# Patient Record
Sex: FEMALE | Race: BLACK OR AFRICAN AMERICAN | Employment: OTHER | URBAN - METROPOLITAN AREA
[De-identification: names, ages, dates, MRNs, and addresses within clinical notes are randomized per-mention and may not be internally consistent; named-entity substitution may affect disease eponyms.]

---

## 2017-01-01 ENCOUNTER — CARE COORDINATION (OUTPATIENT)
Dept: CASE MANAGEMENT | Age: 77
End: 2017-01-01

## 2017-01-01 ENCOUNTER — HOSPITAL ENCOUNTER (OUTPATIENT)
Facility: MEDICAL CENTER | Age: 77
End: 2017-01-01
Payer: MEDICARE

## 2017-01-01 ENCOUNTER — TELEPHONE (OUTPATIENT)
Dept: FAMILY MEDICINE CLINIC | Age: 77
End: 2017-01-01

## 2017-01-01 ENCOUNTER — HOSPITAL ENCOUNTER (OUTPATIENT)
Dept: INFUSION THERAPY | Facility: MEDICAL CENTER | Age: 77
Discharge: HOME | End: 2017-10-20

## 2017-01-01 ENCOUNTER — CARE COORDINATION (OUTPATIENT)
Dept: CARE COORDINATION | Age: 77
End: 2017-01-01

## 2017-01-01 ENCOUNTER — TELEPHONE (OUTPATIENT)
Dept: PHARMACY | Age: 77
End: 2017-01-01

## 2017-01-01 ENCOUNTER — TELEPHONE (OUTPATIENT)
Dept: FAMILY MEDICINE CLINIC | Facility: CLINIC | Age: 77
End: 2017-01-01

## 2017-01-01 ENCOUNTER — HOSPITAL ENCOUNTER (OUTPATIENT)
Dept: INTERVENTIONAL RADIOLOGY/VASCULAR | Age: 77
Discharge: HOME | End: 2017-12-08
Payer: COMMERCIAL

## 2017-01-01 ENCOUNTER — APPOINTMENT (OUTPATIENT)
Dept: ULTRASOUND IMAGING | Age: 77
DRG: 641 | End: 2017-01-01
Attending: FAMILY MEDICINE
Payer: MEDICARE

## 2017-01-01 ENCOUNTER — TELEPHONE (OUTPATIENT)
Dept: INFUSION THERAPY | Facility: MEDICAL CENTER | Age: 77
End: 2017-01-01

## 2017-01-01 ENCOUNTER — HOSPITAL ENCOUNTER (OUTPATIENT)
Age: 77
Setting detail: SPECIMEN
Discharge: HOME | End: 2017-05-04
Payer: MEDICARE

## 2017-01-01 ENCOUNTER — APPOINTMENT (OUTPATIENT)
Dept: ULTRASOUND IMAGING | Age: 77
DRG: 871 | End: 2017-01-01
Payer: COMMERCIAL

## 2017-01-01 ENCOUNTER — OFFICE VISIT (OUTPATIENT)
Dept: FAMILY MEDICINE CLINIC | Age: 77
End: 2017-01-01
Payer: MEDICARE

## 2017-01-01 ENCOUNTER — OFFICE VISIT (OUTPATIENT)
Dept: NEPHROLOGY | Age: 77
End: 2017-01-01
Payer: COMMERCIAL

## 2017-01-01 ENCOUNTER — HOSPITAL ENCOUNTER (OUTPATIENT)
Age: 77
Setting detail: SPECIMEN
Discharge: HOME | End: 2017-03-20
Payer: MEDICARE

## 2017-01-01 ENCOUNTER — ANESTHESIA (OUTPATIENT)
Dept: ENDOSCOPY | Age: 77
DRG: 641 | End: 2017-01-01
Payer: MEDICARE

## 2017-01-01 ENCOUNTER — HOSPITAL ENCOUNTER (OUTPATIENT)
Age: 77
Setting detail: SPECIMEN
Discharge: HOME | End: 2017-10-11
Payer: COMMERCIAL

## 2017-01-01 ENCOUNTER — HOSPITAL ENCOUNTER (OUTPATIENT)
Facility: MEDICAL CENTER | Age: 77
End: 2017-01-01
Payer: COMMERCIAL

## 2017-01-01 ENCOUNTER — TELEPHONE (OUTPATIENT)
Dept: NEPHROLOGY | Age: 77
End: 2017-01-01

## 2017-01-01 ENCOUNTER — HOSPITAL ENCOUNTER (INPATIENT)
Age: 77
LOS: 9 days | Discharge: HOME HEALTH | DRG: 641 | End: 2017-07-29
Attending: FAMILY MEDICINE | Admitting: FAMILY MEDICINE
Payer: MEDICARE

## 2017-01-01 ENCOUNTER — CARE COORDINATOR VISIT (OUTPATIENT)
Dept: CARE COORDINATION | Age: 77
End: 2017-01-01

## 2017-01-01 ENCOUNTER — HOSPITAL ENCOUNTER (OUTPATIENT)
Age: 77
Setting detail: SPECIMEN
Discharge: HOME | End: 2017-05-03
Payer: MEDICARE

## 2017-01-01 ENCOUNTER — EPISODE CHANGES (OUTPATIENT)
Dept: CASE MANAGEMENT | Age: 77
End: 2017-01-01

## 2017-01-01 ENCOUNTER — OFFICE VISIT (OUTPATIENT)
Dept: FAMILY MEDICINE CLINIC | Age: 77
End: 2017-01-01
Payer: COMMERCIAL

## 2017-01-01 ENCOUNTER — HOSPITAL ENCOUNTER (INPATIENT)
Age: 77
LOS: 13 days | Discharge: NURSING, REHAB OR LONG-TERM CARE | DRG: 871 | End: 2018-01-06
Attending: EMERGENCY MEDICINE | Admitting: INTERNAL MEDICINE
Payer: COMMERCIAL

## 2017-01-01 ENCOUNTER — HOSPITAL ENCOUNTER (OUTPATIENT)
Dept: INFUSION THERAPY | Facility: MEDICAL CENTER | Age: 77
Discharge: HOME | End: 2017-09-22
Payer: COMMERCIAL

## 2017-01-01 ENCOUNTER — HOSPITAL ENCOUNTER (OUTPATIENT)
Age: 77
Setting detail: SPECIMEN
Discharge: HOME | End: 2017-03-13
Payer: MEDICARE

## 2017-01-01 ENCOUNTER — APPOINTMENT (OUTPATIENT)
Dept: CT IMAGING | Age: 77
DRG: 641 | End: 2017-01-01
Attending: FAMILY MEDICINE
Payer: MEDICARE

## 2017-01-01 ENCOUNTER — HOSPITAL ENCOUNTER (OUTPATIENT)
Age: 77
Setting detail: SPECIMEN
Discharge: HOME | End: 2017-05-16
Payer: MEDICARE

## 2017-01-01 ENCOUNTER — TELEPHONE (OUTPATIENT)
Dept: ADMINISTRATIVE | Age: 77
End: 2017-01-01

## 2017-01-01 ENCOUNTER — APPOINTMENT (OUTPATIENT)
Dept: GENERAL RADIOLOGY | Age: 77
DRG: 641 | End: 2017-01-01
Attending: FAMILY MEDICINE
Payer: MEDICARE

## 2017-01-01 ENCOUNTER — HOSPITAL ENCOUNTER (OUTPATIENT)
Age: 77
Setting detail: SPECIMEN
Discharge: HOME | End: 2017-05-11
Payer: MEDICARE

## 2017-01-01 ENCOUNTER — APPOINTMENT (OUTPATIENT)
Dept: GENERAL RADIOLOGY | Age: 77
DRG: 871 | End: 2017-01-01
Payer: COMMERCIAL

## 2017-01-01 ENCOUNTER — HOSPITAL ENCOUNTER (OUTPATIENT)
Age: 77
Setting detail: SPECIMEN
Discharge: HOME | End: 2017-03-27
Payer: MEDICARE

## 2017-01-01 ENCOUNTER — HOSPITAL ENCOUNTER (OUTPATIENT)
Age: 77
Setting detail: SPECIMEN
Discharge: HOME | End: 2017-05-31
Payer: MEDICARE

## 2017-01-01 ENCOUNTER — HOSPITAL ENCOUNTER (OUTPATIENT)
Dept: VASCULAR LAB | Age: 77
Discharge: HOME | End: 2017-06-20
Payer: MEDICARE

## 2017-01-01 ENCOUNTER — TELEPHONE (OUTPATIENT)
Dept: GASTROENTEROLOGY | Age: 77
End: 2017-01-01

## 2017-01-01 ENCOUNTER — HOSPITAL ENCOUNTER (OUTPATIENT)
Age: 77
Setting detail: SPECIMEN
Discharge: HOME | End: 2017-04-10
Payer: MEDICARE

## 2017-01-01 ENCOUNTER — HOSPITAL ENCOUNTER (OUTPATIENT)
Age: 77
Setting detail: SPECIMEN
Discharge: HOME | End: 2017-03-28
Payer: MEDICARE

## 2017-01-01 ENCOUNTER — OFFICE VISIT (OUTPATIENT)
Dept: ONCOLOGY | Age: 77
End: 2017-01-01
Payer: MEDICARE

## 2017-01-01 ENCOUNTER — HOSPITAL ENCOUNTER (OUTPATIENT)
Dept: INFUSION THERAPY | Facility: MEDICAL CENTER | Age: 77
Discharge: HOME | End: 2017-08-11
Payer: MEDICARE

## 2017-01-01 ENCOUNTER — HOSPITAL ENCOUNTER (OUTPATIENT)
Facility: MEDICAL CENTER | Age: 77
Discharge: HOME | End: 2017-08-10
Payer: MEDICARE

## 2017-01-01 ENCOUNTER — TELEPHONE (OUTPATIENT)
Dept: ONCOLOGY | Age: 77
End: 2017-01-01

## 2017-01-01 ENCOUNTER — HOSPITAL ENCOUNTER (OUTPATIENT)
Age: 77
Setting detail: SPECIMEN
Discharge: HOME | End: 2017-05-27
Payer: MEDICARE

## 2017-01-01 ENCOUNTER — CARE COORDINATOR VISIT (OUTPATIENT)
Dept: CASE MANAGEMENT | Age: 77
End: 2017-01-01

## 2017-01-01 ENCOUNTER — HOSPITAL ENCOUNTER (OUTPATIENT)
Age: 77
Setting detail: SPECIMEN
Discharge: HOME | End: 2017-07-20
Payer: MEDICARE

## 2017-01-01 ENCOUNTER — HOSPITAL ENCOUNTER (INPATIENT)
Age: 77
LOS: 4 days | Discharge: NURSING, REHAB OR LONG-TERM CARE | DRG: 683 | End: 2017-12-20
Attending: FAMILY MEDICINE | Admitting: INTERNAL MEDICINE
Payer: COMMERCIAL

## 2017-01-01 ENCOUNTER — HOSPITAL ENCOUNTER (OUTPATIENT)
Age: 77
Setting detail: SPECIMEN
Discharge: HOME | End: 2017-05-01
Payer: MEDICARE

## 2017-01-01 ENCOUNTER — ANESTHESIA EVENT (OUTPATIENT)
Dept: ENDOSCOPY | Age: 77
DRG: 641 | End: 2017-01-01
Payer: MEDICARE

## 2017-01-01 ENCOUNTER — HOSPITAL ENCOUNTER (OUTPATIENT)
Dept: INFUSION THERAPY | Facility: MEDICAL CENTER | Age: 77
Discharge: HOME | End: 2017-10-27

## 2017-01-01 ENCOUNTER — HOSPITAL ENCOUNTER (INPATIENT)
Age: 77
LOS: 1 days | Discharge: NURSING, REHAB OR LONG-TERM CARE | DRG: 812 | End: 2017-03-14
Attending: EMERGENCY MEDICINE | Admitting: INTERNAL MEDICINE
Payer: MEDICARE

## 2017-01-01 ENCOUNTER — HOSPITAL ENCOUNTER (OUTPATIENT)
Age: 77
Setting detail: SPECIMEN
Discharge: HOME | End: 2017-04-05
Payer: MEDICARE

## 2017-01-01 ENCOUNTER — TELEPHONE (OUTPATIENT)
Dept: PHARMACY | Facility: CLINIC | Age: 77
End: 2017-01-01

## 2017-01-01 ENCOUNTER — TELEPHONE (OUTPATIENT)
Dept: CASE MANAGEMENT | Age: 77
End: 2017-01-01

## 2017-01-01 VITALS
HEIGHT: 64 IN | DIASTOLIC BLOOD PRESSURE: 84 MMHG | SYSTOLIC BLOOD PRESSURE: 118 MMHG | WEIGHT: 141 LBS | TEMPERATURE: 97.9 F | HEART RATE: 93 BPM | BODY MASS INDEX: 24.07 KG/M2

## 2017-01-01 VITALS
DIASTOLIC BLOOD PRESSURE: 85 MMHG | BODY MASS INDEX: 25.76 KG/M2 | HEART RATE: 89 BPM | RESPIRATION RATE: 20 BRPM | SYSTOLIC BLOOD PRESSURE: 132 MMHG | WEIGHT: 150.9 LBS | TEMPERATURE: 97.8 F

## 2017-01-01 VITALS
RESPIRATION RATE: 16 BRPM | HEIGHT: 67 IN | TEMPERATURE: 97.9 F | OXYGEN SATURATION: 100 % | WEIGHT: 126.2 LBS | HEART RATE: 119 BPM | DIASTOLIC BLOOD PRESSURE: 56 MMHG | SYSTOLIC BLOOD PRESSURE: 97 MMHG | BODY MASS INDEX: 19.81 KG/M2

## 2017-01-01 VITALS
BODY MASS INDEX: 29.91 KG/M2 | HEART RATE: 70 BPM | HEIGHT: 64 IN | TEMPERATURE: 97.8 F | WEIGHT: 175.2 LBS | DIASTOLIC BLOOD PRESSURE: 76 MMHG | SYSTOLIC BLOOD PRESSURE: 114 MMHG

## 2017-01-01 VITALS — DIASTOLIC BLOOD PRESSURE: 74 MMHG | OXYGEN SATURATION: 97 % | SYSTOLIC BLOOD PRESSURE: 140 MMHG

## 2017-01-01 VITALS — SYSTOLIC BLOOD PRESSURE: 128 MMHG | DIASTOLIC BLOOD PRESSURE: 98 MMHG | HEART RATE: 96 BPM | OXYGEN SATURATION: 99 %

## 2017-01-01 VITALS
WEIGHT: 150.2 LBS | SYSTOLIC BLOOD PRESSURE: 136 MMHG | HEIGHT: 64 IN | DIASTOLIC BLOOD PRESSURE: 80 MMHG | TEMPERATURE: 98.3 F | BODY MASS INDEX: 25.64 KG/M2 | HEART RATE: 91 BPM

## 2017-01-01 VITALS
RESPIRATION RATE: 16 BRPM | SYSTOLIC BLOOD PRESSURE: 117 MMHG | OXYGEN SATURATION: 100 % | HEIGHT: 67 IN | TEMPERATURE: 98.5 F | WEIGHT: 189.8 LBS | DIASTOLIC BLOOD PRESSURE: 59 MMHG | BODY MASS INDEX: 29.79 KG/M2 | HEART RATE: 109 BPM

## 2017-01-01 VITALS
SYSTOLIC BLOOD PRESSURE: 110 MMHG | HEIGHT: 64 IN | HEART RATE: 109 BPM | BODY MASS INDEX: 29.88 KG/M2 | TEMPERATURE: 97.5 F | DIASTOLIC BLOOD PRESSURE: 72 MMHG | WEIGHT: 175 LBS

## 2017-01-01 VITALS
WEIGHT: 168.6 LBS | OXYGEN SATURATION: 100 % | TEMPERATURE: 98.6 F | HEIGHT: 64 IN | DIASTOLIC BLOOD PRESSURE: 74 MMHG | SYSTOLIC BLOOD PRESSURE: 137 MMHG | BODY MASS INDEX: 28.79 KG/M2 | HEART RATE: 84 BPM | RESPIRATION RATE: 16 BRPM

## 2017-01-01 VITALS
TEMPERATURE: 97.3 F | DIASTOLIC BLOOD PRESSURE: 76 MMHG | WEIGHT: 125.6 LBS | SYSTOLIC BLOOD PRESSURE: 116 MMHG | BODY MASS INDEX: 21.44 KG/M2 | HEIGHT: 64 IN | HEART RATE: 100 BPM

## 2017-01-01 VITALS
HEART RATE: 92 BPM | OXYGEN SATURATION: 100 % | DIASTOLIC BLOOD PRESSURE: 78 MMHG | TEMPERATURE: 97.7 F | RESPIRATION RATE: 18 BRPM | SYSTOLIC BLOOD PRESSURE: 114 MMHG

## 2017-01-01 DIAGNOSIS — N17.9 ACUTE ON CHRONIC KIDNEY FAILURE (HCC): ICD-10-CM

## 2017-01-01 DIAGNOSIS — R22.43 LOCALIZED SWELLING OF BOTH LOWER LEGS: ICD-10-CM

## 2017-01-01 DIAGNOSIS — E87.20 METABOLIC ACIDOSIS: ICD-10-CM

## 2017-01-01 DIAGNOSIS — Z90.49 HISTORY OF PARTIAL COLECTOMY: ICD-10-CM

## 2017-01-01 DIAGNOSIS — E87.6 HYPOKALEMIA: Primary | ICD-10-CM

## 2017-01-01 DIAGNOSIS — N28.9 ACUTE ON CHRONIC RENAL INSUFFICIENCY: ICD-10-CM

## 2017-01-01 DIAGNOSIS — D63.1 ANEMIA IN CHRONIC RENAL DISEASE: ICD-10-CM

## 2017-01-01 DIAGNOSIS — M54.9 CHRONIC MIDLINE BACK PAIN, UNSPECIFIED BACK LOCATION: ICD-10-CM

## 2017-01-01 DIAGNOSIS — E87.20 METABOLIC ACIDOSIS: Primary | ICD-10-CM

## 2017-01-01 DIAGNOSIS — N18.9 CHRONIC KIDNEY DISEASE, UNSPECIFIED CKD STAGE: ICD-10-CM

## 2017-01-01 DIAGNOSIS — E55.9 VITAMIN D DEFICIENCY: ICD-10-CM

## 2017-01-01 DIAGNOSIS — N18.30 TYPE 2 DIABETES MELLITUS WITH STAGE 3 CHRONIC KIDNEY DISEASE, WITHOUT LONG-TERM CURRENT USE OF INSULIN (HCC): Primary | ICD-10-CM

## 2017-01-01 DIAGNOSIS — E83.42 HYPOMAGNESEMIA: ICD-10-CM

## 2017-01-01 DIAGNOSIS — D63.8 ANEMIA OF CHRONIC DISEASE: ICD-10-CM

## 2017-01-01 DIAGNOSIS — D50.0 IRON DEFICIENCY ANEMIA DUE TO CHRONIC BLOOD LOSS: ICD-10-CM

## 2017-01-01 DIAGNOSIS — N18.9 ACUTE ON CHRONIC RENAL INSUFFICIENCY: ICD-10-CM

## 2017-01-01 DIAGNOSIS — R77.8 ELEVATED TROPONIN: ICD-10-CM

## 2017-01-01 DIAGNOSIS — N18.4 CHRONIC KIDNEY DISEASE (CKD), STAGE IV (SEVERE) (HCC): ICD-10-CM

## 2017-01-01 DIAGNOSIS — Z91.81 AT HIGH RISK FOR FALLS: ICD-10-CM

## 2017-01-01 DIAGNOSIS — N18.4 CHRONIC KIDNEY DISEASE (CKD), STAGE IV (SEVERE) (HCC): Primary | ICD-10-CM

## 2017-01-01 DIAGNOSIS — N17.9 AKI (ACUTE KIDNEY INJURY) (HCC): ICD-10-CM

## 2017-01-01 DIAGNOSIS — R53.81 DEBILITATED PATIENT: Primary | ICD-10-CM

## 2017-01-01 DIAGNOSIS — E46 MALNUTRITION (HCC): ICD-10-CM

## 2017-01-01 DIAGNOSIS — N18.9 ANEMIA IN CHRONIC RENAL DISEASE: ICD-10-CM

## 2017-01-01 DIAGNOSIS — R63.4 WEIGHT LOSS: ICD-10-CM

## 2017-01-01 DIAGNOSIS — E11.22 TYPE 2 DIABETES MELLITUS WITH STAGE 3 CHRONIC KIDNEY DISEASE, WITHOUT LONG-TERM CURRENT USE OF INSULIN (HCC): ICD-10-CM

## 2017-01-01 DIAGNOSIS — E87.6 HYPOKALEMIA: ICD-10-CM

## 2017-01-01 DIAGNOSIS — G89.29 CHRONIC MIDLINE BACK PAIN, UNSPECIFIED BACK LOCATION: ICD-10-CM

## 2017-01-01 DIAGNOSIS — N39.0 URINARY TRACT INFECTION IN FEMALE: Primary | ICD-10-CM

## 2017-01-01 DIAGNOSIS — N18.30 CKD (CHRONIC KIDNEY DISEASE) STAGE 3, GFR 30-59 ML/MIN (HCC): Primary | ICD-10-CM

## 2017-01-01 DIAGNOSIS — I95.9 HYPOTENSION, UNSPECIFIED HYPOTENSION TYPE: ICD-10-CM

## 2017-01-01 DIAGNOSIS — R63.0 POOR APPETITE: ICD-10-CM

## 2017-01-01 DIAGNOSIS — Z87.898 HISTORY OF DIARRHEA: ICD-10-CM

## 2017-01-01 DIAGNOSIS — M54.50 CHRONIC MIDLINE LOW BACK PAIN WITHOUT SCIATICA: ICD-10-CM

## 2017-01-01 DIAGNOSIS — M25.551 RIGHT HIP PAIN: Primary | ICD-10-CM

## 2017-01-01 DIAGNOSIS — E16.2 HYPOGLYCEMIA: ICD-10-CM

## 2017-01-01 DIAGNOSIS — G89.29 CHRONIC MIDLINE LOW BACK PAIN WITHOUT SCIATICA: ICD-10-CM

## 2017-01-01 DIAGNOSIS — N17.9 ACUTE KIDNEY INJURY (HCC): Primary | ICD-10-CM

## 2017-01-01 DIAGNOSIS — E46 MALNUTRITION (HCC): Primary | ICD-10-CM

## 2017-01-01 DIAGNOSIS — E11.22 TYPE 2 DIABETES MELLITUS WITH STAGE 3 CHRONIC KIDNEY DISEASE, WITHOUT LONG-TERM CURRENT USE OF INSULIN (HCC): Primary | ICD-10-CM

## 2017-01-01 DIAGNOSIS — I10 ESSENTIAL HYPERTENSION: ICD-10-CM

## 2017-01-01 DIAGNOSIS — N18.30 TYPE 2 DIABETES MELLITUS WITH STAGE 3 CHRONIC KIDNEY DISEASE, WITHOUT LONG-TERM CURRENT USE OF INSULIN (HCC): ICD-10-CM

## 2017-01-01 DIAGNOSIS — N18.9 ACUTE ON CHRONIC KIDNEY FAILURE (HCC): ICD-10-CM

## 2017-01-01 DIAGNOSIS — R63.4 WEIGHT LOSS: Primary | ICD-10-CM

## 2017-01-01 DIAGNOSIS — E87.5 HYPERKALEMIA: ICD-10-CM

## 2017-01-01 DIAGNOSIS — A41.9 SEPSIS, DUE TO UNSPECIFIED ORGANISM: ICD-10-CM

## 2017-01-01 LAB
-: ABNORMAL
-: ABNORMAL
ABO/RH: NORMAL
ABO: NORMAL
ABSOLUTE EOS #: 0 K/UL (ref 0–0.4)
ABSOLUTE EOS #: 0.1 K/UL (ref 0–0.4)
ABSOLUTE EOS #: 0.1 K/UL (ref 0–0.4)
ABSOLUTE EOS #: 0.12 K/UL (ref 0–0.4)
ABSOLUTE EOS #: 0.4 K/UL (ref 0–0.4)
ABSOLUTE LYMPH #: 0.32 K/UL (ref 1–4.8)
ABSOLUTE LYMPH #: 1.1 K/UL (ref 1–4.8)
ABSOLUTE LYMPH #: 1.4 K/UL (ref 1–4.8)
ABSOLUTE LYMPH #: 1.63 K/UL (ref 1–4.8)
ABSOLUTE LYMPH #: 2.36 K/UL (ref 1–4.8)
ABSOLUTE MONO #: 0.25 K/UL (ref 0.1–0.8)
ABSOLUTE MONO #: 0.3 K/UL (ref 0.2–0.8)
ABSOLUTE MONO #: 0.38 K/UL (ref 0.1–0.8)
ABSOLUTE MONO #: 0.5 K/UL (ref 0.1–1.2)
ABSOLUTE MONO #: 0.53 K/UL (ref 0.1–0.8)
ABSOLUTE RETIC #: 0.01 M/UL (ref 0.02–0.1)
ABSOLUTE RETIC #: 0.06 M/UL (ref 0.02–0.1)
ACETAMINOPHEN LEVEL: <10 UG/ML (ref 10–30)
ADENOVIRUS F 40 41 PCR: NOT DETECTED
AFP-TUMOR MARKER: 1.8 UG/L
ALBUMIN (CALCULATED): 3.4 G/DL (ref 3.2–5.2)
ALBUMIN PERCENT: 63 % (ref 45–65)
ALBUMIN SERPL-MCNC: 1.9 G/DL (ref 3.5–5.1)
ALBUMIN SERPL-MCNC: 1.9 G/DL (ref 3.5–5.1)
ALBUMIN SERPL-MCNC: 2 G/DL (ref 3.5–5.1)
ALBUMIN SERPL-MCNC: 2.1 G/DL (ref 3.5–5.1)
ALBUMIN SERPL-MCNC: 2.3 G/DL (ref 3.5–5.1)
ALBUMIN SERPL-MCNC: 2.4 G/DL (ref 3.5–5.2)
ALBUMIN SERPL-MCNC: 2.5 G/DL (ref 3.5–5.1)
ALBUMIN SERPL-MCNC: 2.5 G/DL (ref 3.5–5.2)
ALBUMIN SERPL-MCNC: 3.2 G/DL (ref 3.5–5.2)
ALBUMIN SERPL-MCNC: 3.8 G/DL (ref 3.5–5.2)
ALBUMIN/GLOBULIN RATIO: 1.2 (ref 1–2.5)
ALBUMIN/GLOBULIN RATIO: ABNORMAL (ref 1–2.5)
ALLEN TEST: ABNORMAL
ALLEN TEST: ABNORMAL
ALLEN TEST: POSITIVE
ALP BLD-CCNC: 110 U/L (ref 35–104)
ALP BLD-CCNC: 127 U/L (ref 35–104)
ALP BLD-CCNC: 169 U/L (ref 38–126)
ALP BLD-CCNC: 170 U/L (ref 38–126)
ALP BLD-CCNC: 171 U/L (ref 38–126)
ALP BLD-CCNC: 186 U/L (ref 38–126)
ALP BLD-CCNC: 197 U/L (ref 38–126)
ALP BLD-CCNC: 219 U/L (ref 38–126)
ALPHA 1 PERCENT: 3 % (ref 3–6)
ALPHA 2 PERCENT: 11 % (ref 6–13)
ALPHA-1-GLOBULIN: 0.2 G/DL (ref 0.1–0.4)
ALPHA-2-GLOBULIN: 0.6 G/DL (ref 0.5–0.9)
ALT SERPL-CCNC: 14 U/L (ref 11–66)
ALT SERPL-CCNC: 16 U/L (ref 11–66)
ALT SERPL-CCNC: 20 U/L (ref 11–66)
ALT SERPL-CCNC: 29 U/L (ref 11–66)
ALT SERPL-CCNC: 8 U/L (ref 5–33)
ALT SERPL-CCNC: 9 U/L (ref 5–33)
AMMONIA: 25 UMOL/L (ref 11–60)
AMMONIA: 77 UMOL/L (ref 11–51)
AMORPHOUS: ABNORMAL
AMORPHOUS: ABNORMAL
AMYLASE: 108 U/L (ref 28–100)
ANION GAP SERPL CALCULATED.3IONS-SCNC: 10 MEQ/L (ref 8–16)
ANION GAP SERPL CALCULATED.3IONS-SCNC: 11 MEQ/L (ref 8–16)
ANION GAP SERPL CALCULATED.3IONS-SCNC: 12 MEQ/L (ref 8–16)
ANION GAP SERPL CALCULATED.3IONS-SCNC: 13 MEQ/L (ref 8–16)
ANION GAP SERPL CALCULATED.3IONS-SCNC: 13 MMOL/L (ref 9–17)
ANION GAP SERPL CALCULATED.3IONS-SCNC: 13 MMOL/L (ref 9–17)
ANION GAP SERPL CALCULATED.3IONS-SCNC: 14 MEQ/L (ref 8–16)
ANION GAP SERPL CALCULATED.3IONS-SCNC: 14 MMOL/L (ref 9–17)
ANION GAP SERPL CALCULATED.3IONS-SCNC: 15 MEQ/L (ref 8–16)
ANION GAP SERPL CALCULATED.3IONS-SCNC: 15 MEQ/L (ref 8–16)
ANION GAP SERPL CALCULATED.3IONS-SCNC: 15 MMOL/L (ref 9–17)
ANION GAP SERPL CALCULATED.3IONS-SCNC: 15 MMOL/L (ref 9–17)
ANION GAP SERPL CALCULATED.3IONS-SCNC: 16 MEQ/L (ref 8–16)
ANION GAP SERPL CALCULATED.3IONS-SCNC: 16 MEQ/L (ref 8–16)
ANION GAP SERPL CALCULATED.3IONS-SCNC: 16 MMOL/L (ref 9–17)
ANION GAP SERPL CALCULATED.3IONS-SCNC: 16 MMOL/L (ref 9–17)
ANION GAP SERPL CALCULATED.3IONS-SCNC: 17 MEQ/L (ref 8–16)
ANION GAP SERPL CALCULATED.3IONS-SCNC: 17 MMOL/L (ref 9–17)
ANION GAP SERPL CALCULATED.3IONS-SCNC: 18 MMOL/L (ref 9–17)
ANION GAP SERPL CALCULATED.3IONS-SCNC: 18 MMOL/L (ref 9–17)
ANION GAP SERPL CALCULATED.3IONS-SCNC: 19 MMOL/L (ref 9–17)
ANION GAP SERPL CALCULATED.3IONS-SCNC: 21 MMOL/L (ref 9–17)
ANION GAP SERPL CALCULATED.3IONS-SCNC: 22 MMOL/L (ref 9–17)
ANION GAP SERPL CALCULATED.3IONS-SCNC: 22 MMOL/L (ref 9–17)
ANION GAP SERPL CALCULATED.3IONS-SCNC: 23 MMOL/L (ref 9–17)
ANION GAP SERPL CALCULATED.3IONS-SCNC: 31 MMOL/L (ref 9–17)
ANION GAP SERPL CALCULATED.3IONS-SCNC: 31 MMOL/L (ref 9–17)
ANISOCYTOSIS: ABNORMAL
ANTI-NUCLEAR ANTIBODY (ANA): NEGATIVE
ANTIBODY SCREEN: NEGATIVE
ANTIBODY SCREEN: NORMAL
APTT: 40.5 SECONDS (ref 22–38)
APTT: 46.2 SECONDS (ref 22–38)
ARM BAND NUMBER: NORMAL
AST SERPL-CCNC: 11 U/L
AST SERPL-CCNC: 14 U/L (ref 5–40)
AST SERPL-CCNC: 14 U/L (ref 5–40)
AST SERPL-CCNC: 15 U/L
AST SERPL-CCNC: 17 U/L (ref 5–40)
AST SERPL-CCNC: 18 U/L (ref 5–40)
AST SERPL-CCNC: 18 U/L (ref 5–40)
AST SERPL-CCNC: 28 U/L (ref 5–40)
ASTROVIRUS PCR: NOT DETECTED
BACTERIA: ABNORMAL
BACTERIA: ABNORMAL
BACTERIA: ABNORMAL /HPF
BACTERIA: ABNORMAL /HPF
BANDED NEUTROPHILS ABSOLUTE COUNT: 0.5 THOU/MM3
BANDED NEUTROPHILS ABSOLUTE COUNT: 0.9 THOU/MM3
BANDED NEUTROPHILS ABSOLUTE COUNT: 1 THOU/MM3
BANDED NEUTROPHILS ABSOLUTE COUNT: 2.6 THOU/MM3
BANDS PRESENT: 13 %
BANDS PRESENT: 3 %
BANDS PRESENT: 5 %
BANDS PRESENT: 5 %
BASE EXCESS (CALCULATED): -10.3 MMOL/L (ref -2.5–2.5)
BASE EXCESS (CALCULATED): -12.5 MMOL/L (ref -2.5–2.5)
BASE EXCESS (CALCULATED): -16.9 MMOL/L (ref -2.5–2.5)
BASOPHILIA: SLIGHT
BASOPHILS # BLD: 0 %
BASOPHILS # BLD: 0 % (ref 0–2)
BASOPHILS # BLD: 0.4 %
BASOPHILS # BLD: 0.4 %
BASOPHILS # BLD: 0.6 %
BASOPHILS # BLD: 0.8 %
BASOPHILS # BLD: 0.8 %
BASOPHILS # BLD: 1 %
BASOPHILS # BLD: 1.1 %
BASOPHILS ABSOLUTE: 0 K/UL (ref 0–0.2)
BASOPHILS ABSOLUTE: 0 K/UL (ref 0–0.2)
BASOPHILS ABSOLUTE: 0 THOU/MM3 (ref 0–0.1)
BASOPHILS ABSOLUTE: 0.1 K/UL (ref 0–0.2)
BASOPHILS ABSOLUTE: 0.1 K/UL (ref 0–0.2)
BASOPHILS ABSOLUTE: 0.1 THOU/MM3 (ref 0–0.1)
BASOPHILS ABSOLUTE: 0.11 K/UL (ref 0–0.2)
BASOPHILS ABSOLUTE: ABNORMAL /ΜL
BASOPHILS ABSOLUTE: ABNORMAL /ΜL
BASOPHILS RELATIVE PERCENT: ABNORMAL %
BASOPHILS RELATIVE PERCENT: ABNORMAL %
BETA GLOBULIN: 0.6 G/DL (ref 0.5–1.1)
BETA PERCENT: 10 % (ref 11–19)
BETA-HYDROXYBUTYRATE: 0.72 MG/DL (ref 0.2–2.81)
BETA-HYDROXYBUTYRATE: 5.08 MMOL/L (ref 0.02–0.27)
BILIRUB SERPL-MCNC: 0.2 MG/DL (ref 0.3–1.2)
BILIRUB SERPL-MCNC: 0.28 MG/DL (ref 0.3–1.2)
BILIRUB SERPL-MCNC: 0.3 MG/DL (ref 0.3–1.2)
BILIRUB SERPL-MCNC: 0.42 MG/DL (ref 0.3–1.2)
BILIRUBIN DIRECT: <0.08 MG/DL
BILIRUBIN URINE: ABNORMAL
BILIRUBIN URINE: NEGATIVE
BILIRUBIN, INDIRECT: ABNORMAL MG/DL (ref 0–1)
BLD PROD TYP BPU: NORMAL
BLD PROD TYP BPU: NORMAL
BLOOD BANK SPECIMEN: NORMAL
BLOOD CULTURE, ROUTINE: ABNORMAL
BLOOD, URINE: ABNORMAL
BLOOD, URINE: NEGATIVE
BUN BLDV-MCNC: 10 MG/DL (ref 8–23)
BUN BLDV-MCNC: 10 MG/DL (ref 8–23)
BUN BLDV-MCNC: 11 MG/DL (ref 7–22)
BUN BLDV-MCNC: 11 MG/DL (ref 7–22)
BUN BLDV-MCNC: 11 MG/DL (ref 8–23)
BUN BLDV-MCNC: 13 MG/DL (ref 8–23)
BUN BLDV-MCNC: 14 MG/DL
BUN BLDV-MCNC: 14 MG/DL (ref 8–23)
BUN BLDV-MCNC: 14 MG/DL (ref 8–23)
BUN BLDV-MCNC: 15 MG/DL (ref 7–22)
BUN BLDV-MCNC: 15 MG/DL (ref 8–23)
BUN BLDV-MCNC: 16 MG/DL (ref 8–23)
BUN BLDV-MCNC: 16 MG/DL (ref 8–23)
BUN BLDV-MCNC: 17 MG/DL (ref 7–22)
BUN BLDV-MCNC: 17 MG/DL (ref 8–23)
BUN BLDV-MCNC: 18 MG/DL (ref 8–23)
BUN BLDV-MCNC: 19 MG/DL (ref 8–23)
BUN BLDV-MCNC: 21 MG/DL (ref 7–22)
BUN BLDV-MCNC: 23 MG/DL
BUN BLDV-MCNC: 24 MG/DL (ref 7–22)
BUN BLDV-MCNC: 24 MG/DL (ref 8–23)
BUN BLDV-MCNC: 27 MG/DL (ref 7–22)
BUN BLDV-MCNC: 28 MG/DL (ref 7–22)
BUN BLDV-MCNC: 28 MG/DL (ref 7–22)
BUN BLDV-MCNC: 29 MG/DL (ref 7–22)
BUN BLDV-MCNC: 31 MG/DL
BUN BLDV-MCNC: 32 MG/DL (ref 7–22)
BUN BLDV-MCNC: 35 MG/DL (ref 7–22)
BUN BLDV-MCNC: 8 MG/DL (ref 7–22)
BUN BLDV-MCNC: 8 MG/DL (ref 8–23)
BUN BLDV-MCNC: 9 MG/DL (ref 7–22)
BUN/CREAT BLD: 10 (ref 9–20)
BUN/CREAT BLD: 12 (ref 9–20)
BUN/CREAT BLD: 6 (ref 9–20)
BUN/CREAT BLD: 8 (ref 9–20)
BUN/CREAT BLD: 8 (ref 9–20)
BUN/CREAT BLD: 9 (ref 9–20)
BUN/CREAT BLD: ABNORMAL (ref 9–20)
C DIFFICILE TOXINS, PCR: NORMAL
C-REACTIVE PROTEIN: 1 MG/L (ref 0–5)
CA 125: 14 U/ML
CALCIUM IONIZED: 1.08 MMOL/L (ref 1.13–1.33)
CALCIUM IONIZED: 1.15 MMOL/L (ref 1.13–1.33)
CALCIUM IONIZED: 1.17 MMOL/L (ref 1.13–1.33)
CALCIUM IONIZED: 1.23 MMOL/L (ref 1.13–1.33)
CALCIUM IONIZED: 1.28 MMOL/L (ref 1.13–1.33)
CALCIUM IONIZED: 1.31 MMOL/L (ref 1.13–1.33)
CALCIUM SERPL-MCNC: 6.4 MG/DL (ref 8.5–10.5)
CALCIUM SERPL-MCNC: 7.4 MG/DL (ref 8.5–10.5)
CALCIUM SERPL-MCNC: 7.5 MG/DL (ref 8.5–10.5)
CALCIUM SERPL-MCNC: 7.6 MG/DL (ref 8.5–10.5)
CALCIUM SERPL-MCNC: 7.6 MG/DL (ref 8.5–10.5)
CALCIUM SERPL-MCNC: 7.8 MG/DL (ref 8.5–10.5)
CALCIUM SERPL-MCNC: 7.8 MG/DL (ref 8.5–10.5)
CALCIUM SERPL-MCNC: 7.8 MG/DL (ref 8.6–10.4)
CALCIUM SERPL-MCNC: 7.9 MG/DL (ref 8.5–10.5)
CALCIUM SERPL-MCNC: 8 MG/DL (ref 8.5–10.5)
CALCIUM SERPL-MCNC: 8.2 MG/DL
CALCIUM SERPL-MCNC: 8.2 MG/DL (ref 8.5–10.5)
CALCIUM SERPL-MCNC: 8.3 MG/DL
CALCIUM SERPL-MCNC: 8.3 MG/DL (ref 8.6–10.4)
CALCIUM SERPL-MCNC: 8.3 MG/DL (ref 8.6–10.4)
CALCIUM SERPL-MCNC: 8.4 MG/DL (ref 8.6–10.4)
CALCIUM SERPL-MCNC: 8.5 MG/DL (ref 8.5–10.5)
CALCIUM SERPL-MCNC: 8.5 MG/DL (ref 8.5–10.5)
CALCIUM SERPL-MCNC: 8.5 MG/DL (ref 8.6–10.4)
CALCIUM SERPL-MCNC: 8.6 MG/DL
CALCIUM SERPL-MCNC: 8.6 MG/DL (ref 8.6–10.4)
CALCIUM SERPL-MCNC: 8.7 MG/DL (ref 8.6–10.4)
CALCIUM SERPL-MCNC: 8.8 MG/DL (ref 8.5–10.5)
CALCIUM SERPL-MCNC: 8.8 MG/DL (ref 8.5–10.5)
CALCIUM SERPL-MCNC: 8.8 MG/DL (ref 8.6–10.4)
CALCIUM SERPL-MCNC: 8.8 MG/DL (ref 8.6–10.4)
CALCIUM SERPL-MCNC: 9 MG/DL (ref 8.6–10.4)
CALCIUM SERPL-MCNC: 9.2 MG/DL (ref 8.6–10.4)
CALCIUM SERPL-MCNC: 9.3 MG/DL (ref 8.6–10.4)
CALCIUM SERPL-MCNC: 9.3 MG/DL (ref 8.6–10.4)
CALCIUM SERPL-MCNC: 9.4 MG/DL (ref 8.6–10.4)
CALCIUM SERPL-MCNC: 9.4 MG/DL (ref 8.6–10.4)
CALCIUM SERPL-MCNC: 9.5 MG/DL (ref 8.6–10.4)
CALCIUM SERPL-MCNC: 9.9 MG/DL (ref 8.6–10.4)
CALPROTECTIN, FECAL: <16 UG/G
CAMPYLOBACTER PCR: NORMAL
CAMPYLOBACTER PCR: NOT DETECTED
CARBOXYHEMOGLOBIN: 1.5 % (ref 0–5)
CASTS 2: ABNORMAL /LPF
CASTS 2: ABNORMAL /LPF
CASTS UA: ABNORMAL /LPF
CASTS UA: ABNORMAL /LPF
CASTS UA: ABNORMAL /LPF (ref 0–2)
CASTS UA: ABNORMAL /LPF (ref 0–2)
CEA: 8.2 NG/ML (ref 0–5)
CHARACTER, URINE: ABNORMAL
CHARACTER, URINE: ABNORMAL
CHLORIDE BLD-SCNC: 100 MMOL/L (ref 98–107)
CHLORIDE BLD-SCNC: 100 MMOL/L (ref 98–107)
CHLORIDE BLD-SCNC: 101 MMOL/L (ref 98–107)
CHLORIDE BLD-SCNC: 101 MMOL/L (ref 98–107)
CHLORIDE BLD-SCNC: 104 MEQ/L (ref 98–111)
CHLORIDE BLD-SCNC: 104 MEQ/L (ref 98–111)
CHLORIDE BLD-SCNC: 104 MMOL/L (ref 98–107)
CHLORIDE BLD-SCNC: 105 MMOL/L (ref 98–107)
CHLORIDE BLD-SCNC: 106 MEQ/L (ref 98–111)
CHLORIDE BLD-SCNC: 106 MMOL/L (ref 98–107)
CHLORIDE BLD-SCNC: 107 MEQ/L (ref 98–111)
CHLORIDE BLD-SCNC: 107 MMOL/L (ref 98–107)
CHLORIDE BLD-SCNC: 108 MMOL/L (ref 98–107)
CHLORIDE BLD-SCNC: 109 MEQ/L (ref 98–111)
CHLORIDE BLD-SCNC: 109 MEQ/L (ref 98–111)
CHLORIDE BLD-SCNC: 109 MMOL/L (ref 98–107)
CHLORIDE BLD-SCNC: 110 MEQ/L (ref 98–111)
CHLORIDE BLD-SCNC: 110 MMOL/L (ref 98–107)
CHLORIDE BLD-SCNC: 111 MEQ/L (ref 98–111)
CHLORIDE BLD-SCNC: 112 MEQ/L (ref 98–111)
CHLORIDE BLD-SCNC: 112 MMOL/L (ref 98–107)
CHLORIDE BLD-SCNC: 113 MEQ/L (ref 98–111)
CHLORIDE BLD-SCNC: 113 MMOL/L
CHLORIDE BLD-SCNC: 113 MMOL/L
CHLORIDE BLD-SCNC: 113 MMOL/L (ref 98–107)
CHLORIDE BLD-SCNC: 114 MEQ/L (ref 98–111)
CHLORIDE BLD-SCNC: 114 MEQ/L (ref 98–111)
CHLORIDE BLD-SCNC: 114 MMOL/L
CHLORIDE BLD-SCNC: 116 MMOL/L (ref 98–107)
CHLORIDE BLD-SCNC: 97 MMOL/L (ref 98–107)
CHLORIDE, URINE: 29 MEQ/L
CHOLESTEROL/HDL RATIO: 1.5
CHOLESTEROL: 77 MG/DL
CHP ED QC CHECK: 58
CLOSTRIDIUM DIFFICILE, PCR: NEGATIVE
CLOSTRIDIUM DIFFICILE, PCR: NOT DETECTED
CO2: 10 MMOL/L (ref 20–31)
CO2: 11 MMOL/L (ref 20–31)
CO2: 12 MEQ/L (ref 23–33)
CO2: 12 MMOL/L (ref 20–31)
CO2: 13 MMOL/L (ref 20–31)
CO2: 13 MMOL/L (ref 20–31)
CO2: 14 MEQ/L (ref 23–33)
CO2: 14 MEQ/L (ref 23–33)
CO2: 14 MMOL/L
CO2: 14 MMOL/L (ref 20–31)
CO2: 15 MEQ/L (ref 23–33)
CO2: 16 MMOL/L (ref 20–31)
CO2: 16 MMOL/L (ref 20–31)
CO2: 17 MEQ/L (ref 23–33)
CO2: 17 MMOL/L
CO2: 17 MMOL/L (ref 20–31)
CO2: 17 MMOL/L (ref 20–31)
CO2: 18 MEQ/L (ref 23–33)
CO2: 18 MEQ/L (ref 23–33)
CO2: 18 MMOL/L
CO2: 18 MMOL/L (ref 20–31)
CO2: 18 MMOL/L (ref 20–31)
CO2: 19 MEQ/L (ref 23–33)
CO2: 19 MEQ/L (ref 23–33)
CO2: 19 MMOL/L (ref 20–31)
CO2: 21 MEQ/L (ref 23–33)
CO2: 21 MEQ/L (ref 23–33)
CO2: 22 MMOL/L (ref 20–31)
CO2: 24 MEQ/L (ref 23–33)
CO2: 24 MEQ/L (ref 23–33)
CO2: 25 MMOL/L (ref 20–31)
CO2: 26 MMOL/L (ref 20–31)
CO2: 27 MMOL/L (ref 20–31)
CO2: 7 MMOL/L (ref 20–31)
CO2: 8 MMOL/L (ref 20–31)
CO2: 9 MEQ/L (ref 23–33)
COLLECTED BY:: ABNORMAL
COLOR: ABNORMAL
COLOR: YELLOW
COMMENT UA: ABNORMAL
COMMENT UA: ABNORMAL
COMPLEMENT C3: 75 MG/DL (ref 90–180)
COMPLEMENT C4: 29 MG/DL (ref 10–40)
CORTISOL COLLECTION INFO: NORMAL
CORTISOL: 20.8 UG/DL
CREAT SERPL-MCNC: 1.3 MG/DL (ref 0.4–1.2)
CREAT SERPL-MCNC: 1.4 MG/DL (ref 0.4–1.2)
CREAT SERPL-MCNC: 1.44 MG/DL (ref 0.5–0.9)
CREAT SERPL-MCNC: 1.5 MG/DL
CREAT SERPL-MCNC: 1.5 MG/DL (ref 0.4–1.2)
CREAT SERPL-MCNC: 1.5 MG/DL (ref 0.4–1.2)
CREAT SERPL-MCNC: 1.6 MG/DL
CREAT SERPL-MCNC: 1.74 MG/DL (ref 0.5–0.9)
CREAT SERPL-MCNC: 1.78 MG/DL (ref 0.5–0.9)
CREAT SERPL-MCNC: 1.8 MG/DL (ref 0.5–0.9)
CREAT SERPL-MCNC: 1.86 MG/DL (ref 0.5–0.9)
CREAT SERPL-MCNC: 1.88 MG/DL (ref 0.5–0.9)
CREAT SERPL-MCNC: 1.91 MG/DL (ref 0.5–0.9)
CREAT SERPL-MCNC: 1.92 MG/DL (ref 0.5–0.9)
CREAT SERPL-MCNC: 1.92 MG/DL (ref 0.5–0.9)
CREAT SERPL-MCNC: 1.93 MG/DL (ref 0.5–0.9)
CREAT SERPL-MCNC: 1.95 MG/DL (ref 0.5–0.9)
CREAT SERPL-MCNC: 1.97 MG/DL (ref 0.5–0.9)
CREAT SERPL-MCNC: 2 MG/DL (ref 0.4–1.2)
CREAT SERPL-MCNC: 2 MG/DL (ref 0.5–0.9)
CREAT SERPL-MCNC: 2.01 MG/DL (ref 0.5–0.9)
CREAT SERPL-MCNC: 2.01 MG/DL (ref 0.5–0.9)
CREAT SERPL-MCNC: 2.02 MG/DL
CREAT SERPL-MCNC: 2.08 MG/DL (ref 0.5–0.9)
CREAT SERPL-MCNC: 2.08 MG/DL (ref 0.5–0.9)
CREAT SERPL-MCNC: 2.4 MG/DL (ref 0.4–1.2)
CREAT SERPL-MCNC: 2.42 MG/DL (ref 0.5–0.9)
CREAT SERPL-MCNC: 2.7 MG/DL (ref 0.4–1.2)
CREAT SERPL-MCNC: 2.75 MG/DL (ref 0.5–0.9)
CREAT SERPL-MCNC: 2.9 MG/DL (ref 0.4–1.2)
CREAT SERPL-MCNC: 3 MG/DL (ref 0.4–1.2)
CREAT SERPL-MCNC: 3.18 MG/DL (ref 0.5–0.9)
CREAT SERPL-MCNC: 3.2 MG/DL (ref 0.4–1.2)
CREAT SERPL-MCNC: 3.32 MG/DL (ref 0.5–0.9)
CREAT SERPL-MCNC: 3.5 MG/DL (ref 0.4–1.2)
CREAT SERPL-MCNC: 3.8 MG/DL (ref 0.4–1.2)
CREATININE URINE: 120.1 MG/DL (ref 28–217)
CRENATED RBC'S: ABNORMAL
CROSSMATCH RESULT: NORMAL
CROSSMATCH RESULT: NORMAL
CRYPTOSPORIDIUM PCR: NOT DETECTED
CRYSTALS, UA: ABNORMAL
CRYSTALS, UA: ABNORMAL
CRYSTALS, UA: ABNORMAL /HPF
CRYSTALS, UA: ABNORMAL /HPF
CULTURE: ABNORMAL
CULTURE: NORMAL
CYCLOSPORA CAYETANENSIS PCR: NOT DETECTED
DATE, STOOL #1: 5
DATE, STOOL #1: NORMAL
DATE, STOOL #1: NORMAL
DATE, STOOL #2: NORMAL
DATE, STOOL #3: NORMAL
DEVICE: ABNORMAL
DIFFERENTIAL TYPE: ABNORMAL
DIFFERENTIAL, MANUAL: NORMAL
DIRECT EXAM: NORMAL
DIRECT EXAM: NORMAL
DISPENSE STATUS BLOOD BANK: NORMAL
DISPENSE STATUS BLOOD BANK: NORMAL
E COLI 0157 PCR: NORMAL
E COLI ENTEROAGGREGATIVE PCR: NOT DETECTED
E COLI ENTEROPATHOGENIC PCR: NOT DETECTED
E COLI ENTEROTOXIGENIC PCR: NOT DETECTED
E COLI SHIGA LIKE TOXIN PCR: NOT DETECTED
E COLI SHIGELLA/ENTEROINVASIVE PCR: NOT DETECTED
E HISTOLYTICA GI FILM ARRAY: NOT DETECTED
EKG ATRIAL RATE: 115 BPM
EKG ATRIAL RATE: 79 BPM
EKG ATRIAL RATE: 79 BPM
EKG ATRIAL RATE: 91 BPM
EKG P AXIS: 36 DEGREES
EKG P AXIS: 42 DEGREES
EKG P AXIS: 46 DEGREES
EKG P AXIS: 73 DEGREES
EKG P-R INTERVAL: 122 MS
EKG P-R INTERVAL: 130 MS
EKG P-R INTERVAL: 130 MS
EKG P-R INTERVAL: 160 MS
EKG Q-T INTERVAL: 338 MS
EKG Q-T INTERVAL: 392 MS
EKG Q-T INTERVAL: 392 MS
EKG Q-T INTERVAL: 402 MS
EKG QRS DURATION: 66 MS
EKG QRS DURATION: 68 MS
EKG QRS DURATION: 76 MS
EKG QRS DURATION: 82 MS
EKG QTC CALCULATION (BAZETT): 449 MS
EKG QTC CALCULATION (BAZETT): 449 MS
EKG QTC CALCULATION (BAZETT): 467 MS
EKG QTC CALCULATION (BAZETT): 494 MS
EKG R AXIS: 13 DEGREES
EKG R AXIS: 51 DEGREES
EKG R AXIS: 6 DEGREES
EKG R AXIS: 8 DEGREES
EKG T AXIS: 155 DEGREES
EKG T AXIS: 163 DEGREES
EKG T AXIS: 169 DEGREES
EKG T AXIS: 71 DEGREES
EKG VENTRICULAR RATE: 115 BPM
EKG VENTRICULAR RATE: 79 BPM
EKG VENTRICULAR RATE: 79 BPM
EKG VENTRICULAR RATE: 91 BPM
EOSINOPHIL # BLD: 20 %
EOSINOPHIL # BLD: 22 %
EOSINOPHIL # BLD: 25 %
EOSINOPHIL # BLD: 3.3 %
EOSINOPHIL # BLD: 36 %
EOSINOPHIL # BLD: 5 %
EOSINOPHIL # BLD: 6.9 %
EOSINOPHIL # BLD: 7.3 %
EOSINOPHIL # BLD: 7.4 %
EOSINOPHIL # BLD: 7.7 %
EOSINOPHIL # BLD: 8.2 %
EOSINOPHIL # BLD: 9.2 %
EOSINOPHILS ABSOLUTE: 0.4 THOU/MM3 (ref 0–0.4)
EOSINOPHILS ABSOLUTE: 0.5 THOU/MM3 (ref 0–0.4)
EOSINOPHILS ABSOLUTE: 0.5 THOU/MM3 (ref 0–0.4)
EOSINOPHILS ABSOLUTE: 0.7 THOU/MM3 (ref 0–0.4)
EOSINOPHILS ABSOLUTE: 1 THOU/MM3 (ref 0–0.4)
EOSINOPHILS ABSOLUTE: 1.3 THOU/MM3 (ref 0–0.4)
EOSINOPHILS ABSOLUTE: 3.2 THOU/MM3 (ref 0–0.4)
EOSINOPHILS ABSOLUTE: 3.5 THOU/MM3 (ref 0–0.4)
EOSINOPHILS ABSOLUTE: 4.7 THOU/MM3 (ref 0–0.4)
EOSINOPHILS ABSOLUTE: 6.9 THOU/MM3 (ref 0–0.4)
EOSINOPHILS ABSOLUTE: ABNORMAL /ΜL
EOSINOPHILS ABSOLUTE: ABNORMAL /ΜL
EOSINOPHILS RELATIVE PERCENT: 0 %
EOSINOPHILS RELATIVE PERCENT: 1 %
EOSINOPHILS RELATIVE PERCENT: 1 %
EOSINOPHILS RELATIVE PERCENT: 1 % (ref 1–4)
EOSINOPHILS RELATIVE PERCENT: 6 %
EOSINOPHILS RELATIVE PERCENT: ABNORMAL %
EOSINOPHILS RELATIVE PERCENT: ABNORMAL %
EPITHELIAL CELLS UA: ABNORMAL /HPF (ref 0–5)
EPITHELIAL CELLS UA: ABNORMAL /HPF (ref 0–5)
EPITHELIAL CELLS, UA: ABNORMAL /HPF
EPITHELIAL CELLS, UA: ABNORMAL /HPF
ERYTHROPOIETIN: 20 MU/ML (ref 4–27)
EXPIRATION DATE: NORMAL
FERRITIN: 765 UG/L (ref 13–150)
FERRITIN: 973 UG/L (ref 13–150)
FIO2: 21
FIO2: ABNORMAL
FOLATE: >20 NG/ML
FREE KAPPA/LAMBDA RATIO: 1.21 (ref 0.26–1.65)
GAMMA GLOBULIN %: 13 % (ref 9–20)
GAMMA GLOBULIN: 0.7 G/DL (ref 0.5–1.5)
GFR AFRICAN AMERICAN: 16 ML/MIN
GFR AFRICAN AMERICAN: 17 ML/MIN
GFR AFRICAN AMERICAN: 20 ML/MIN
GFR AFRICAN AMERICAN: 24 ML/MIN
GFR AFRICAN AMERICAN: 28 ML/MIN
GFR AFRICAN AMERICAN: 28 ML/MIN
GFR AFRICAN AMERICAN: 29 ML/MIN
GFR AFRICAN AMERICAN: 29 ML/MIN
GFR AFRICAN AMERICAN: 30 ML/MIN
GFR AFRICAN AMERICAN: 31 ML/MIN
GFR AFRICAN AMERICAN: 32 ML/MIN
GFR AFRICAN AMERICAN: 32 ML/MIN
GFR AFRICAN AMERICAN: 33 ML/MIN
GFR AFRICAN AMERICAN: 34 ML/MIN
GFR AFRICAN AMERICAN: 35 ML/MIN
GFR AFRICAN AMERICAN: 43 ML/MIN
GFR CALCULATED: 38
GFR CALCULATED: 41
GFR CALCULATED: NORMAL
GFR NON-AFRICAN AMERICAN: 14 ML/MIN
GFR NON-AFRICAN AMERICAN: 14 ML/MIN
GFR NON-AFRICAN AMERICAN: 17 ML/MIN
GFR NON-AFRICAN AMERICAN: 19 ML/MIN
GFR NON-AFRICAN AMERICAN: 23 ML/MIN
GFR NON-AFRICAN AMERICAN: 23 ML/MIN
GFR NON-AFRICAN AMERICAN: 24 ML/MIN
GFR NON-AFRICAN AMERICAN: 25 ML/MIN
GFR NON-AFRICAN AMERICAN: 26 ML/MIN
GFR NON-AFRICAN AMERICAN: 27 ML/MIN
GFR NON-AFRICAN AMERICAN: 28 ML/MIN
GFR NON-AFRICAN AMERICAN: 29 ML/MIN
GFR NON-AFRICAN AMERICAN: 36 ML/MIN
GFR SERPL CREATININE-BSD FRML MDRD: 14 ML/MIN/1.73M2
GFR SERPL CREATININE-BSD FRML MDRD: 15 ML/MIN/1.73M2
GFR SERPL CREATININE-BSD FRML MDRD: 17 ML/MIN/1.73M2
GFR SERPL CREATININE-BSD FRML MDRD: 18 ML/MIN/1.73M2
GFR SERPL CREATININE-BSD FRML MDRD: 19 ML/MIN/1.73M2
GFR SERPL CREATININE-BSD FRML MDRD: 21 ML/MIN/1.73M2
GFR SERPL CREATININE-BSD FRML MDRD: 24 ML/MIN/1.73M2
GFR SERPL CREATININE-BSD FRML MDRD: 29 ML/MIN/1.73M2
GFR SERPL CREATININE-BSD FRML MDRD: 41 ML/MIN/1.73M2
GFR SERPL CREATININE-BSD FRML MDRD: 41 ML/MIN/1.73M2
GFR SERPL CREATININE-BSD FRML MDRD: 44 ML/MIN/1.73M2
GFR SERPL CREATININE-BSD FRML MDRD: 48 ML/MIN/1.73M2
GFR SERPL CREATININE-BSD FRML MDRD: ABNORMAL ML/MIN/{1.73_M2}
GIARDIA LAMBLIA PCR: NOT DETECTED
GLOBULIN: ABNORMAL G/DL (ref 1.5–3.8)
GLUCOSE BLD-MCNC: 104 MG/DL (ref 65–105)
GLUCOSE BLD-MCNC: 105 MG/DL (ref 70–108)
GLUCOSE BLD-MCNC: 105 MG/DL (ref 70–108)
GLUCOSE BLD-MCNC: 107 MG/DL (ref 70–108)
GLUCOSE BLD-MCNC: 108 MG/DL (ref 65–105)
GLUCOSE BLD-MCNC: 108 MG/DL (ref 70–108)
GLUCOSE BLD-MCNC: 108 MG/DL (ref 70–108)
GLUCOSE BLD-MCNC: 112 MG/DL (ref 70–108)
GLUCOSE BLD-MCNC: 112 MG/DL (ref 70–108)
GLUCOSE BLD-MCNC: 115 MG/DL (ref 65–105)
GLUCOSE BLD-MCNC: 119 MG/DL (ref 70–108)
GLUCOSE BLD-MCNC: 120 MG/DL (ref 70–108)
GLUCOSE BLD-MCNC: 128 MG/DL (ref 70–108)
GLUCOSE BLD-MCNC: 131 MG/DL (ref 65–105)
GLUCOSE BLD-MCNC: 137 MG/DL (ref 70–99)
GLUCOSE BLD-MCNC: 138 MG/DL (ref 65–105)
GLUCOSE BLD-MCNC: 138 MG/DL (ref 70–108)
GLUCOSE BLD-MCNC: 139 MG/DL (ref 65–105)
GLUCOSE BLD-MCNC: 143 MG/DL (ref 70–108)
GLUCOSE BLD-MCNC: 163 MG/DL (ref 65–105)
GLUCOSE BLD-MCNC: 168 MG/DL (ref 65–105)
GLUCOSE BLD-MCNC: 169 MG/DL (ref 65–105)
GLUCOSE BLD-MCNC: 170 MG/DL (ref 70–99)
GLUCOSE BLD-MCNC: 174 MG/DL (ref 65–105)
GLUCOSE BLD-MCNC: 174 MG/DL (ref 70–99)
GLUCOSE BLD-MCNC: 184 MG/DL (ref 70–99)
GLUCOSE BLD-MCNC: 186 MG/DL (ref 65–105)
GLUCOSE BLD-MCNC: 202 MG/DL (ref 65–105)
GLUCOSE BLD-MCNC: 248 MG/DL (ref 65–105)
GLUCOSE BLD-MCNC: 42 MG/DL (ref 70–108)
GLUCOSE BLD-MCNC: 45 MG/DL (ref 70–108)
GLUCOSE BLD-MCNC: 47 MG/DL (ref 70–108)
GLUCOSE BLD-MCNC: 52 MG/DL (ref 70–108)
GLUCOSE BLD-MCNC: 54 MG/DL (ref 70–99)
GLUCOSE BLD-MCNC: 56 MG/DL (ref 70–108)
GLUCOSE BLD-MCNC: 56 MG/DL (ref 70–108)
GLUCOSE BLD-MCNC: 58 MG/DL (ref 70–108)
GLUCOSE BLD-MCNC: 58 MG/DL (ref 70–108)
GLUCOSE BLD-MCNC: 59 MG/DL (ref 65–105)
GLUCOSE BLD-MCNC: 59 MG/DL (ref 70–108)
GLUCOSE BLD-MCNC: 61 MG/DL (ref 70–99)
GLUCOSE BLD-MCNC: 62 MG/DL (ref 70–108)
GLUCOSE BLD-MCNC: 63 MG/DL (ref 70–108)
GLUCOSE BLD-MCNC: 64 MG/DL (ref 70–108)
GLUCOSE BLD-MCNC: 65 MG/DL (ref 65–105)
GLUCOSE BLD-MCNC: 66 MG/DL (ref 65–105)
GLUCOSE BLD-MCNC: 66 MG/DL (ref 65–105)
GLUCOSE BLD-MCNC: 66 MG/DL (ref 70–108)
GLUCOSE BLD-MCNC: 67 MG/DL
GLUCOSE BLD-MCNC: 67 MG/DL (ref 70–99)
GLUCOSE BLD-MCNC: 68 MG/DL (ref 70–99)
GLUCOSE BLD-MCNC: 68 MG/DL (ref 70–99)
GLUCOSE BLD-MCNC: 68 MG/DL (ref 74–100)
GLUCOSE BLD-MCNC: 70 MG/DL (ref 70–99)
GLUCOSE BLD-MCNC: 70 MG/DL (ref 70–99)
GLUCOSE BLD-MCNC: 71 MG/DL (ref 70–108)
GLUCOSE BLD-MCNC: 74 MG/DL (ref 70–108)
GLUCOSE BLD-MCNC: 74 MG/DL (ref 70–99)
GLUCOSE BLD-MCNC: 75 MG/DL (ref 70–108)
GLUCOSE BLD-MCNC: 75 MG/DL (ref 70–99)
GLUCOSE BLD-MCNC: 76 MG/DL (ref 65–105)
GLUCOSE BLD-MCNC: 76 MG/DL (ref 70–108)
GLUCOSE BLD-MCNC: 77 MG/DL (ref 70–108)
GLUCOSE BLD-MCNC: 78 MG/DL (ref 65–105)
GLUCOSE BLD-MCNC: 78 MG/DL (ref 70–108)
GLUCOSE BLD-MCNC: 78 MG/DL (ref 70–99)
GLUCOSE BLD-MCNC: 79 MG/DL (ref 70–108)
GLUCOSE BLD-MCNC: 79 MG/DL (ref 70–108)
GLUCOSE BLD-MCNC: 79 MG/DL (ref 70–99)
GLUCOSE BLD-MCNC: 80 MG/DL (ref 65–105)
GLUCOSE BLD-MCNC: 80 MG/DL (ref 70–99)
GLUCOSE BLD-MCNC: 82 MG/DL (ref 70–108)
GLUCOSE BLD-MCNC: 83 MG/DL (ref 70–108)
GLUCOSE BLD-MCNC: 84 MG/DL (ref 70–108)
GLUCOSE BLD-MCNC: 84 MG/DL (ref 70–99)
GLUCOSE BLD-MCNC: 85 MG/DL
GLUCOSE BLD-MCNC: 85 MG/DL
GLUCOSE BLD-MCNC: 85 MG/DL (ref 70–99)
GLUCOSE BLD-MCNC: 86 MG/DL (ref 70–99)
GLUCOSE BLD-MCNC: 87 MG/DL (ref 70–108)
GLUCOSE BLD-MCNC: 89 MG/DL (ref 70–108)
GLUCOSE BLD-MCNC: 90 MG/DL (ref 70–108)
GLUCOSE BLD-MCNC: 90 MG/DL (ref 70–108)
GLUCOSE BLD-MCNC: 92 MG/DL (ref 70–108)
GLUCOSE BLD-MCNC: 92 MG/DL (ref 70–108)
GLUCOSE BLD-MCNC: 92 MG/DL (ref 70–99)
GLUCOSE BLD-MCNC: 93 MG/DL (ref 70–108)
GLUCOSE BLD-MCNC: 96 MG/DL (ref 70–108)
GLUCOSE BLD-MCNC: 97 MG/DL (ref 70–108)
GLUCOSE BLD-MCNC: 97 MG/DL (ref 70–108)
GLUCOSE BLD-MCNC: 97 MG/DL (ref 70–99)
GLUCOSE URINE: NEGATIVE
GLUCOSE URINE: NEGATIVE
GLUCOSE URINE: NEGATIVE MG/DL
GLUCOSE URINE: NEGATIVE MG/DL
HAPTOGLOBIN: 157 MG/DL (ref 30–200)
HBA1C MFR BLD: 5.1 %
HCO3 VENOUS: 12.7 MMOL/L (ref 22–29)
HCO3 VENOUS: 13.7 MMOL/L (ref 24–30)
HCO3: 11 MMOL/L (ref 23–28)
HCO3: 13 MMOL/L (ref 23–28)
HCO3: 8 MMOL/L (ref 23–28)
HCT VFR BLD CALC: 17.8 % (ref 36–46)
HCT VFR BLD CALC: 19.6 % (ref 36–46)
HCT VFR BLD CALC: 20 % (ref 37–47)
HCT VFR BLD CALC: 21.3 % (ref 37–47)
HCT VFR BLD CALC: 21.4 % (ref 37–47)
HCT VFR BLD CALC: 22.3 % (ref 36–46)
HCT VFR BLD CALC: 22.4 % (ref 37–47)
HCT VFR BLD CALC: 22.5 % (ref 36–46)
HCT VFR BLD CALC: 22.8 % (ref 36–46)
HCT VFR BLD CALC: 23.2 % (ref 36–46)
HCT VFR BLD CALC: 23.2 % (ref 37–47)
HCT VFR BLD CALC: 23.3 % (ref 36–46)
HCT VFR BLD CALC: 23.7 % (ref 36–46)
HCT VFR BLD CALC: 23.7 % (ref 37–47)
HCT VFR BLD CALC: 23.8 % (ref 37–47)
HCT VFR BLD CALC: 23.8 % (ref 37–47)
HCT VFR BLD CALC: 24.1 % (ref 36–46)
HCT VFR BLD CALC: 24.7 % (ref 36–46)
HCT VFR BLD CALC: 24.7 % (ref 37–47)
HCT VFR BLD CALC: 24.8 % (ref 37–47)
HCT VFR BLD CALC: 25 % (ref 36–46)
HCT VFR BLD CALC: 25.4 % (ref 36–46)
HCT VFR BLD CALC: 25.5 % (ref 36–46)
HCT VFR BLD CALC: 25.7 % (ref 36–46)
HCT VFR BLD CALC: 25.8 % (ref 36–46)
HCT VFR BLD CALC: 26 % (ref 36–46)
HCT VFR BLD CALC: 26.3 % (ref 36–46)
HCT VFR BLD CALC: 26.6 % (ref 36–46)
HCT VFR BLD CALC: 26.9 % (ref 36–46)
HCT VFR BLD CALC: 27.6 % (ref 36–46)
HCT VFR BLD CALC: 27.8 % (ref 36–46)
HCT VFR BLD CALC: 28.1 % (ref 36–46)
HCT VFR BLD CALC: 28.2 % (ref 36–46)
HCT VFR BLD CALC: 28.4 % (ref 37–47)
HCT VFR BLD CALC: 28.7 % (ref 36–46)
HCT VFR BLD CALC: 28.8 % (ref 37–47)
HCT VFR BLD CALC: 28.8 % (ref 37–47)
HCT VFR BLD CALC: 29.2 % (ref 37–47)
HCT VFR BLD CALC: 29.5 % (ref 36–46)
HCT VFR BLD CALC: 29.8 % (ref 36–46)
HCT VFR BLD CALC: 30.3 % (ref 37–47)
HCT VFR BLD CALC: 30.8 % (ref 37–47)
HCT VFR BLD CALC: 36.6 % (ref 36–46)
HDLC SERPL-MCNC: 52 MG/DL
HEMOCCULT SP1 STL QL: NEGATIVE
HEMOCCULT SP2 STL QL: NORMAL
HEMOCCULT SP3 STL QL: NORMAL
HEMOCCULT STL QL: NEGATIVE
HEMOGLOBIN: 10 GM/DL (ref 12–16)
HEMOGLOBIN: 10.2 GM/DL (ref 12–16)
HEMOGLOBIN: 11.9 G/DL (ref 12–16)
HEMOGLOBIN: 5.9 G/DL (ref 12–16)
HEMOGLOBIN: 6.2 G/DL (ref 12–16)
HEMOGLOBIN: 6.7 GM/DL (ref 12–16)
HEMOGLOBIN: 7 GM/DL (ref 12–16)
HEMOGLOBIN: 7.1 G/DL (ref 12–16)
HEMOGLOBIN: 7.1 GM/DL (ref 12–16)
HEMOGLOBIN: 7.3 GM/DL (ref 12–16)
HEMOGLOBIN: 7.4 G/DL (ref 12–16)
HEMOGLOBIN: 7.5 G/DL (ref 12–16)
HEMOGLOBIN: 7.6 GM/DL (ref 12–16)
HEMOGLOBIN: 7.7 G/DL (ref 12–16)
HEMOGLOBIN: 7.7 GM/DL (ref 12–16)
HEMOGLOBIN: 7.8 GM/DL (ref 12–16)
HEMOGLOBIN: 7.9 G/DL (ref 12–16)
HEMOGLOBIN: 7.9 GM/DL (ref 12–16)
HEMOGLOBIN: 7.9 GM/DL (ref 12–16)
HEMOGLOBIN: 8.1 G/DL (ref 12–16)
HEMOGLOBIN: 8.1 G/DL (ref 12–16)
HEMOGLOBIN: 8.3 G/DL (ref 12–16)
HEMOGLOBIN: 8.3 GM/DL (ref 12–16)
HEMOGLOBIN: 8.4 G/DL (ref 12–16)
HEMOGLOBIN: 8.4 G/DL (ref 12–16)
HEMOGLOBIN: 8.5 G/DL (ref 12–16)
HEMOGLOBIN: 8.5 G/DL (ref 12–16)
HEMOGLOBIN: 8.6 G/DL (ref 12–16)
HEMOGLOBIN: 8.6 G/DL (ref 12–16)
HEMOGLOBIN: 8.8 G/DL (ref 12–16)
HEMOGLOBIN: 9.1 G/DL (ref 12–16)
HEMOGLOBIN: 9.2 G/DL (ref 12–16)
HEMOGLOBIN: 9.2 GM/DL (ref 12–16)
HEMOGLOBIN: 9.2 GM/DL (ref 12–16)
HEMOGLOBIN: 9.3 G/DL (ref 12–16)
HEMOGLOBIN: 9.4 G/DL (ref 12–16)
HEMOGLOBIN: 9.4 GM/DL (ref 12–16)
HEMOGLOBIN: 9.5 G/DL (ref 12–16)
HEMOGLOBIN: 9.5 GM/DL (ref 12–16)
ICTOTEST: NEGATIVE
IFIO2: 21
INR BLD: 1.45 (ref 0.85–1.13)
INR BLD: 1.45 (ref 0.85–1.13)
IRON SATURATION: 25 % (ref 20–55)
IRON SATURATION: 45 % (ref 20–55)
IRON: 41 UG/DL (ref 37–145)
IRON: 77 UG/DL (ref 37–145)
KAPPA FREE LIGHT CHAINS QNT: 2.83 MG/DL (ref 0.37–1.94)
KETONES, URINE: ABNORMAL
KETONES, URINE: NEGATIVE
LACTATE DEHYDROGENASE: 176 U/L (ref 135–214)
LACTIC ACID, WHOLE BLOOD: 0.8 MMOL/L (ref 0.7–2.1)
LACTIC ACID, WHOLE BLOOD: 1.4 MMOL/L (ref 0.7–2.1)
LACTIC ACID: 0.6 MMOL/L (ref 0.5–2.2)
LACTIC ACID: 1.2 MMOL/L (ref 0.5–2.2)
LACTIC ACID: 1.5 MMOL/L (ref 0.5–2.2)
LACTIC ACID: NORMAL MMOL/L
LACTOFERRIN, QUAL: NORMAL
LAMBDA FREE LIGHT CHAINS QNT: 2.33 MG/DL (ref 0.57–2.63)
LDL CHOLESTEROL: 10 MG/DL (ref 0–130)
LEUKOCYTE ESTERASE, URINE: ABNORMAL
LIPASE: 141 U/L (ref 13–60)
LIPASE: 145 U/L (ref 13–60)
LIPASE: 48.7 U/L (ref 5.6–51.3)
LYMPHOCYTES # BLD: 11.3 %
LYMPHOCYTES # BLD: 13 %
LYMPHOCYTES # BLD: 15.6 %
LYMPHOCYTES # BLD: 17 %
LYMPHOCYTES # BLD: 19 % (ref 24–44)
LYMPHOCYTES # BLD: 20 %
LYMPHOCYTES # BLD: 20.5 %
LYMPHOCYTES # BLD: 21 %
LYMPHOCYTES # BLD: 3 %
LYMPHOCYTES # BLD: 4 %
LYMPHOCYTES # BLD: 4 %
LYMPHOCYTES # BLD: 6 %
LYMPHOCYTES # BLD: 6 %
LYMPHOCYTES # BLD: 6.6 %
LYMPHOCYTES # BLD: 7 %
LYMPHOCYTES # BLD: 7.3 %
LYMPHOCYTES # BLD: 9.9 %
LYMPHOCYTES ABSOLUTE: 0.7 THOU/MM3 (ref 1–4.8)
LYMPHOCYTES ABSOLUTE: 0.7 THOU/MM3 (ref 1–4.8)
LYMPHOCYTES ABSOLUTE: 0.8 THOU/MM3 (ref 1–4.8)
LYMPHOCYTES ABSOLUTE: 1 THOU/MM3 (ref 1–4.8)
LYMPHOCYTES ABSOLUTE: 1 THOU/MM3 (ref 1–4.8)
LYMPHOCYTES ABSOLUTE: 1.1 THOU/MM3 (ref 1–4.8)
LYMPHOCYTES ABSOLUTE: 1.4 THOU/MM3 (ref 1–4.8)
LYMPHOCYTES ABSOLUTE: 1.5 THOU/MM3 (ref 1–4.8)
LYMPHOCYTES ABSOLUTE: ABNORMAL /ΜL
LYMPHOCYTES ABSOLUTE: ABNORMAL /ΜL
LYMPHOCYTES RELATIVE PERCENT: ABNORMAL %
LYMPHOCYTES RELATIVE PERCENT: ABNORMAL %
Lab: 9.5 MG/DL (ref 3–6)
Lab: ABNORMAL
Lab: NORMAL
MAGNESIUM: 1 MG/DL (ref 1.6–2.4)
MAGNESIUM: 1 MG/DL (ref 1.6–2.4)
MAGNESIUM: 1 MG/DL (ref 1.6–2.6)
MAGNESIUM: 1.3 MG/DL (ref 1.6–2.4)
MAGNESIUM: 1.3 MG/DL (ref 1.6–2.6)
MAGNESIUM: 1.5 MG/DL (ref 1.6–2.6)
MAGNESIUM: 1.6 MG/DL (ref 1.6–2.4)
MAGNESIUM: 1.6 MG/DL (ref 1.6–2.6)
MAGNESIUM: 1.6 MG/DL (ref 1.6–2.6)
MAGNESIUM: 1.7 MG/DL (ref 1.6–2.4)
MAGNESIUM: 1.7 MG/DL (ref 1.6–2.6)
MAGNESIUM: 1.8 MG/DL (ref 1.6–2.6)
MAGNESIUM: 2 MG/DL (ref 1.6–2.6)
MAGNESIUM: 2.4 MG/DL (ref 1.6–2.4)
MCH RBC QN AUTO: 26.6 PG (ref 26–34)
MCH RBC QN AUTO: 26.8 PG (ref 26–34)
MCH RBC QN AUTO: 26.9 PG (ref 27–31)
MCH RBC QN AUTO: 27 PG (ref 27–31)
MCH RBC QN AUTO: 27.1 PG (ref 26–34)
MCH RBC QN AUTO: 27.4 PG (ref 26–34)
MCH RBC QN AUTO: 27.5 PG (ref 27–31)
MCH RBC QN AUTO: 27.6 PG (ref 26–34)
MCH RBC QN AUTO: 27.6 PG (ref 26–34)
MCH RBC QN AUTO: 27.9 PG (ref 26–34)
MCH RBC QN AUTO: 27.9 PG (ref 26–34)
MCH RBC QN AUTO: 28.2 PG (ref 26–34)
MCH RBC QN AUTO: 28.4 PG (ref 26–34)
MCH RBC QN AUTO: 28.5 PG (ref 26–34)
MCH RBC QN AUTO: 28.5 PG (ref 26–34)
MCH RBC QN AUTO: 28.7 PG (ref 26–34)
MCH RBC QN AUTO: 29 PG (ref 27–31)
MCH RBC QN AUTO: 29.1 PG (ref 27–31)
MCH RBC QN AUTO: 29.2 PG (ref 27–31)
MCH RBC QN AUTO: 29.2 PG (ref 27–31)
MCH RBC QN AUTO: 29.3 PG (ref 27–31)
MCH RBC QN AUTO: 29.4 PG (ref 27–31)
MCH RBC QN AUTO: 29.5 PG (ref 27–31)
MCH RBC QN AUTO: 29.8 PG (ref 27–31)
MCH RBC QN AUTO: 29.8 PG (ref 27–31)
MCH RBC QN AUTO: 30.4 PG (ref 27–31)
MCH RBC QN AUTO: ABNORMAL PG
MCH RBC QN AUTO: ABNORMAL PG
MCHC RBC AUTO-ENTMCNC: 30.5 G/DL (ref 31–37)
MCHC RBC AUTO-ENTMCNC: 31.3 G/DL (ref 31–37)
MCHC RBC AUTO-ENTMCNC: 31.4 G/DL (ref 31–37)
MCHC RBC AUTO-ENTMCNC: 31.4 G/DL (ref 31–37)
MCHC RBC AUTO-ENTMCNC: 31.6 G/DL (ref 31–37)
MCHC RBC AUTO-ENTMCNC: 31.8 G/DL (ref 31–37)
MCHC RBC AUTO-ENTMCNC: 31.8 G/DL (ref 31–37)
MCHC RBC AUTO-ENTMCNC: 32 G/DL (ref 31–37)
MCHC RBC AUTO-ENTMCNC: 32 G/DL (ref 31–37)
MCHC RBC AUTO-ENTMCNC: 32 GM/DL (ref 33–37)
MCHC RBC AUTO-ENTMCNC: 32.5 GM/DL (ref 33–37)
MCHC RBC AUTO-ENTMCNC: 32.7 G/DL (ref 31–37)
MCHC RBC AUTO-ENTMCNC: 32.8 G/DL (ref 31–37)
MCHC RBC AUTO-ENTMCNC: 32.9 G/DL (ref 31–37)
MCHC RBC AUTO-ENTMCNC: 32.9 GM/DL (ref 33–37)
MCHC RBC AUTO-ENTMCNC: 32.9 GM/DL (ref 33–37)
MCHC RBC AUTO-ENTMCNC: 33.1 GM/DL (ref 33–37)
MCHC RBC AUTO-ENTMCNC: 33.1 GM/DL (ref 33–37)
MCHC RBC AUTO-ENTMCNC: 33.3 GM/DL (ref 33–37)
MCHC RBC AUTO-ENTMCNC: 33.3 GM/DL (ref 33–37)
MCHC RBC AUTO-ENTMCNC: 33.5 G/DL (ref 31–37)
MCHC RBC AUTO-ENTMCNC: 33.7 GM/DL (ref 33–37)
MCHC RBC AUTO-ENTMCNC: 34 GM/DL (ref 33–37)
MCHC RBC AUTO-ENTMCNC: ABNORMAL G/DL
MCHC RBC AUTO-ENTMCNC: ABNORMAL G/DL
MCV RBC AUTO: 81.5 FL (ref 81–99)
MCV RBC AUTO: 81.7 FL (ref 81–99)
MCV RBC AUTO: 82.2 FL (ref 80–100)
MCV RBC AUTO: 82.9 FL (ref 80–100)
MCV RBC AUTO: 83 FL (ref 81–99)
MCV RBC AUTO: 83.3 FL (ref 80–100)
MCV RBC AUTO: 85.3 FL (ref 80–100)
MCV RBC AUTO: 85.8 FL (ref 80–100)
MCV RBC AUTO: 86.2 FL (ref 80–100)
MCV RBC AUTO: 86.9 FL (ref 80–100)
MCV RBC AUTO: 87.6 FL (ref 80–100)
MCV RBC AUTO: 88 FL (ref 81–99)
MCV RBC AUTO: 88.2 FL (ref 81–99)
MCV RBC AUTO: 88.8 FL (ref 81–99)
MCV RBC AUTO: 89 FL (ref 80–100)
MCV RBC AUTO: 89.6 FL (ref 81–99)
MCV RBC AUTO: 89.7 FL (ref 80–100)
MCV RBC AUTO: 89.8 FL (ref 80–100)
MCV RBC AUTO: 89.8 FL (ref 81–99)
MCV RBC AUTO: 90 FL (ref 81–99)
MCV RBC AUTO: 91 FL (ref 81–99)
MCV RBC AUTO: 91.1 FL (ref 80–100)
MCV RBC AUTO: 91.4 FL (ref 80–100)
MCV RBC AUTO: 91.7 FL (ref 81–99)
MCV RBC AUTO: ABNORMAL FL
MCV RBC AUTO: ABNORMAL FL
METHEMOGLOBIN: ABNORMAL % (ref 0–1.5)
MISCELLANEOUS 2: ABNORMAL
MISCELLANEOUS 2: ABNORMAL
MODE: ABNORMAL
MODE: ABNORMAL
MONOCYTES # BLD: 0 %
MONOCYTES # BLD: 1 %
MONOCYTES # BLD: 2 %
MONOCYTES # BLD: 2 %
MONOCYTES # BLD: 2 % (ref 1–7)
MONOCYTES # BLD: 2.6 %
MONOCYTES # BLD: 3.6 %
MONOCYTES # BLD: 4 %
MONOCYTES # BLD: 5 %
MONOCYTES # BLD: 6 %
MONOCYTES # BLD: 6.4 %
MONOCYTES # BLD: 7.3 %
MONOCYTES # BLD: 7.6 %
MONOCYTES # BLD: 7.9 %
MONOCYTES # BLD: 8.1 %
MONOCYTES ABSOLUTE: 0 THOU/MM3 (ref 0.4–1.3)
MONOCYTES ABSOLUTE: 0.2 THOU/MM3 (ref 0.4–1.3)
MONOCYTES ABSOLUTE: 0.3 THOU/MM3 (ref 0.4–1.3)
MONOCYTES ABSOLUTE: 0.4 THOU/MM3 (ref 0.4–1.3)
MONOCYTES ABSOLUTE: 0.5 THOU/MM3 (ref 0.4–1.3)
MONOCYTES ABSOLUTE: 0.5 THOU/MM3 (ref 0.4–1.3)
MONOCYTES ABSOLUTE: 0.8 THOU/MM3 (ref 0.4–1.3)
MONOCYTES ABSOLUTE: 0.8 THOU/MM3 (ref 0.4–1.3)
MONOCYTES ABSOLUTE: ABNORMAL /ΜL
MONOCYTES ABSOLUTE: ABNORMAL /ΜL
MONOCYTES RELATIVE PERCENT: ABNORMAL %
MONOCYTES RELATIVE PERCENT: ABNORMAL %
MORPHOLOGY: ABNORMAL
MUCUS: ABNORMAL
MUCUS: ABNORMAL
NEGATIVE BASE EXCESS, VEN: 10 (ref 0–2)
NEGATIVE BASE EXCESS, VEN: 10.2 MMOL/L (ref 0–2)
NEUTROPHILS ABSOLUTE: ABNORMAL /ΜL
NEUTROPHILS ABSOLUTE: ABNORMAL /ΜL
NEUTROPHILS RELATIVE PERCENT: ABNORMAL %
NEUTROPHILS RELATIVE PERCENT: ABNORMAL %
NITRITE, URINE: NEGATIVE
NITRITE, URINE: NEGATIVE
NITRITE, URINE: POSITIVE
NITRITE, URINE: POSITIVE
NOROVIRUS GI GII PCR: NOT DETECTED
NOTIFICATION TIME: ABNORMAL
NOTIFICATION: ABNORMAL
NUCLEATED RED BLOOD CELLS: 0 /100 WBC
O2 DEVICE/FLOW/%: ABNORMAL
O2 DEVICE/FLOW/%: ABNORMAL
O2 SAT, VEN: 76.2 % (ref 60–85)
O2 SAT, VEN: 98 % (ref 60–85)
O2 SATURATION: 97 %
O2 SATURATION: 98 %
O2 SATURATION: 98 %
ORGANISM: ABNORMAL
OSMOLALITY CALCULATION: 275.2 MOSMOL/KG (ref 275–300)
OSMOLALITY CALCULATION: 286.9 MOSMOL/KG (ref 275–300)
OTHER OBSERVATIONS UA: ABNORMAL
OTHER OBSERVATIONS UA: ABNORMAL
OVALOCYTES: ABNORMAL
OXYHEMOGLOBIN: ABNORMAL % (ref 95–98)
PATHOLOGIST REVIEW: NORMAL
PATHOLOGIST: ABNORMAL
PATIENT TEMP: 37
PATIENT TEMP: ABNORMAL
PCO2, VEN, TEMP ADJ: ABNORMAL MMHG (ref 39–55)
PCO2, VEN: 19.4 MM HG (ref 41–51)
PCO2, VEN: 24.3 (ref 39–55)
PCO2: 18 MMHG (ref 35–45)
PCO2: 20 MMHG (ref 35–45)
PCO2: 22 MMHG (ref 35–45)
PDW BLD-RTO: 15.5 % (ref 11.5–14.5)
PDW BLD-RTO: 15.7 % (ref 11.5–14.5)
PDW BLD-RTO: 16 % (ref 11.5–14.5)
PDW BLD-RTO: 16.3 % (ref 11.5–14.5)
PDW BLD-RTO: 16.6 % (ref 11.5–14.5)
PDW BLD-RTO: 16.7 % (ref 11.5–14.5)
PDW BLD-RTO: 17.1 % (ref 11.5–14.5)
PDW BLD-RTO: 17.3 % (ref 12.5–15.4)
PDW BLD-RTO: 17.3 % (ref 12.5–15.4)
PDW BLD-RTO: 17.4 % (ref 12.5–15.4)
PDW BLD-RTO: 17.6 % (ref 11.5–14.5)
PDW BLD-RTO: 18.4 % (ref 12.5–15.4)
PDW BLD-RTO: 19.8 % (ref 12.5–15.4)
PDW BLD-RTO: 21.3 % (ref 11.5–14.5)
PDW BLD-RTO: 22.9 % (ref 11.5–14.5)
PDW BLD-RTO: 23.2 % (ref 11.5–14.5)
PDW BLD-RTO: 23.2 % (ref 11.5–14.5)
PDW BLD-RTO: 23.9 % (ref 11.5–14.5)
PDW BLD-RTO: 24.2 % (ref 11.5–14.5)
PDW BLD-RTO: 26.2 % (ref 12.5–15.4)
PDW BLD-RTO: ABNORMAL %
PDW BLD-RTO: ABNORMAL % (ref 12.5–15.4)
PEEP/CPAP: ABNORMAL
PH BLOOD GAS: 7.27 (ref 7.35–7.45)
PH BLOOD GAS: 7.37 (ref 7.35–7.45)
PH BLOOD GAS: 7.39 (ref 7.35–7.45)
PH UA: 5
PH UA: 5
PH UA: 5.5 (ref 5–8)
PH UA: 6 (ref 5–8)
PH VENOUS: 7.29 (ref 7.33–7.43)
PH VENOUS: 7.37 (ref 7.32–7.42)
PH VENOUS: 7.39 (ref 7.33–7.43)
PH VENOUS: 7.42 (ref 7.32–7.43)
PH, VEN, TEMP ADJ: ABNORMAL (ref 7.32–7.42)
PHOSPHORUS: 1.4 MG/DL (ref 2.6–4.5)
PHOSPHORUS: 1.7 MG/DL (ref 2.4–4.7)
PHOSPHORUS: 1.7 MG/DL (ref 2.6–4.5)
PHOSPHORUS: 2.2 MG/DL (ref 2.4–4.7)
PHOSPHORUS: 2.3 MG/DL (ref 2.6–4.5)
PHOSPHORUS: 2.4 MG/DL (ref 2.4–4.7)
PHOSPHORUS: 2.4 MG/DL (ref 2.6–4.5)
PHOSPHORUS: 2.5 MG/DL (ref 2.4–4.7)
PHOSPHORUS: 2.7 MG/DL (ref 2.4–4.7)
PHOSPHORUS: 2.8 MG/DL (ref 2.4–4.7)
PHOSPHORUS: 2.8 MG/DL (ref 2.6–4.5)
PHOSPHORUS: 2.9 MG/DL (ref 2.6–4.5)
PHOSPHORUS: 3.1 MG/DL (ref 2.4–4.7)
PHOSPHORUS: 3.1 MG/DL (ref 2.4–4.7)
PHOSPHORUS: 3.2 MG/DL (ref 2.6–4.5)
PLATELET # BLD: 113 THOU/MM3 (ref 130–400)
PLATELET # BLD: 119 THOU/MM3 (ref 130–400)
PLATELET # BLD: 161 THOU/MM3 (ref 130–400)
PLATELET # BLD: 169 THOU/MM3 (ref 130–400)
PLATELET # BLD: 188 THOU/MM3 (ref 130–400)
PLATELET # BLD: 189 THOU/MM3 (ref 130–400)
PLATELET # BLD: 193 THOU/MM3 (ref 130–400)
PLATELET # BLD: 202 THOU/MM3 (ref 130–400)
PLATELET # BLD: 204 K/UL (ref 140–450)
PLATELET # BLD: 210 K/UL (ref 140–450)
PLATELET # BLD: 218 K/UL (ref 130–400)
PLATELET # BLD: 218 K/UL (ref 130–400)
PLATELET # BLD: 225 K/UL (ref 140–450)
PLATELET # BLD: 226 K/UL (ref 140–450)
PLATELET # BLD: 244 THOU/MM3 (ref 130–400)
PLATELET # BLD: 252 K/UL (ref 140–450)
PLATELET # BLD: 253 THOU/MM3 (ref 130–400)
PLATELET # BLD: 255 THOU/MM3 (ref 130–400)
PLATELET # BLD: 258 THOU/MM3 (ref 130–400)
PLATELET # BLD: 272 THOU/MM3 (ref 130–400)
PLATELET # BLD: 274 K/ΜL
PLATELET # BLD: 302 K/ΜL
PLATELET # BLD: 315 K/UL (ref 130–400)
PLATELET # BLD: 318 K/UL (ref 130–400)
PLATELET # BLD: 318 K/UL (ref 130–400)
PLATELET # BLD: 355 K/UL (ref 130–400)
PLATELET # BLD: 504 K/UL (ref 140–450)
PLATELET # BLD: ABNORMAL K/UL (ref 140–450)
PLATELET # BLD: NORMAL K/UL (ref 140–450)
PLATELET ESTIMATE: ABNORMAL
PLATELET ESTIMATE: ADEQUATE
PLESIOMONAS SHIGELLOIDES PCR: NOT DETECTED
PMV BLD AUTO: 10 FL (ref 6–12)
PMV BLD AUTO: 10.1 FL (ref 6–12)
PMV BLD AUTO: 7.7 MCM (ref 7.4–10.4)
PMV BLD AUTO: 7.8 MCM (ref 7.4–10.4)
PMV BLD AUTO: 7.8 MCM (ref 7.4–10.4)
PMV BLD AUTO: 7.9 FL (ref 6–12)
PMV BLD AUTO: 8 FL (ref 6–12)
PMV BLD AUTO: 8 MCM (ref 7.4–10.4)
PMV BLD AUTO: 8.1 FL (ref 6–12)
PMV BLD AUTO: 8.1 MCM (ref 7.4–10.4)
PMV BLD AUTO: 8.1 MCM (ref 7.4–10.4)
PMV BLD AUTO: 8.2 MCM (ref 7.4–10.4)
PMV BLD AUTO: 8.3 FL (ref 6–12)
PMV BLD AUTO: 8.3 MCM (ref 7.4–10.4)
PMV BLD AUTO: 8.4 MCM (ref 7.4–10.4)
PMV BLD AUTO: 8.4 MCM (ref 7.4–10.4)
PMV BLD AUTO: 8.5 FL (ref 6–12)
PMV BLD AUTO: 8.5 MCM (ref 7.4–10.4)
PMV BLD AUTO: 8.6 FL (ref 6–12)
PMV BLD AUTO: 8.6 MCM (ref 7.4–10.4)
PMV BLD AUTO: 8.6 MCM (ref 7.4–10.4)
PMV BLD AUTO: 8.9 FL (ref 6–12)
PMV BLD AUTO: 9.3 FL (ref 6–12)
PMV BLD AUTO: 9.9 FL (ref 6–12)
PMV BLD AUTO: ABNORMAL FL
PMV BLD AUTO: ABNORMAL FL
PMV BLD AUTO: ABNORMAL FL (ref 6–12)
PMV BLD AUTO: ABNORMAL FL (ref 6–12)
PO2, VEN, TEMP ADJ: ABNORMAL MMHG (ref 30–50)
PO2, VEN: 104.6 MM HG (ref 30–50)
PO2, VEN: 40.7 (ref 30–50)
PO2: 107 MMHG (ref 71–104)
PO2: 114 MMHG (ref 71–104)
PO2: 85 MMHG (ref 71–104)
POC PCO2 TEMP: ABNORMAL MM HG
POC PH TEMP: ABNORMAL
POC PO2 TEMP: ABNORMAL MM HG
POIKILOCYTES: ABNORMAL
POIKILOCYTES: SLIGHT
POIKILOCYTES: SLIGHT
POSITIVE BASE EXCESS, VEN: ABNORMAL (ref 0–3)
POSITIVE BASE EXCESS, VEN: ABNORMAL MMOL/L (ref 0–2)
POTASSIUM SERPL-SCNC: 2.4 MMOL/L (ref 3.7–5.3)
POTASSIUM SERPL-SCNC: 2.6 MMOL/L
POTASSIUM SERPL-SCNC: 2.7 MMOL/L
POTASSIUM SERPL-SCNC: 2.7 MMOL/L (ref 3.7–5.3)
POTASSIUM SERPL-SCNC: 2.7 MMOL/L (ref 3.7–5.3)
POTASSIUM SERPL-SCNC: 2.8 MMOL/L
POTASSIUM SERPL-SCNC: 3.2 MEQ/L (ref 3.5–5.2)
POTASSIUM SERPL-SCNC: 3.3 MEQ/L (ref 3.5–5.2)
POTASSIUM SERPL-SCNC: 3.4 MMOL/L (ref 3.7–5.3)
POTASSIUM SERPL-SCNC: 3.5 MEQ/L (ref 3.5–5.2)
POTASSIUM SERPL-SCNC: 3.5 MMOL/L (ref 3.7–5.3)
POTASSIUM SERPL-SCNC: 3.5 MMOL/L (ref 3.7–5.3)
POTASSIUM SERPL-SCNC: 3.6 MMOL/L (ref 3.7–5.3)
POTASSIUM SERPL-SCNC: 3.7 MEQ/L (ref 3.5–5.2)
POTASSIUM SERPL-SCNC: 3.7 MEQ/L (ref 3.5–5.2)
POTASSIUM SERPL-SCNC: 3.7 MMOL/L (ref 3.7–5.3)
POTASSIUM SERPL-SCNC: 3.8 MMOL/L (ref 3.7–5.3)
POTASSIUM SERPL-SCNC: 3.8 MMOL/L (ref 3.7–5.3)
POTASSIUM SERPL-SCNC: 3.9 MEQ/L (ref 3.5–5.2)
POTASSIUM SERPL-SCNC: 3.9 MMOL/L (ref 3.7–5.3)
POTASSIUM SERPL-SCNC: 3.9 MMOL/L (ref 3.7–5.3)
POTASSIUM SERPL-SCNC: 4 MEQ/L (ref 3.5–5.2)
POTASSIUM SERPL-SCNC: 4 MMOL/L (ref 3.7–5.3)
POTASSIUM SERPL-SCNC: 4.2 MMOL/L (ref 3.7–5.3)
POTASSIUM SERPL-SCNC: 4.5 MEQ/L (ref 3.5–5.2)
POTASSIUM SERPL-SCNC: 4.6 MMOL/L (ref 3.7–5.3)
POTASSIUM SERPL-SCNC: 4.8 MMOL/L (ref 3.7–5.3)
POTASSIUM SERPL-SCNC: 4.9 MEQ/L (ref 3.5–5.2)
POTASSIUM SERPL-SCNC: 5.5 MEQ/L (ref 3.5–5.2)
POTASSIUM SERPL-SCNC: 5.6 MEQ/L (ref 3.5–5.2)
POTASSIUM, URINE: 52 MEQ/L
PREALBUMIN: 17.1 MG/DL (ref 20–40)
PREALBUMIN: 20.6 MG/DL (ref 20–40)
PREALBUMIN: 29.5 MG/DL (ref 20–40)
PRO-BNP: 3549 PG/ML (ref 0–1800)
PROTEIN ELECTROPHORESIS, SERUM: ABNORMAL
PROTEIN UA: 30
PROTEIN UA: ABNORMAL
PSV: ABNORMAL
PT. POSITION: ABNORMAL
PTH INTACT: 75.9 PG/ML (ref 15–65)
RBC # BLD: 2.24 M/UL (ref 4–5.2)
RBC # BLD: 2.28 MILL/MM3 (ref 4.2–5.4)
RBC # BLD: 2.41 MILL/MM3 (ref 4.2–5.4)
RBC # BLD: 2.41 MILL/MM3 (ref 4.2–5.4)
RBC # BLD: 2.49 MILL/MM3 (ref 4.2–5.4)
RBC # BLD: 2.59 M/UL (ref 4–5.2)
RBC # BLD: 2.59 MILL/MM3 (ref 4.2–5.4)
RBC # BLD: 2.6 M/UL (ref 4–5.2)
RBC # BLD: 2.65 MILL/MM3 (ref 4.2–5.4)
RBC # BLD: 2.66 MILL/MM3 (ref 4.2–5.4)
RBC # BLD: 2.67 M/UL (ref 4–5.2)
RBC # BLD: 2.77 MILL/MM3 (ref 4.2–5.4)
RBC # BLD: 2.93 M/UL (ref 4–5.2)
RBC # BLD: 2.95 M/UL (ref 4–5.2)
RBC # BLD: 3.01 M/UL (ref 4–5.2)
RBC # BLD: 3.1 MILL/MM3 (ref 4.2–5.4)
RBC # BLD: 3.11 M/UL (ref 4–5.2)
RBC # BLD: 3.13 M/UL (ref 4–5.2)
RBC # BLD: 3.16 M/UL (ref 4–5.2)
RBC # BLD: 3.17 MILL/MM3 (ref 4.2–5.4)
RBC # BLD: 3.23 10^6/ΜL
RBC # BLD: 3.24 M/UL (ref 4–5.2)
RBC # BLD: 3.25 10^6/ΜL
RBC # BLD: 3.28 M/UL (ref 4–5.2)
RBC # BLD: 3.54 MILL/MM3 (ref 4.2–5.4)
RBC # BLD: 3.71 MILL/MM3 (ref 4.2–5.4)
RBC # BLD: 3.71 MILL/MM3 (ref 4.2–5.4)
RBC # BLD: 4.21 M/UL (ref 4–5.2)
RBC # BLD: ABNORMAL 10*6/UL
RBC UA: ABNORMAL /HPF (ref 0–2)
RBC UA: ABNORMAL /HPF (ref 0–2)
RBC URINE: ABNORMAL /HPF
RBC URINE: ABNORMAL /HPF
RENAL EPITHELIAL, UA: ABNORMAL
RENAL EPITHELIAL, UA: ABNORMAL
RENAL EPITHELIAL, UA: ABNORMAL /HPF
RENAL EPITHELIAL, UA: ABNORMAL /HPF
RESPIRATORY RATE: ABNORMAL
RETIC %: 0.3 % (ref 0.5–2)
RETIC %: 2.1 % (ref 0.5–2)
RH FACTOR: NORMAL
ROTAVIRUS A PCR: NOT DETECTED
ROULEAUX: SLIGHT
SALICYLATE LEVEL: 1 MG/DL (ref 3–10)
SALMONELLA PCR: NORMAL
SALMONELLA PCR: NOT DETECTED
SAMPLE SITE: ABNORMAL
SAMPLE SITE: ABNORMAL
SAPOVIRUS PCR: NOT DETECTED
SEG NEUTROPHILS: 55 %
SEG NEUTROPHILS: 63 %
SEG NEUTROPHILS: 64.1 %
SEG NEUTROPHILS: 65.1 %
SEG NEUTROPHILS: 66 %
SEG NEUTROPHILS: 67 %
SEG NEUTROPHILS: 68.4 %
SEG NEUTROPHILS: 69 %
SEG NEUTROPHILS: 72.6 %
SEG NEUTROPHILS: 73.4 %
SEG NEUTROPHILS: 76 %
SEG NEUTROPHILS: 77 %
SEG NEUTROPHILS: 78 % (ref 36–66)
SEG NEUTROPHILS: 79 %
SEG NEUTROPHILS: 80.5 %
SEG NEUTROPHILS: 86.1 %
SEG NEUTROPHILS: 91 %
SEGMENTED NEUTROPHILS ABSOLUTE COUNT: 10.6 THOU/MM3 (ref 1.8–7.7)
SEGMENTED NEUTROPHILS ABSOLUTE COUNT: 10.6 THOU/MM3 (ref 1.8–7.7)
SEGMENTED NEUTROPHILS ABSOLUTE COUNT: 11 THOU/MM3 (ref 1.8–7.7)
SEGMENTED NEUTROPHILS ABSOLUTE COUNT: 11.4 THOU/MM3 (ref 1.8–7.7)
SEGMENTED NEUTROPHILS ABSOLUTE COUNT: 11.8 THOU/MM3 (ref 1.8–7.7)
SEGMENTED NEUTROPHILS ABSOLUTE COUNT: 15.3 THOU/MM3 (ref 1.8–7.7)
SEGMENTED NEUTROPHILS ABSOLUTE COUNT: 19.5 THOU/MM3 (ref 1.8–7.7)
SEGMENTED NEUTROPHILS ABSOLUTE COUNT: 3.6 THOU/MM3 (ref 1.8–7.7)
SEGMENTED NEUTROPHILS ABSOLUTE COUNT: 3.7 THOU/MM3 (ref 1.8–7.7)
SEGMENTED NEUTROPHILS ABSOLUTE COUNT: 4.4 THOU/MM3 (ref 1.8–7.7)
SEGMENTED NEUTROPHILS ABSOLUTE COUNT: 4.6 K/UL (ref 1.8–7.7)
SEGMENTED NEUTROPHILS ABSOLUTE COUNT: 4.6 THOU/MM3 (ref 1.8–7.7)
SEGMENTED NEUTROPHILS ABSOLUTE COUNT: 4.8 THOU/MM3 (ref 1.8–7.7)
SEGMENTED NEUTROPHILS ABSOLUTE COUNT: 6.5 K/UL (ref 1.8–7.7)
SEGMENTED NEUTROPHILS ABSOLUTE COUNT: 7.39 K/UL (ref 1.8–7.7)
SEGMENTED NEUTROPHILS ABSOLUTE COUNT: 9.54 K/UL (ref 1.8–7.7)
SEGMENTED NEUTROPHILS ABSOLUTE COUNT: 9.67 K/UL (ref 1.8–7.7)
SEROTONIN (5-HT) BLOOD: NORMAL
SET RATE: ABNORMAL
SHIGATOXIN GENE PCR: NORMAL
SHIGELLA SP PCR: NORMAL
SODIUM BLD-SCNC: 137 MEQ/L (ref 135–145)
SODIUM BLD-SCNC: 138 MEQ/L (ref 135–145)
SODIUM BLD-SCNC: 138 MMOL/L (ref 135–144)
SODIUM BLD-SCNC: 139 MEQ/L (ref 135–145)
SODIUM BLD-SCNC: 139 MMOL/L (ref 135–144)
SODIUM BLD-SCNC: 140 MEQ/L (ref 135–145)
SODIUM BLD-SCNC: 140 MEQ/L (ref 135–145)
SODIUM BLD-SCNC: 141 MEQ/L (ref 135–145)
SODIUM BLD-SCNC: 141 MMOL/L (ref 135–144)
SODIUM BLD-SCNC: 142 MEQ/L (ref 135–145)
SODIUM BLD-SCNC: 142 MEQ/L (ref 135–145)
SODIUM BLD-SCNC: 142 MMOL/L
SODIUM BLD-SCNC: 142 MMOL/L
SODIUM BLD-SCNC: 142 MMOL/L (ref 135–144)
SODIUM BLD-SCNC: 143 MEQ/L (ref 135–145)
SODIUM BLD-SCNC: 143 MMOL/L (ref 135–144)
SODIUM BLD-SCNC: 144 MMOL/L (ref 135–144)
SODIUM BLD-SCNC: 145 MEQ/L (ref 135–145)
SODIUM BLD-SCNC: 145 MEQ/L (ref 135–145)
SODIUM BLD-SCNC: 145 MMOL/L (ref 135–144)
SODIUM BLD-SCNC: 146 MMOL/L
SODIUM BLD-SCNC: 146 MMOL/L (ref 135–144)
SODIUM BLD-SCNC: 146 MMOL/L (ref 135–144)
SODIUM URINE: 40 MEQ/L
SOURCE, BLOOD GAS: ABNORMAL
SPECIFIC GRAVITY UA: 1.01 (ref 1–1.03)
SPECIFIC GRAVITY UA: 1.02 (ref 1–1.03)
SPECIFIC GRAVITY, URINE: 1.01 (ref 1–1.03)
SPECIFIC GRAVITY, URINE: 1.02 (ref 1–1.03)
SPECIMEN DESCRIPTION: ABNORMAL
SPECIMEN DESCRIPTION: NORMAL
SPECIMEN: NORMAL
STATUS: ABNORMAL
STATUS: ABNORMAL
STATUS: NORMAL
SURGICAL PATHOLOGY REPORT: NORMAL
TARGET CELLS: ABNORMAL
TEXT FOR RESPIRATORY: ABNORMAL
TIME, STOOL #1: 500
TIME, STOOL #1: NORMAL
TIME, STOOL #1: NORMAL
TIME, STOOL #2: NORMAL
TIME, STOOL #3: NORMAL
TOTAL CK: 69 U/L (ref 30–135)
TOTAL CO2, VENOUS: 13 MMOL/L (ref 23–30)
TOTAL HB: ABNORMAL G/DL (ref 12–16)
TOTAL IRON BINDING CAPACITY: 162 UG/DL (ref 250–450)
TOTAL IRON BINDING CAPACITY: 171 UG/DL (ref 250–450)
TOTAL PROT. SUM,%: 100 % (ref 98–102)
TOTAL PROT. SUM: 5.5 G/DL (ref 6.3–8.2)
TOTAL PROTEIN, URINE: 49 MG/DL
TOTAL PROTEIN, URINE: 62 MG/DL
TOTAL PROTEIN: 4.1 G/DL (ref 6.1–8)
TOTAL PROTEIN: 4.1 G/DL (ref 6.1–8)
TOTAL PROTEIN: 4.2 G/DL (ref 6.1–8)
TOTAL PROTEIN: 4.3 G/DL (ref 6.1–8)
TOTAL PROTEIN: 4.7 G/DL (ref 6.1–8)
TOTAL PROTEIN: 5.1 G/DL (ref 6.1–8)
TOTAL PROTEIN: 5.4 G/DL (ref 6.4–8.3)
TOTAL PROTEIN: 5.6 G/DL (ref 6.4–8.3)
TOTAL PROTEIN: 5.8 G/DL (ref 6.4–8.3)
TOTAL RATE: ABNORMAL
TRANSFERRIN: 179 MG/DL (ref 200–360)
TRANSFUSION STATUS: NORMAL
TRANSFUSION STATUS: NORMAL
TRICHOMONAS: ABNORMAL
TRICHOMONAS: ABNORMAL
TRIGL SERPL-MCNC: 76 MG/DL
TROPONIN T: 0.04 NG/ML
TROPONIN T: 0.06 NG/ML
TROPONIN T: 0.06 NG/ML
TROPONIN T: 0.07 NG/ML
TSH SERPL DL<=0.05 MIU/L-ACNC: 2.16 MIU/L (ref 0.3–5)
TSH SERPL DL<=0.05 MIU/L-ACNC: 2.58 MIU/L (ref 0.3–5)
TURBIDITY: ABNORMAL
TURBIDITY: ABNORMAL
UNIT DIVISION: 0
UNIT DIVISION: 0
UNIT NUMBER: NORMAL
UNIT NUMBER: NORMAL
UNSATURATED IRON BINDING CAPACITY: 121 UG/DL (ref 112–347)
UNSATURATED IRON BINDING CAPACITY: 94 UG/DL (ref 112–347)
URIC ACID: 8.6 MG/DL (ref 2.4–5.7)
URIC ACID: 9.4
URIC ACID: 9.5
URINE CULTURE REFLEX: ABNORMAL
URINE CULTURE REFLEX: ABNORMAL
URINE HGB: ABNORMAL
URINE HGB: ABNORMAL
UROBILINOGEN, URINE: 0.2 EU/DL
UROBILINOGEN, URINE: 0.2 EU/DL
UROBILINOGEN, URINE: NORMAL
UROBILINOGEN, URINE: NORMAL
VIBRIO CHOLERAE PCR: NOT DETECTED
VIBRIO PCR: NOT DETECTED
VITAMIN B-12: 652 PG/ML (ref 211–946)
VITAMIN B-12: 770 PG/ML (ref 211–946)
VITAMIN D 25-HYDROXY: 18 NG/ML (ref 30–100)
VITAMIN D 25-HYDROXY: 21 NG/ML (ref 30–100)
VITAMIN D 25-HYDROXY: 26.3 NG/ML (ref 30–100)
VITAMIN D 25-HYDROXY: 5.1 NG/ML (ref 30–100)
VLDLC SERPL CALC-MCNC: NORMAL MG/DL (ref 1–30)
VRE CULTURE: ABNORMAL
VT: ABNORMAL
WBC # BLD: 10.2 K/UL (ref 3.5–11)
WBC # BLD: 10.5 K/UL (ref 3.5–11)
WBC # BLD: 12 10^3/ML
WBC # BLD: 12.4 K/UL (ref 3.5–11)
WBC # BLD: 14.1 THOU/MM3 (ref 4.8–10.8)
WBC # BLD: 16 THOU/MM3 (ref 4.8–10.8)
WBC # BLD: 16 THOU/MM3 (ref 4.8–10.8)
WBC # BLD: 18.7 THOU/MM3 (ref 4.8–10.8)
WBC # BLD: 18.9 THOU/MM3 (ref 4.8–10.8)
WBC # BLD: 19.3 THOU/MM3 (ref 4.8–10.8)
WBC # BLD: 20.1 THOU/MM3 (ref 4.8–10.8)
WBC # BLD: 22.7 THOU/MM3 (ref 4.8–10.8)
WBC # BLD: 5.4 THOU/MM3 (ref 4.8–10.8)
WBC # BLD: 5.6 K/UL (ref 3.5–11)
WBC # BLD: 5.6 THOU/MM3 (ref 4.8–10.8)
WBC # BLD: 6.1 THOU/MM3 (ref 4.8–10.8)
WBC # BLD: 6.6 THOU/MM3 (ref 4.8–10.8)
WBC # BLD: 6.8 K/UL (ref 3.5–11)
WBC # BLD: 7 THOU/MM3 (ref 4.8–10.8)
WBC # BLD: 7.6 10^3/ML
WBC # BLD: 8.1 K/UL (ref 3.5–11)
WBC # BLD: 8.1 K/UL (ref 3.5–11)
WBC # BLD: 8.7 K/UL (ref 3.5–11)
WBC # BLD: 8.9 K/UL (ref 3.5–11)
WBC # BLD: 9.1 K/UL (ref 3.5–11)
WBC # BLD: 9.1 K/UL (ref 3.5–11)
WBC # BLD: 9.5 K/UL (ref 3.5–11)
WBC # BLD: 9.6 K/UL (ref 3.5–11)
WBC # BLD: ABNORMAL 10*3/UL
WBC UA: ABNORMAL /HPF
WBC UA: ABNORMAL /HPF
WBC UA: ABNORMAL /HPF (ref 0–5)
WBC UA: ABNORMAL /HPF (ref 0–5)
YEAST: ABNORMAL
YERSINIA ENTEROCOLITICA PCR: NOT DETECTED

## 2017-01-01 PROCEDURE — 99232 SBSQ HOSP IP/OBS MODERATE 35: CPT | Performed by: INTERNAL MEDICINE

## 2017-01-01 PROCEDURE — 83605 ASSAY OF LACTIC ACID: CPT

## 2017-01-01 PROCEDURE — 6360000002 HC RX W HCPCS: Performed by: INTERNAL MEDICINE

## 2017-01-01 PROCEDURE — 85025 COMPLETE CBC W/AUTO DIFF WBC: CPT

## 2017-01-01 PROCEDURE — 2500000003 HC RX 250 WO HCPCS: Performed by: INTERNAL MEDICINE

## 2017-01-01 PROCEDURE — 84100 ASSAY OF PHOSPHORUS: CPT

## 2017-01-01 PROCEDURE — 2580000003 HC RX 258: Performed by: FAMILY MEDICINE

## 2017-01-01 PROCEDURE — 6370000000 HC RX 637 (ALT 250 FOR IP): Performed by: FAMILY MEDICINE

## 2017-01-01 PROCEDURE — 2580000003 HC RX 258: Performed by: STUDENT IN AN ORGANIZED HEALTH CARE EDUCATION/TRAINING PROGRAM

## 2017-01-01 PROCEDURE — 99284 EMERGENCY DEPT VISIT MOD MDM: CPT

## 2017-01-01 PROCEDURE — 6360000002 HC RX W HCPCS: Performed by: HOSPITALIST

## 2017-01-01 PROCEDURE — 6370000000 HC RX 637 (ALT 250 FOR IP): Performed by: INTERNAL MEDICINE

## 2017-01-01 PROCEDURE — 94640 AIRWAY INHALATION TREATMENT: CPT

## 2017-01-01 PROCEDURE — 36600 WITHDRAWAL OF ARTERIAL BLOOD: CPT

## 2017-01-01 PROCEDURE — 83735 ASSAY OF MAGNESIUM: CPT

## 2017-01-01 PROCEDURE — 2580000003 HC RX 258: Performed by: HOSPITALIST

## 2017-01-01 PROCEDURE — 99213 OFFICE O/P EST LOW 20 MIN: CPT | Performed by: HOSPITALIST

## 2017-01-01 PROCEDURE — 85610 PROTHROMBIN TIME: CPT

## 2017-01-01 PROCEDURE — 97162 PT EVAL MOD COMPLEX 30 MIN: CPT

## 2017-01-01 PROCEDURE — 82948 REAGENT STRIP/BLOOD GLUCOSE: CPT

## 2017-01-01 PROCEDURE — 84156 ASSAY OF PROTEIN URINE: CPT

## 2017-01-01 PROCEDURE — 82550 ASSAY OF CK (CPK): CPT

## 2017-01-01 PROCEDURE — 85027 COMPLETE CBC AUTOMATED: CPT

## 2017-01-01 PROCEDURE — 36415 COLL VENOUS BLD VENIPUNCTURE: CPT

## 2017-01-01 PROCEDURE — 82272 OCCULT BLD FECES 1-3 TESTS: CPT

## 2017-01-01 PROCEDURE — 6370000000 HC RX 637 (ALT 250 FOR IP): Performed by: HOSPITALIST

## 2017-01-01 PROCEDURE — 82330 ASSAY OF CALCIUM: CPT

## 2017-01-01 PROCEDURE — 96375 TX/PRO/DX INJ NEW DRUG ADDON: CPT

## 2017-01-01 PROCEDURE — 87040 BLOOD CULTURE FOR BACTERIA: CPT

## 2017-01-01 PROCEDURE — 80048 BASIC METABOLIC PNL TOTAL CA: CPT

## 2017-01-01 PROCEDURE — 87086 URINE CULTURE/COLONY COUNT: CPT

## 2017-01-01 PROCEDURE — 82947 ASSAY GLUCOSE BLOOD QUANT: CPT

## 2017-01-01 PROCEDURE — 85018 HEMOGLOBIN: CPT

## 2017-01-01 PROCEDURE — 99214 OFFICE O/P EST MOD 30 MIN: CPT | Performed by: INTERNAL MEDICINE

## 2017-01-01 PROCEDURE — 82533 TOTAL CORTISOL: CPT

## 2017-01-01 PROCEDURE — 1200000000 HC SEMI PRIVATE

## 2017-01-01 PROCEDURE — 2580000003 HC RX 258: Performed by: INTERNAL MEDICINE

## 2017-01-01 PROCEDURE — G0378 HOSPITAL OBSERVATION PER HR: HCPCS

## 2017-01-01 PROCEDURE — 73630 X-RAY EXAM OF FOOT: CPT

## 2017-01-01 PROCEDURE — 2500000003 HC RX 250 WO HCPCS: Performed by: RADIOLOGY

## 2017-01-01 PROCEDURE — 85014 HEMATOCRIT: CPT

## 2017-01-01 PROCEDURE — 96365 THER/PROPH/DIAG IV INF INIT: CPT

## 2017-01-01 PROCEDURE — 96361 HYDRATE IV INFUSION ADD-ON: CPT

## 2017-01-01 PROCEDURE — 81001 URINALYSIS AUTO W/SCOPE: CPT

## 2017-01-01 PROCEDURE — 82140 ASSAY OF AMMONIA: CPT

## 2017-01-01 PROCEDURE — 6360000002 HC RX W HCPCS: Performed by: FAMILY MEDICINE

## 2017-01-01 PROCEDURE — 83550 IRON BINDING TEST: CPT

## 2017-01-01 PROCEDURE — 80053 COMPREHEN METABOLIC PANEL: CPT

## 2017-01-01 PROCEDURE — 7100000011 HC PHASE II RECOVERY - ADDTL 15 MIN: Performed by: INTERNAL MEDICINE

## 2017-01-01 PROCEDURE — 1123F ACP DISCUSS/DSCN MKR DOCD: CPT | Performed by: INTERNAL MEDICINE

## 2017-01-01 PROCEDURE — 99231 SBSQ HOSP IP/OBS SF/LOW 25: CPT | Performed by: INTERNAL MEDICINE

## 2017-01-01 PROCEDURE — 99239 HOSP IP/OBS DSCHRG MGMT >30: CPT | Performed by: INTERNAL MEDICINE

## 2017-01-01 PROCEDURE — 3700000000 HC ANESTHESIA ATTENDED CARE: Performed by: INTERNAL MEDICINE

## 2017-01-01 PROCEDURE — 99232 SBSQ HOSP IP/OBS MODERATE 35: CPT | Performed by: FAMILY MEDICINE

## 2017-01-01 PROCEDURE — 80307 DRUG TEST PRSMV CHEM ANLYZR: CPT

## 2017-01-01 PROCEDURE — 2060000000 HC ICU INTERMEDIATE R&B

## 2017-01-01 PROCEDURE — 84260 ASSAY OF SEROTONIN: CPT

## 2017-01-01 PROCEDURE — G8400 PT W/DXA NO RESULTS DOC: HCPCS | Performed by: INTERNAL MEDICINE

## 2017-01-01 PROCEDURE — 87077 CULTURE AEROBIC IDENTIFY: CPT

## 2017-01-01 PROCEDURE — 99233 SBSQ HOSP IP/OBS HIGH 50: CPT | Performed by: HOSPITALIST

## 2017-01-01 PROCEDURE — P9603 ONE-WAY ALLOW PRORATED MILES: HCPCS

## 2017-01-01 PROCEDURE — 82040 ASSAY OF SERUM ALBUMIN: CPT

## 2017-01-01 PROCEDURE — 99232 SBSQ HOSP IP/OBS MODERATE 35: CPT | Performed by: HOSPITALIST

## 2017-01-01 PROCEDURE — 97166 OT EVAL MOD COMPLEX 45 MIN: CPT

## 2017-01-01 PROCEDURE — 7100000010 HC PHASE II RECOVERY - FIRST 15 MIN: Performed by: INTERNAL MEDICINE

## 2017-01-01 PROCEDURE — 97116 GAIT TRAINING THERAPY: CPT

## 2017-01-01 PROCEDURE — 87493 C DIFF AMPLIFIED PROBE: CPT

## 2017-01-01 PROCEDURE — G8988 SELF CARE GOAL STATUS: HCPCS

## 2017-01-01 PROCEDURE — 6370000000 HC RX 637 (ALT 250 FOR IP): Performed by: STUDENT IN AN ORGANIZED HEALTH CARE EDUCATION/TRAINING PROGRAM

## 2017-01-01 PROCEDURE — 2500000003 HC RX 250 WO HCPCS: Performed by: FAMILY MEDICINE

## 2017-01-01 PROCEDURE — 82306 VITAMIN D 25 HYDROXY: CPT

## 2017-01-01 PROCEDURE — 82378 CARCINOEMBRYONIC ANTIGEN: CPT

## 2017-01-01 PROCEDURE — 99233 SBSQ HOSP IP/OBS HIGH 50: CPT | Performed by: INTERNAL MEDICINE

## 2017-01-01 PROCEDURE — 87507 IADNA-DNA/RNA PROBE TQ 12-25: CPT

## 2017-01-01 PROCEDURE — 82105 ALPHA-FETOPROTEIN SERUM: CPT

## 2017-01-01 PROCEDURE — 85730 THROMBOPLASTIN TIME PARTIAL: CPT

## 2017-01-01 PROCEDURE — 87505 NFCT AGENT DETECTION GI: CPT

## 2017-01-01 PROCEDURE — 74000 XR ABDOMEN LIMITED (KUB): CPT

## 2017-01-01 PROCEDURE — 82805 BLOOD GASES W/O2 SATURATION: CPT

## 2017-01-01 PROCEDURE — 84484 ASSAY OF TROPONIN QUANT: CPT

## 2017-01-01 PROCEDURE — 85045 AUTOMATED RETICULOCYTE COUNT: CPT

## 2017-01-01 PROCEDURE — 82803 BLOOD GASES ANY COMBINATION: CPT

## 2017-01-01 PROCEDURE — A6209 FOAM DRSG <=16 SQ IN W/O BDR: HCPCS

## 2017-01-01 PROCEDURE — P9016 RBC LEUKOCYTES REDUCED: HCPCS

## 2017-01-01 PROCEDURE — 84134 ASSAY OF PREALBUMIN: CPT

## 2017-01-01 PROCEDURE — 6370000000 HC RX 637 (ALT 250 FOR IP): Performed by: PHARMACIST

## 2017-01-01 PROCEDURE — 97530 THERAPEUTIC ACTIVITIES: CPT

## 2017-01-01 PROCEDURE — 83690 ASSAY OF LIPASE: CPT

## 2017-01-01 PROCEDURE — 99223 1ST HOSP IP/OBS HIGH 75: CPT | Performed by: INTERNAL MEDICINE

## 2017-01-01 PROCEDURE — G8420 CALC BMI NORM PARAMETERS: HCPCS | Performed by: INTERNAL MEDICINE

## 2017-01-01 PROCEDURE — 71010 XR CHEST PORTABLE: CPT

## 2017-01-01 PROCEDURE — 36430 TRANSFUSION BLD/BLD COMPNT: CPT

## 2017-01-01 PROCEDURE — 80061 LIPID PANEL: CPT

## 2017-01-01 PROCEDURE — 87186 SC STD MICRODIL/AGAR DIL: CPT

## 2017-01-01 PROCEDURE — 82010 KETONE BODYS QUAN: CPT

## 2017-01-01 PROCEDURE — 80069 RENAL FUNCTION PANEL: CPT

## 2017-01-01 PROCEDURE — 83970 ASSAY OF PARATHORMONE: CPT

## 2017-01-01 PROCEDURE — 84300 ASSAY OF URINE SODIUM: CPT

## 2017-01-01 PROCEDURE — 83993 ASSAY FOR CALPROTECTIN FECAL: CPT

## 2017-01-01 PROCEDURE — 06HY33Z INSERTION OF INFUSION DEVICE INTO LOWER VEIN, PERCUTANEOUS APPROACH: ICD-10-PCS | Performed by: FAMILY MEDICINE

## 2017-01-01 PROCEDURE — 36556 INSERT NON-TUNNEL CV CATH: CPT

## 2017-01-01 PROCEDURE — G8979 MOBILITY GOAL STATUS: HCPCS

## 2017-01-01 PROCEDURE — 87147 CULTURE TYPE IMMUNOLOGIC: CPT

## 2017-01-01 PROCEDURE — 99211 OFF/OP EST MAY X REQ PHY/QHP: CPT

## 2017-01-01 PROCEDURE — 6370000000 HC RX 637 (ALT 250 FOR IP): Performed by: EMERGENCY MEDICINE

## 2017-01-01 PROCEDURE — 6360000002 HC RX W HCPCS: Performed by: NURSE PRACTITIONER

## 2017-01-01 PROCEDURE — 36592 COLLECT BLOOD FROM PICC: CPT

## 2017-01-01 PROCEDURE — G8987 SELF CARE CURRENT STATUS: HCPCS

## 2017-01-01 PROCEDURE — 97535 SELF CARE MNGMENT TRAINING: CPT

## 2017-01-01 PROCEDURE — 83883 ASSAY NEPHELOMETRY NOT SPEC: CPT

## 2017-01-01 PROCEDURE — 99285 EMERGENCY DEPT VISIT HI MDM: CPT

## 2017-01-01 PROCEDURE — 85049 AUTOMATED PLATELET COUNT: CPT

## 2017-01-01 PROCEDURE — B5131ZA FLUOROSCOPY OF RIGHT JUGULAR VEINS USING LOW OSMOLAR CONTRAST, GUIDANCE: ICD-10-PCS | Performed by: RADIOLOGY

## 2017-01-01 PROCEDURE — 97110 THERAPEUTIC EXERCISES: CPT

## 2017-01-01 PROCEDURE — A6258 TRANSPARENT FILM >16<=48 IN: HCPCS

## 2017-01-01 PROCEDURE — 99222 1ST HOSP IP/OBS MODERATE 55: CPT | Performed by: INTERNAL MEDICINE

## 2017-01-01 PROCEDURE — 76770 US EXAM ABDO BACK WALL COMP: CPT

## 2017-01-01 PROCEDURE — 86140 C-REACTIVE PROTEIN: CPT

## 2017-01-01 PROCEDURE — 2500000003 HC RX 250 WO HCPCS: Performed by: NURSE ANESTHETIST, CERTIFIED REGISTERED

## 2017-01-01 PROCEDURE — 77001 FLUOROGUIDE FOR VEIN DEVICE: CPT | Performed by: RADIOLOGY

## 2017-01-01 PROCEDURE — 99238 HOSP IP/OBS DSCHRG MGMT 30/<: CPT | Performed by: HOSPITALIST

## 2017-01-01 PROCEDURE — 05HM33Z INSERTION OF INFUSION DEVICE INTO RIGHT INTERNAL JUGULAR VEIN, PERCUTANEOUS APPROACH: ICD-10-PCS | Performed by: RADIOLOGY

## 2017-01-01 PROCEDURE — 93005 ELECTROCARDIOGRAM TRACING: CPT

## 2017-01-01 PROCEDURE — G8484 FLU IMMUNIZE NO ADMIN: HCPCS | Performed by: INTERNAL MEDICINE

## 2017-01-01 PROCEDURE — 83630 LACTOFERRIN FECAL (QUAL): CPT

## 2017-01-01 PROCEDURE — 6370000000 HC RX 637 (ALT 250 FOR IP)

## 2017-01-01 PROCEDURE — 86901 BLOOD TYPING SEROLOGIC RH(D): CPT

## 2017-01-01 PROCEDURE — 86850 RBC ANTIBODY SCREEN: CPT

## 2017-01-01 PROCEDURE — A6257 TRANSPARENT FILM <= 16 SQ IN: HCPCS

## 2017-01-01 PROCEDURE — 84155 ASSAY OF PROTEIN SERUM: CPT

## 2017-01-01 PROCEDURE — 81003 URINALYSIS AUTO W/O SCOPE: CPT

## 2017-01-01 PROCEDURE — 36591 DRAW BLOOD OFF VENOUS DEVICE: CPT

## 2017-01-01 PROCEDURE — G8978 MOBILITY CURRENT STATUS: HCPCS

## 2017-01-01 PROCEDURE — 74176 CT ABD & PELVIS W/O CONTRAST: CPT

## 2017-01-01 PROCEDURE — 1200000003 HC TELEMETRY R&B

## 2017-01-01 PROCEDURE — 86900 BLOOD TYPING SEROLOGIC ABO: CPT

## 2017-01-01 PROCEDURE — 2580000003 HC RX 258: Performed by: EMERGENCY MEDICINE

## 2017-01-01 PROCEDURE — 84550 ASSAY OF BLOOD/URIC ACID: CPT

## 2017-01-01 PROCEDURE — 4040F PNEUMOC VAC/ADMIN/RCVD: CPT | Performed by: INTERNAL MEDICINE

## 2017-01-01 PROCEDURE — 6360000002 HC RX W HCPCS: Performed by: PHARMACIST

## 2017-01-01 PROCEDURE — 2500000003 HC RX 250 WO HCPCS

## 2017-01-01 PROCEDURE — 2580000003 HC RX 258: Performed by: NURSE ANESTHETIST, CERTIFIED REGISTERED

## 2017-01-01 PROCEDURE — 3017F COLORECTAL CA SCREEN DOC REV: CPT | Performed by: INTERNAL MEDICINE

## 2017-01-01 PROCEDURE — C1769 GUIDE WIRE: HCPCS

## 2017-01-01 PROCEDURE — 99213 OFFICE O/P EST LOW 20 MIN: CPT | Performed by: FAMILY MEDICINE

## 2017-01-01 PROCEDURE — 82668 ASSAY OF ERYTHROPOIETIN: CPT

## 2017-01-01 PROCEDURE — 99214 OFFICE O/P EST MOD 30 MIN: CPT

## 2017-01-01 PROCEDURE — 86304 IMMUNOASSAY TUMOR CA 125: CPT

## 2017-01-01 PROCEDURE — 36589 REMOVAL TUNNELED CV CATH: CPT

## 2017-01-01 PROCEDURE — 83036 HEMOGLOBIN GLYCOSYLATED A1C: CPT

## 2017-01-01 PROCEDURE — C1751 CATH, INF, PER/CENT/MIDLINE: HCPCS

## 2017-01-01 PROCEDURE — 80076 HEPATIC FUNCTION PANEL: CPT

## 2017-01-01 PROCEDURE — 76937 US GUIDE VASCULAR ACCESS: CPT | Performed by: RADIOLOGY

## 2017-01-01 PROCEDURE — 82436 ASSAY OF URINE CHLORIDE: CPT

## 2017-01-01 PROCEDURE — 83010 ASSAY OF HAPTOGLOBIN QUANT: CPT

## 2017-01-01 PROCEDURE — 99222 1ST HOSP IP/OBS MODERATE 55: CPT | Performed by: FAMILY MEDICINE

## 2017-01-01 PROCEDURE — 99214 OFFICE O/P EST MOD 30 MIN: CPT | Performed by: FAMILY MEDICINE

## 2017-01-01 PROCEDURE — 84133 ASSAY OF URINE POTASSIUM: CPT

## 2017-01-01 PROCEDURE — 87081 CULTURE SCREEN ONLY: CPT

## 2017-01-01 PROCEDURE — C1894 INTRO/SHEATH, NON-LASER: HCPCS

## 2017-01-01 PROCEDURE — 96366 THER/PROPH/DIAG IV INF ADDON: CPT

## 2017-01-01 PROCEDURE — 86920 COMPATIBILITY TEST SPIN: CPT

## 2017-01-01 PROCEDURE — 2580000003 HC RX 258

## 2017-01-01 PROCEDURE — 93970 EXTREMITY STUDY: CPT

## 2017-01-01 PROCEDURE — 82746 ASSAY OF FOLIC ACID SERUM: CPT

## 2017-01-01 PROCEDURE — 2580000003 HC RX 258: Performed by: RADIOLOGY

## 2017-01-01 PROCEDURE — 06HM33Z INSERTION OF INFUSION DEVICE INTO RIGHT FEMORAL VEIN, PERCUTANEOUS APPROACH: ICD-10-PCS | Performed by: INTERNAL MEDICINE

## 2017-01-01 PROCEDURE — 84443 ASSAY THYROID STIM HORMONE: CPT

## 2017-01-01 PROCEDURE — 83540 ASSAY OF IRON: CPT

## 2017-01-01 PROCEDURE — 6360000002 HC RX W HCPCS: Performed by: RADIOLOGY

## 2017-01-01 PROCEDURE — 82800 BLOOD PH: CPT

## 2017-01-01 PROCEDURE — G8427 DOCREV CUR MEDS BY ELIG CLIN: HCPCS | Performed by: INTERNAL MEDICINE

## 2017-01-01 PROCEDURE — 36558 INSERT TUNNELED CV CATH: CPT | Performed by: RADIOLOGY

## 2017-01-01 PROCEDURE — 86160 COMPLEMENT ANTIGEN: CPT

## 2017-01-01 PROCEDURE — 94664 DEMO&/EVAL PT USE INHALER: CPT

## 2017-01-01 PROCEDURE — 99213 OFFICE O/P EST LOW 20 MIN: CPT | Performed by: INTERNAL MEDICINE

## 2017-01-01 PROCEDURE — 6360000002 HC RX W HCPCS

## 2017-01-01 PROCEDURE — 83880 ASSAY OF NATRIURETIC PEPTIDE: CPT

## 2017-01-01 PROCEDURE — 87184 SC STD DISK METHOD PER PLATE: CPT

## 2017-01-01 PROCEDURE — 6360000002 HC RX W HCPCS: Performed by: NURSE ANESTHETIST, CERTIFIED REGISTERED

## 2017-01-01 PROCEDURE — 76937 US GUIDE VASCULAR ACCESS: CPT

## 2017-01-01 PROCEDURE — 99220 PR INITIAL OBSERVATION CARE/DAY 70 MINUTES: CPT | Performed by: FAMILY MEDICINE

## 2017-01-01 PROCEDURE — 82044 UR ALBUMIN SEMIQUANTITATIVE: CPT

## 2017-01-01 PROCEDURE — 0DJ08ZZ INSPECTION OF UPPER INTESTINAL TRACT, VIA NATURAL OR ARTIFICIAL OPENING ENDOSCOPIC: ICD-10-PCS | Performed by: INTERNAL MEDICINE

## 2017-01-01 PROCEDURE — 1090F PRES/ABSN URINE INCON ASSESS: CPT | Performed by: INTERNAL MEDICINE

## 2017-01-01 PROCEDURE — 86923 COMPATIBILITY TEST ELECTRIC: CPT

## 2017-01-01 PROCEDURE — 86038 ANTINUCLEAR ANTIBODIES: CPT

## 2017-01-01 PROCEDURE — G0328 FECAL BLOOD SCRN IMMUNOASSAY: HCPCS

## 2017-01-01 PROCEDURE — 96374 THER/PROPH/DIAG INJ IV PUSH: CPT

## 2017-01-01 PROCEDURE — 6360000002 HC RX W HCPCS: Performed by: EMERGENCY MEDICINE

## 2017-01-01 PROCEDURE — 3609017100 HC EGD: Performed by: INTERNAL MEDICINE

## 2017-01-01 PROCEDURE — 99223 1ST HOSP IP/OBS HIGH 75: CPT | Performed by: FAMILY MEDICINE

## 2017-01-01 PROCEDURE — 82607 VITAMIN B-12: CPT

## 2017-01-01 PROCEDURE — 82728 ASSAY OF FERRITIN: CPT

## 2017-01-01 PROCEDURE — 84165 PROTEIN E-PHORESIS SERUM: CPT

## 2017-01-01 PROCEDURE — 82150 ASSAY OF AMYLASE: CPT

## 2017-01-01 PROCEDURE — 83615 LACTATE (LD) (LDH) ENZYME: CPT

## 2017-01-01 PROCEDURE — 82570 ASSAY OF URINE CREATININE: CPT

## 2017-01-01 PROCEDURE — 1036F TOBACCO NON-USER: CPT | Performed by: INTERNAL MEDICINE

## 2017-01-01 RX ORDER — SODIUM CHLORIDE 450 MG/100ML
INJECTION, SOLUTION INTRAVENOUS CONTINUOUS
Status: DISCONTINUED | OUTPATIENT
Start: 2017-01-01 | End: 2017-01-01 | Stop reason: HOSPADM

## 2017-01-01 RX ORDER — ERGOCALCIFEROL (VITAMIN D2) 1250 MCG
50000 CAPSULE ORAL WEEKLY
Status: DISCONTINUED | OUTPATIENT
Start: 2017-01-01 | End: 2018-01-01 | Stop reason: HOSPADM

## 2017-01-01 RX ORDER — SODIUM CHLORIDE 0.9 % (FLUSH) 0.9 %
10 SYRINGE (ML) INJECTION PRN
Status: DISCONTINUED | OUTPATIENT
Start: 2017-01-01 | End: 2017-01-01 | Stop reason: HOSPADM

## 2017-01-01 RX ORDER — OMEPRAZOLE 40 MG/1
40 CAPSULE, DELAYED RELEASE ORAL DAILY
Qty: 30 CAPSULE | Refills: 3 | Status: SHIPPED | OUTPATIENT
Start: 2017-01-01 | End: 2017-01-01 | Stop reason: SDUPTHER

## 2017-01-01 RX ORDER — POTASSIUM CHLORIDE 20 MEQ/1
20 TABLET, EXTENDED RELEASE ORAL DAILY
Qty: 30 TABLET | Refills: 0 | Status: SHIPPED | OUTPATIENT
Start: 2017-01-01 | End: 2017-01-01 | Stop reason: SDUPTHER

## 2017-01-01 RX ORDER — DEXTROSE MONOHYDRATE 50 MG/ML
100 INJECTION, SOLUTION INTRAVENOUS PRN
Status: DISCONTINUED | OUTPATIENT
Start: 2017-01-01 | End: 2018-01-01 | Stop reason: HOSPADM

## 2017-01-01 RX ORDER — LACTOBACILLUS RHAMNOSUS GG 10B CELL
1 CAPSULE ORAL 3 TIMES DAILY
Status: DISCONTINUED | OUTPATIENT
Start: 2017-01-01 | End: 2018-01-01 | Stop reason: HOSPADM

## 2017-01-01 RX ORDER — PRAVASTATIN SODIUM 20 MG
20 TABLET ORAL EVERY EVENING
Qty: 30 TABLET | Refills: 5 | Status: ON HOLD | OUTPATIENT
Start: 2017-01-01 | End: 2018-01-01 | Stop reason: HOSPADM

## 2017-01-01 RX ORDER — FLUTICASONE FUROATE AND VILANTEROL 100; 25 UG/1; UG/1
2 POWDER RESPIRATORY (INHALATION) DAILY
COMMUNITY
End: 2017-01-01 | Stop reason: CLARIF

## 2017-01-01 RX ORDER — LIDOCAINE HYDROCHLORIDE 10 MG/ML
INJECTION, SOLUTION EPIDURAL; INFILTRATION; INTRACAUDAL; PERINEURAL PRN
Status: DISCONTINUED | OUTPATIENT
Start: 2017-01-01 | End: 2017-01-01 | Stop reason: SDUPTHER

## 2017-01-01 RX ORDER — POTASSIUM CHLORIDE 20MEQ/15ML
40 LIQUID (ML) ORAL PRN
Status: DISCONTINUED | OUTPATIENT
Start: 2017-01-01 | End: 2017-01-01 | Stop reason: HOSPADM

## 2017-01-01 RX ORDER — OXYCODONE HYDROCHLORIDE AND ACETAMINOPHEN 5; 325 MG/1; MG/1
1 TABLET ORAL EVERY 6 HOURS PRN
Status: DISCONTINUED | OUTPATIENT
Start: 2017-01-01 | End: 2017-01-01

## 2017-01-01 RX ORDER — HEPARIN SODIUM 5000 [USP'U]/ML
5000 INJECTION, SOLUTION INTRAVENOUS; SUBCUTANEOUS EVERY 8 HOURS SCHEDULED
Status: DISCONTINUED | OUTPATIENT
Start: 2017-01-01 | End: 2018-01-01

## 2017-01-01 RX ORDER — LACTOBACILLUS RHAMNOSUS GG 10B CELL
1 CAPSULE ORAL 3 TIMES DAILY
Status: DISCONTINUED | OUTPATIENT
Start: 2017-01-01 | End: 2017-01-01 | Stop reason: HOSPADM

## 2017-01-01 RX ORDER — COLCHICINE 0.6 MG/1
0.6 TABLET ORAL 3 TIMES DAILY
Status: COMPLETED | OUTPATIENT
Start: 2017-01-01 | End: 2017-01-01

## 2017-01-01 RX ORDER — POTASSIUM CHLORIDE 20 MEQ/1
40 TABLET, EXTENDED RELEASE ORAL
Status: DISCONTINUED | OUTPATIENT
Start: 2017-01-01 | End: 2018-01-01

## 2017-01-01 RX ORDER — SERTRALINE HYDROCHLORIDE 100 MG/1
TABLET, FILM COATED ORAL
COMMUNITY
Start: 2017-01-01 | End: 2017-01-01 | Stop reason: CLARIF

## 2017-01-01 RX ORDER — POTASSIUM CHLORIDE 20 MEQ/1
20 TABLET, EXTENDED RELEASE ORAL DAILY
Qty: 30 TABLET | Refills: 3 | Status: ON HOLD | OUTPATIENT
Start: 2017-01-01 | End: 2017-01-01 | Stop reason: DRUGHIGH

## 2017-01-01 RX ORDER — PHENYTOIN SODIUM 100 MG/1
100 CAPSULE, EXTENDED RELEASE ORAL 3 TIMES DAILY
COMMUNITY

## 2017-01-01 RX ORDER — MIDODRINE HYDROCHLORIDE 10 MG/1
10 TABLET ORAL ONCE
Status: COMPLETED | OUTPATIENT
Start: 2017-01-01 | End: 2017-01-01

## 2017-01-01 RX ORDER — GREEN TEA/HOODIA GORDONII 315-12.5MG
1 CAPSULE ORAL 3 TIMES DAILY
Qty: 90 TABLET | Refills: 3 | Status: SHIPPED | OUTPATIENT
Start: 2017-01-01 | End: 2017-01-01 | Stop reason: SDUPTHER

## 2017-01-01 RX ORDER — OXYCODONE HYDROCHLORIDE AND ACETAMINOPHEN 5; 325 MG/1; MG/1
2 TABLET ORAL EVERY 8 HOURS PRN
Qty: 10 TABLET | Refills: 0 | Status: SHIPPED | OUTPATIENT
Start: 2017-01-01 | End: 2017-01-01 | Stop reason: ALTCHOICE

## 2017-01-01 RX ORDER — SERTRALINE HYDROCHLORIDE 100 MG/1
100 TABLET, FILM COATED ORAL DAILY
Qty: 30 TABLET | Refills: 3 | Status: SHIPPED | OUTPATIENT
Start: 2017-01-01 | End: 2017-01-01 | Stop reason: SDUPTHER

## 2017-01-01 RX ORDER — SODIUM CHLORIDE 0.9 % (FLUSH) 0.9 %
10 SYRINGE (ML) INJECTION PRN
Status: DISCONTINUED | OUTPATIENT
Start: 2017-01-01 | End: 2018-01-01 | Stop reason: HOSPADM

## 2017-01-01 RX ORDER — GLIMEPIRIDE 2 MG/1
2 TABLET ORAL EVERY MORNING
Qty: 30 TABLET | Refills: 3 | Status: SHIPPED | OUTPATIENT
Start: 2017-01-01 | End: 2017-01-01

## 2017-01-01 RX ORDER — LANOLIN ALCOHOL/MO/W.PET/CERES
325 CREAM (GRAM) TOPICAL 2 TIMES DAILY WITH MEALS
Status: DISCONTINUED | OUTPATIENT
Start: 2017-01-01 | End: 2017-01-01 | Stop reason: HOSPADM

## 2017-01-01 RX ORDER — ALBUTEROL SULFATE 2.5 MG/3ML
2.5 SOLUTION RESPIRATORY (INHALATION) EVERY 6 HOURS PRN
Status: DISCONTINUED | OUTPATIENT
Start: 2017-01-01 | End: 2017-01-01 | Stop reason: HOSPADM

## 2017-01-01 RX ORDER — ACETAMINOPHEN 325 MG/1
650 TABLET ORAL EVERY 4 HOURS PRN
Status: DISCONTINUED | OUTPATIENT
Start: 2017-01-01 | End: 2017-01-01 | Stop reason: HOSPADM

## 2017-01-01 RX ORDER — CIPROFLOXACIN 250 MG/1
250 TABLET, FILM COATED ORAL
Status: DISCONTINUED | OUTPATIENT
Start: 2017-01-01 | End: 2017-01-01 | Stop reason: ALTCHOICE

## 2017-01-01 RX ORDER — OXYCODONE HYDROCHLORIDE AND ACETAMINOPHEN 5; 325 MG/1; MG/1
1 TABLET ORAL EVERY 8 HOURS PRN
Qty: 10 TABLET | Refills: 0 | Status: ON HOLD | OUTPATIENT
Start: 2017-01-01 | End: 2018-01-01

## 2017-01-01 RX ORDER — PRAVASTATIN SODIUM 20 MG
20 TABLET ORAL EVERY EVENING
Status: DISCONTINUED | OUTPATIENT
Start: 2017-01-01 | End: 2017-01-01 | Stop reason: HOSPADM

## 2017-01-01 RX ORDER — LOPERAMIDE HYDROCHLORIDE 2 MG/1
2 CAPSULE ORAL
Status: DISCONTINUED | OUTPATIENT
Start: 2017-01-01 | End: 2017-01-01 | Stop reason: HOSPADM

## 2017-01-01 RX ORDER — COLCHICINE 0.6 MG/1
0.6 CAPSULE ORAL DAILY
Status: DISCONTINUED | OUTPATIENT
Start: 2017-01-01 | End: 2017-01-01 | Stop reason: HOSPADM

## 2017-01-01 RX ORDER — ERGOCALCIFEROL 1.25 MG/1
50000 CAPSULE ORAL WEEKLY
Status: DISCONTINUED | OUTPATIENT
Start: 2017-01-01 | End: 2017-01-01 | Stop reason: HOSPADM

## 2017-01-01 RX ORDER — POTASSIUM CHLORIDE 20MEQ/15ML
40 LIQUID (ML) ORAL 2 TIMES DAILY
Status: DISCONTINUED | OUTPATIENT
Start: 2017-01-01 | End: 2017-01-01

## 2017-01-01 RX ORDER — MAGNESIUM SULFATE IN WATER 40 MG/ML
2 INJECTION, SOLUTION INTRAVENOUS ONCE
Status: DISCONTINUED | OUTPATIENT
Start: 2017-01-01 | End: 2017-01-01

## 2017-01-01 RX ORDER — SODIUM CHLORIDE 9 MG/ML
INJECTION, SOLUTION INTRAVENOUS CONTINUOUS
Status: ACTIVE | OUTPATIENT
Start: 2017-01-01 | End: 2017-01-01

## 2017-01-01 RX ORDER — POTASSIUM CHLORIDE 7.45 MG/ML
10 INJECTION INTRAVENOUS PRN
Status: DISCONTINUED | OUTPATIENT
Start: 2017-01-01 | End: 2017-01-01 | Stop reason: HOSPADM

## 2017-01-01 RX ORDER — SODIUM CHLORIDE 9 MG/ML
INJECTION, SOLUTION INTRAVENOUS CONTINUOUS PRN
Status: DISCONTINUED | OUTPATIENT
Start: 2017-01-01 | End: 2017-01-01 | Stop reason: SDUPTHER

## 2017-01-01 RX ORDER — POTASSIUM CHLORIDE 20 MEQ/1
20 TABLET, EXTENDED RELEASE ORAL 2 TIMES DAILY
Status: ON HOLD | COMMUNITY
End: 2017-01-01

## 2017-01-01 RX ORDER — SODIUM CHLORIDE 0.9 % (FLUSH) 0.9 %
10 SYRINGE (ML) INJECTION EVERY 12 HOURS
Status: DISCONTINUED | OUTPATIENT
Start: 2017-01-01 | End: 2017-01-01 | Stop reason: HOSPADM

## 2017-01-01 RX ORDER — POTASSIUM CHLORIDE 20 MEQ/1
20 TABLET, EXTENDED RELEASE ORAL
Status: DISCONTINUED | OUTPATIENT
Start: 2017-01-01 | End: 2017-01-01 | Stop reason: HOSPADM

## 2017-01-01 RX ORDER — CIPROFLOXACIN 2 MG/ML
400 INJECTION, SOLUTION INTRAVENOUS
Status: DISCONTINUED | OUTPATIENT
Start: 2017-01-01 | End: 2017-01-01 | Stop reason: ALTCHOICE

## 2017-01-01 RX ORDER — ONDANSETRON 4 MG/1
4 TABLET, FILM COATED ORAL EVERY 12 HOURS PRN
Status: DISCONTINUED | OUTPATIENT
Start: 2017-01-01 | End: 2017-01-01 | Stop reason: HOSPADM

## 2017-01-01 RX ORDER — MEGESTROL ACETATE 40 MG/ML
40 SUSPENSION ORAL DAILY
Status: ON HOLD | COMMUNITY
End: 2018-01-01 | Stop reason: HOSPADM

## 2017-01-01 RX ORDER — MIRTAZAPINE 7.5 MG/1
7.5 TABLET, FILM COATED ORAL NIGHTLY
COMMUNITY
End: 2017-01-01 | Stop reason: ALTCHOICE

## 2017-01-01 RX ORDER — MIDODRINE HYDROCHLORIDE 5 MG/1
5 TABLET ORAL
Status: DISCONTINUED | OUTPATIENT
Start: 2017-01-01 | End: 2017-01-01

## 2017-01-01 RX ORDER — MAGNESIUM SULFATE IN WATER 40 MG/ML
2 INJECTION, SOLUTION INTRAVENOUS ONCE
Status: COMPLETED | OUTPATIENT
Start: 2017-01-01 | End: 2017-01-01

## 2017-01-01 RX ORDER — MORPHINE SULFATE 2 MG/ML
2 INJECTION, SOLUTION INTRAMUSCULAR; INTRAVENOUS
Status: DISCONTINUED | OUTPATIENT
Start: 2017-01-01 | End: 2017-01-01 | Stop reason: HOSPADM

## 2017-01-01 RX ORDER — CIPROFLOXACIN 500 MG/1
500 TABLET, FILM COATED ORAL EVERY 12 HOURS SCHEDULED
Status: DISCONTINUED | OUTPATIENT
Start: 2017-01-01 | End: 2017-01-01 | Stop reason: HOSPADM

## 2017-01-01 RX ORDER — DEXTROSE MONOHYDRATE 25 G/50ML
12.5 INJECTION, SOLUTION INTRAVENOUS PRN
Status: DISCONTINUED | OUTPATIENT
Start: 2017-01-01 | End: 2018-01-01 | Stop reason: HOSPADM

## 2017-01-01 RX ORDER — SODIUM CHLORIDE 0.9 % (FLUSH) 0.9 %
10 SYRINGE (ML) INJECTION EVERY 12 HOURS SCHEDULED
Status: DISCONTINUED | OUTPATIENT
Start: 2017-01-01 | End: 2017-01-01 | Stop reason: HOSPADM

## 2017-01-01 RX ORDER — MEGESTROL ACETATE 40 MG/ML
400 SUSPENSION ORAL DAILY
Status: DISCONTINUED | OUTPATIENT
Start: 2017-01-01 | End: 2017-01-01 | Stop reason: HOSPADM

## 2017-01-01 RX ORDER — LOPERAMIDE HYDROCHLORIDE 2 MG/1
2 CAPSULE ORAL 3 TIMES DAILY
Status: DISCONTINUED | OUTPATIENT
Start: 2017-01-01 | End: 2018-01-01 | Stop reason: HOSPADM

## 2017-01-01 RX ORDER — MORPHINE SULFATE 4 MG/ML
4 INJECTION, SOLUTION INTRAMUSCULAR; INTRAVENOUS
Status: DISCONTINUED | OUTPATIENT
Start: 2017-01-01 | End: 2017-01-01 | Stop reason: HOSPADM

## 2017-01-01 RX ORDER — LOPERAMIDE HYDROCHLORIDE 2 MG/1
2 CAPSULE ORAL 3 TIMES DAILY PRN
Status: DISCONTINUED | OUTPATIENT
Start: 2017-01-01 | End: 2017-01-01

## 2017-01-01 RX ORDER — FERROUS SULFATE 325(65) MG
325 TABLET ORAL
COMMUNITY
End: 2017-01-01 | Stop reason: SDUPTHER

## 2017-01-01 RX ORDER — CHOLESTYRAMINE LIGHT 4 G/5.7G
2 POWDER, FOR SUSPENSION ORAL DAILY
Status: DISCONTINUED | OUTPATIENT
Start: 2017-01-01 | End: 2017-01-01 | Stop reason: HOSPADM

## 2017-01-01 RX ORDER — ALBUTEROL SULFATE 2.5 MG/3ML
2.5 SOLUTION RESPIRATORY (INHALATION) EVERY 6 HOURS PRN
Qty: 120 EACH | Refills: 3 | Status: SHIPPED | OUTPATIENT
Start: 2017-01-01 | End: 2017-01-01 | Stop reason: CLARIF

## 2017-01-01 RX ORDER — OMEPRAZOLE 40 MG/1
40 CAPSULE, DELAYED RELEASE ORAL DAILY
Qty: 30 CAPSULE | Refills: 5 | Status: ON HOLD | OUTPATIENT
Start: 2017-01-01 | End: 2018-01-01 | Stop reason: HOSPADM

## 2017-01-01 RX ORDER — ALBUTEROL SULFATE 2.5 MG/3ML
2.5 SOLUTION RESPIRATORY (INHALATION) EVERY 6 HOURS PRN
Qty: 120 EACH | Refills: 3 | Status: SHIPPED | OUTPATIENT
Start: 2017-01-01 | End: 2017-01-01 | Stop reason: SDUPTHER

## 2017-01-01 RX ORDER — FERROUS SULFATE 325(65) MG
325 TABLET ORAL
Qty: 90 TABLET | Refills: 3 | Status: SHIPPED | OUTPATIENT
Start: 2017-01-01 | End: 2017-01-01 | Stop reason: SDUPTHER

## 2017-01-01 RX ORDER — MIDODRINE HYDROCHLORIDE 10 MG/1
10 TABLET ORAL
Status: DISCONTINUED | OUTPATIENT
Start: 2017-01-01 | End: 2017-01-01

## 2017-01-01 RX ORDER — LOPERAMIDE HYDROCHLORIDE 2 MG/1
2 TABLET ORAL
COMMUNITY

## 2017-01-01 RX ORDER — CEFUROXIME AXETIL 250 MG/1
250 TABLET ORAL DAILY
Status: COMPLETED | OUTPATIENT
Start: 2017-01-01 | End: 2017-01-01

## 2017-01-01 RX ORDER — ASPIRIN 81 MG/1
81 TABLET ORAL DAILY
Status: DISCONTINUED | OUTPATIENT
Start: 2017-01-01 | End: 2017-01-01 | Stop reason: HOSPADM

## 2017-01-01 RX ORDER — CALCIUM POLYCARBOPHIL 625 MG 625 MG/1
625 TABLET ORAL 2 TIMES DAILY
Status: DISCONTINUED | OUTPATIENT
Start: 2017-01-01 | End: 2017-01-01 | Stop reason: HOSPADM

## 2017-01-01 RX ORDER — PANTOPRAZOLE SODIUM 40 MG/1
40 TABLET, DELAYED RELEASE ORAL
Status: DISCONTINUED | OUTPATIENT
Start: 2017-01-01 | End: 2017-01-01 | Stop reason: HOSPADM

## 2017-01-01 RX ORDER — POTASSIUM CHLORIDE 20 MEQ/1
40 TABLET, EXTENDED RELEASE ORAL ONCE
Status: COMPLETED | OUTPATIENT
Start: 2017-01-01 | End: 2017-01-01

## 2017-01-01 RX ORDER — HYDROCODONE BITARTRATE AND ACETAMINOPHEN 5; 325 MG/1; MG/1
1 TABLET ORAL EVERY 6 HOURS PRN
Status: DISCONTINUED | OUTPATIENT
Start: 2017-01-01 | End: 2017-01-01 | Stop reason: HOSPADM

## 2017-01-01 RX ORDER — OXYCODONE HYDROCHLORIDE AND ACETAMINOPHEN 5; 325 MG/1; MG/1
1 TABLET ORAL EVERY 8 HOURS PRN
Status: DISCONTINUED | OUTPATIENT
Start: 2017-01-01 | End: 2018-01-01 | Stop reason: HOSPADM

## 2017-01-01 RX ORDER — POTASSIUM CHLORIDE 20MEQ/15ML
40 LIQUID (ML) ORAL ONCE
Status: DISCONTINUED | OUTPATIENT
Start: 2017-01-01 | End: 2017-01-01 | Stop reason: HOSPADM

## 2017-01-01 RX ORDER — DEXTROSE, SODIUM CHLORIDE, AND POTASSIUM CHLORIDE 5; .45; .15 G/100ML; G/100ML; G/100ML
INJECTION INTRAVENOUS CONTINUOUS
Status: DISCONTINUED | OUTPATIENT
Start: 2017-01-01 | End: 2017-01-01 | Stop reason: ALTCHOICE

## 2017-01-01 RX ORDER — ALBUTEROL SULFATE 2.5 MG/3ML
0.83 SOLUTION RESPIRATORY (INHALATION) EVERY 6 HOURS PRN
Status: DISCONTINUED | OUTPATIENT
Start: 2017-01-01 | End: 2018-01-01 | Stop reason: HOSPADM

## 2017-01-01 RX ORDER — POTASSIUM CHLORIDE 20 MEQ/1
20 TABLET, EXTENDED RELEASE ORAL DAILY
Qty: 30 TABLET | Refills: 3 | Status: SHIPPED | OUTPATIENT
Start: 2017-01-01 | End: 2017-01-01 | Stop reason: SDUPTHER

## 2017-01-01 RX ORDER — OXYCODONE HYDROCHLORIDE AND ACETAMINOPHEN 5; 325 MG/1; MG/1
1 TABLET ORAL EVERY 6 HOURS PRN
Status: DISCONTINUED | OUTPATIENT
Start: 2017-01-01 | End: 2017-01-01 | Stop reason: HOSPADM

## 2017-01-01 RX ORDER — COLCHICINE 0.6 MG/1
0.6 TABLET ORAL DAILY
Status: DISCONTINUED | OUTPATIENT
Start: 2017-01-01 | End: 2017-01-01 | Stop reason: HOSPADM

## 2017-01-01 RX ORDER — POTASSIUM CHLORIDE 20 MEQ/1
20 TABLET, EXTENDED RELEASE ORAL
Qty: 60 TABLET | Refills: 3 | Status: ON HOLD | DISCHARGE
Start: 2017-01-01 | End: 2018-01-01 | Stop reason: HOSPADM

## 2017-01-01 RX ORDER — MEGESTROL ACETATE 40 MG/ML
40 SUSPENSION ORAL DAILY
Status: DISCONTINUED | OUTPATIENT
Start: 2017-01-01 | End: 2018-01-01

## 2017-01-01 RX ORDER — CALCIUM GLUCONATE 94 MG/ML
1 INJECTION, SOLUTION INTRAVENOUS ONCE
Status: COMPLETED | OUTPATIENT
Start: 2017-01-01 | End: 2017-01-01

## 2017-01-01 RX ORDER — ERGOCALCIFEROL (VITAMIN D2) 1250 MCG
50000 CAPSULE ORAL WEEKLY
Qty: 12 CAPSULE | Refills: 0 | Status: SHIPPED | OUTPATIENT
Start: 2017-01-01 | End: 2017-01-01 | Stop reason: SDUPTHER

## 2017-01-01 RX ORDER — LANOLIN ALCOHOL/MO/W.PET/CERES
325 CREAM (GRAM) TOPICAL
Status: DISCONTINUED | OUTPATIENT
Start: 2017-01-01 | End: 2017-01-01 | Stop reason: HOSPADM

## 2017-01-01 RX ORDER — LOPERAMIDE HYDROCHLORIDE 2 MG/1
2 CAPSULE ORAL 3 TIMES DAILY PRN
Status: DISCONTINUED | OUTPATIENT
Start: 2017-01-01 | End: 2017-01-01 | Stop reason: HOSPADM

## 2017-01-01 RX ORDER — MAGNESIUM SULFATE IN WATER 40 MG/ML
4 INJECTION, SOLUTION INTRAVENOUS ONCE
Status: COMPLETED | OUTPATIENT
Start: 2017-01-01 | End: 2017-01-01

## 2017-01-01 RX ORDER — FOLIC ACID 1 MG/1
1 TABLET ORAL DAILY
Status: DISCONTINUED | OUTPATIENT
Start: 2017-01-01 | End: 2017-01-01 | Stop reason: HOSPADM

## 2017-01-01 RX ORDER — CIPROFLOXACIN 2 MG/ML
200 INJECTION, SOLUTION INTRAVENOUS EVERY 24 HOURS
Status: DISCONTINUED | OUTPATIENT
Start: 2017-01-01 | End: 2017-01-01 | Stop reason: HOSPADM

## 2017-01-01 RX ORDER — FERROUS SULFATE 325(65) MG
325 TABLET ORAL
Status: DISCONTINUED | OUTPATIENT
Start: 2017-01-01 | End: 2018-01-01

## 2017-01-01 RX ORDER — PHENYTOIN SODIUM 100 MG/1
100 CAPSULE, EXTENDED RELEASE ORAL 3 TIMES DAILY
Status: DISCONTINUED | OUTPATIENT
Start: 2017-01-01 | End: 2018-01-01 | Stop reason: HOSPADM

## 2017-01-01 RX ORDER — OXYCODONE HYDROCHLORIDE AND ACETAMINOPHEN 5; 325 MG/1; MG/1
1 TABLET ORAL EVERY 8 HOURS PRN
Status: DISCONTINUED | OUTPATIENT
Start: 2017-01-01 | End: 2017-01-01 | Stop reason: HOSPADM

## 2017-01-01 RX ORDER — DEXTROSE MONOHYDRATE 25 G/50ML
12.5 INJECTION, SOLUTION INTRAVENOUS PRN
Status: DISCONTINUED | OUTPATIENT
Start: 2017-01-01 | End: 2017-01-01 | Stop reason: HOSPADM

## 2017-01-01 RX ORDER — ONDANSETRON 4 MG/1
4 TABLET, FILM COATED ORAL EVERY 8 HOURS PRN
Status: DISCONTINUED | OUTPATIENT
Start: 2017-01-01 | End: 2018-01-01 | Stop reason: HOSPADM

## 2017-01-01 RX ORDER — ALBUTEROL SULFATE 2.5 MG/3ML
0.83 SOLUTION RESPIRATORY (INHALATION) EVERY 6 HOURS PRN
COMMUNITY

## 2017-01-01 RX ORDER — PANTOPRAZOLE SODIUM 40 MG/1
40 TABLET, DELAYED RELEASE ORAL
Status: DISCONTINUED | OUTPATIENT
Start: 2017-01-01 | End: 2017-01-01

## 2017-01-01 RX ORDER — SODIUM CHLORIDE 9 MG/ML
INJECTION, SOLUTION INTRAVENOUS CONTINUOUS
Status: DISCONTINUED | OUTPATIENT
Start: 2017-01-01 | End: 2017-01-01

## 2017-01-01 RX ORDER — 0.9 % SODIUM CHLORIDE 0.9 %
250 INTRAVENOUS SOLUTION INTRAVENOUS ONCE
Status: COMPLETED | OUTPATIENT
Start: 2017-01-01 | End: 2017-01-01

## 2017-01-01 RX ORDER — FOLIC ACID 1 MG/1
1 TABLET ORAL DAILY
Status: DISCONTINUED | OUTPATIENT
Start: 2017-01-01 | End: 2018-01-01 | Stop reason: HOSPADM

## 2017-01-01 RX ORDER — HEPARIN SODIUM 5000 [USP'U]/ML
5000 INJECTION, SOLUTION INTRAVENOUS; SUBCUTANEOUS ONCE
Status: DISCONTINUED | OUTPATIENT
Start: 2017-01-01 | End: 2017-01-01 | Stop reason: HOSPADM

## 2017-01-01 RX ORDER — PROPOFOL 10 MG/ML
INJECTION, EMULSION INTRAVENOUS PRN
Status: DISCONTINUED | OUTPATIENT
Start: 2017-01-01 | End: 2017-01-01 | Stop reason: SDUPTHER

## 2017-01-01 RX ORDER — SODIUM CHLORIDE 9 MG/ML
INJECTION, SOLUTION INTRAVENOUS CONTINUOUS
Status: DISCONTINUED | OUTPATIENT
Start: 2017-01-01 | End: 2018-01-01

## 2017-01-01 RX ORDER — 0.9 % SODIUM CHLORIDE 0.9 %
1000 INTRAVENOUS SOLUTION INTRAVENOUS ONCE
Status: COMPLETED | OUTPATIENT
Start: 2017-01-01 | End: 2017-01-01

## 2017-01-01 RX ORDER — CIPROFLOXACIN 2 MG/ML
400 INJECTION, SOLUTION INTRAVENOUS EVERY 12 HOURS
Status: DISCONTINUED | OUTPATIENT
Start: 2017-01-01 | End: 2017-01-01

## 2017-01-01 RX ORDER — ONDANSETRON 4 MG/1
4 TABLET, FILM COATED ORAL EVERY 8 HOURS PRN
COMMUNITY

## 2017-01-01 RX ORDER — DEXTROSE MONOHYDRATE 25 G/50ML
25 INJECTION, SOLUTION INTRAVENOUS ONCE
Status: COMPLETED | OUTPATIENT
Start: 2017-01-01 | End: 2017-01-01

## 2017-01-01 RX ORDER — SODIUM CHLORIDE 0.9 % (FLUSH) 0.9 %
10 SYRINGE (ML) INJECTION EVERY 12 HOURS SCHEDULED
Status: DISCONTINUED | OUTPATIENT
Start: 2017-01-01 | End: 2018-01-01 | Stop reason: HOSPADM

## 2017-01-01 RX ORDER — POTASSIUM CHLORIDE 1.5 G/1.77G
20 POWDER, FOR SOLUTION ORAL 2 TIMES DAILY
Status: ON HOLD | COMMUNITY
End: 2017-01-01 | Stop reason: HOSPADM

## 2017-01-01 RX ORDER — MAGNESIUM SULFATE 1 G/100ML
1 INJECTION INTRAVENOUS PRN
Status: DISCONTINUED | OUTPATIENT
Start: 2017-01-01 | End: 2017-01-01 | Stop reason: HOSPADM

## 2017-01-01 RX ORDER — CALCIUM CARBONATE 500(1250)
500 TABLET ORAL 2 TIMES DAILY
Status: DISCONTINUED | OUTPATIENT
Start: 2017-01-01 | End: 2017-01-01 | Stop reason: HOSPADM

## 2017-01-01 RX ORDER — MAGNESIUM SULFATE 1 G/100ML
1 INJECTION INTRAVENOUS ONCE
Status: COMPLETED | OUTPATIENT
Start: 2017-01-01 | End: 2017-01-01

## 2017-01-01 RX ORDER — CLINDAMYCIN PHOSPHATE 600 MG/50ML
600 INJECTION INTRAVENOUS
Status: COMPLETED | OUTPATIENT
Start: 2017-01-01 | End: 2017-01-01

## 2017-01-01 RX ORDER — FERROUS SULFATE 325(65) MG
325 TABLET ORAL
Status: DISCONTINUED | OUTPATIENT
Start: 2017-01-01 | End: 2017-01-01 | Stop reason: HOSPADM

## 2017-01-01 RX ORDER — MEGESTROL ACETATE 40 MG/ML
40 SUSPENSION ORAL DAILY
Status: DISCONTINUED | OUTPATIENT
Start: 2017-01-01 | End: 2017-01-01 | Stop reason: HOSPADM

## 2017-01-01 RX ORDER — DEXTROSE MONOHYDRATE 25 G/50ML
INJECTION, SOLUTION INTRAVENOUS
Status: DISPENSED
Start: 2017-01-01 | End: 2017-01-01

## 2017-01-01 RX ORDER — DIPHENHYDRAMINE HYDROCHLORIDE 50 MG/ML
25 INJECTION INTRAMUSCULAR; INTRAVENOUS NIGHTLY PRN
Status: DISCONTINUED | OUTPATIENT
Start: 2017-01-01 | End: 2017-01-01 | Stop reason: HOSPADM

## 2017-01-01 RX ORDER — ESOMEPRAZOLE MAGNESIUM 40 MG/1
40 FOR SUSPENSION ORAL DAILY
Qty: 30 PACKET | Refills: 3 | Status: SHIPPED | OUTPATIENT
Start: 2017-01-01 | End: 2017-01-01 | Stop reason: HOSPADM

## 2017-01-01 RX ORDER — DIPHENOXYLATE HYDROCHLORIDE AND ATROPINE SULFATE 2.5; .025 MG/1; MG/1
1 TABLET ORAL 4 TIMES DAILY PRN
Status: DISCONTINUED | OUTPATIENT
Start: 2017-01-01 | End: 2017-01-01 | Stop reason: HOSPADM

## 2017-01-01 RX ORDER — CIPROFLOXACIN 500 MG/1
500 TABLET, FILM COATED ORAL EVERY 12 HOURS SCHEDULED
Qty: 6 TABLET | Refills: 0 | Status: SHIPPED | OUTPATIENT
Start: 2017-01-01 | End: 2017-01-01

## 2017-01-01 RX ORDER — BUDESONIDE AND FORMOTEROL FUMARATE DIHYDRATE 160; 4.5 UG/1; UG/1
2 AEROSOL RESPIRATORY (INHALATION) 2 TIMES DAILY
Qty: 1 INHALER | Refills: 3 | Status: ON HOLD | OUTPATIENT
Start: 2017-01-01 | End: 2017-01-01 | Stop reason: SDUPTHER

## 2017-01-01 RX ORDER — MAGNESIUM OXIDE 400 MG/1
800 TABLET ORAL 2 TIMES DAILY
COMMUNITY
End: 2017-01-01 | Stop reason: ALTCHOICE

## 2017-01-01 RX ORDER — SODIUM CHLORIDE 450 MG/100ML
INJECTION, SOLUTION INTRAVENOUS CONTINUOUS
Status: DISCONTINUED | OUTPATIENT
Start: 2017-01-01 | End: 2017-01-01

## 2017-01-01 RX ORDER — POTASSIUM CHLORIDE 20 MEQ/1
40 TABLET, EXTENDED RELEASE ORAL PRN
Status: DISCONTINUED | OUTPATIENT
Start: 2017-01-01 | End: 2017-01-01 | Stop reason: HOSPADM

## 2017-01-01 RX ORDER — PRAVASTATIN SODIUM 20 MG
20 TABLET ORAL DAILY
Status: DISCONTINUED | OUTPATIENT
Start: 2017-01-01 | End: 2017-01-01 | Stop reason: HOSPADM

## 2017-01-01 RX ORDER — SODIUM BICARBONATE 650 MG/1
650 TABLET ORAL 2 TIMES DAILY
Status: DISCONTINUED | OUTPATIENT
Start: 2017-01-01 | End: 2017-01-01 | Stop reason: HOSPADM

## 2017-01-01 RX ORDER — SODIUM CHLORIDE 9 MG/ML
INJECTION, SOLUTION INTRAVENOUS CONTINUOUS
Status: DISCONTINUED | OUTPATIENT
Start: 2017-01-01 | End: 2017-01-01 | Stop reason: HOSPADM

## 2017-01-01 RX ORDER — METRONIDAZOLE 500 MG/1
500 TABLET ORAL EVERY 8 HOURS SCHEDULED
Status: DISCONTINUED | OUTPATIENT
Start: 2017-01-01 | End: 2017-01-01

## 2017-01-01 RX ORDER — FAMOTIDINE 20 MG/1
20 TABLET, FILM COATED ORAL DAILY
Status: DISCONTINUED | OUTPATIENT
Start: 2017-01-01 | End: 2018-01-01 | Stop reason: HOSPADM

## 2017-01-01 RX ORDER — DEXTROSE MONOHYDRATE 25 G/50ML
25 INJECTION, SOLUTION INTRAVENOUS ONCE
Status: DISCONTINUED | OUTPATIENT
Start: 2017-01-01 | End: 2017-01-01

## 2017-01-01 RX ORDER — BISACODYL 10 MG
10 SUPPOSITORY, RECTAL RECTAL DAILY PRN
Status: DISCONTINUED | OUTPATIENT
Start: 2017-01-01 | End: 2017-01-01 | Stop reason: HOSPADM

## 2017-01-01 RX ORDER — FOLIC ACID 1 MG/1
1 TABLET ORAL DAILY
Qty: 30 TABLET | Refills: 3 | Status: SHIPPED | OUTPATIENT
Start: 2017-01-01 | End: 2017-01-01 | Stop reason: SDUPTHER

## 2017-01-01 RX ORDER — HYDROXYZINE HYDROCHLORIDE 10 MG/1
10 TABLET, FILM COATED ORAL 3 TIMES DAILY PRN
Status: DISCONTINUED | OUTPATIENT
Start: 2017-01-01 | End: 2017-01-01 | Stop reason: HOSPADM

## 2017-01-01 RX ORDER — ONDANSETRON 4 MG/1
4 TABLET, FILM COATED ORAL EVERY 12 HOURS PRN
Qty: 20 TABLET | Refills: 0 | Status: ON HOLD | OUTPATIENT
Start: 2017-01-01 | End: 2017-01-01 | Stop reason: DRUGHIGH

## 2017-01-01 RX ORDER — COLCHICINE 0.6 MG/1
0.6 TABLET ORAL DAILY
Status: DISCONTINUED | OUTPATIENT
Start: 2017-01-01 | End: 2017-01-01

## 2017-01-01 RX ORDER — ACETAMINOPHEN 325 MG/1
650 TABLET ORAL EVERY 4 HOURS PRN
Status: DISCONTINUED | OUTPATIENT
Start: 2017-01-01 | End: 2017-01-01

## 2017-01-01 RX ORDER — SODIUM BICARBONATE 650 MG/1
650 TABLET ORAL 2 TIMES DAILY
Status: DISCONTINUED | OUTPATIENT
Start: 2017-01-01 | End: 2017-01-01

## 2017-01-01 RX ORDER — OMEPRAZOLE 40 MG/1
40 CAPSULE, DELAYED RELEASE ORAL DAILY
Status: DISCONTINUED | OUTPATIENT
Start: 2017-01-01 | End: 2017-01-01 | Stop reason: CLARIF

## 2017-01-01 RX ORDER — ALBUTEROL SULFATE 90 UG/1
2 AEROSOL, METERED RESPIRATORY (INHALATION) EVERY 6 HOURS PRN
Status: DISCONTINUED | OUTPATIENT
Start: 2017-01-01 | End: 2017-01-01 | Stop reason: HOSPADM

## 2017-01-01 RX ORDER — OXYCODONE HYDROCHLORIDE AND ACETAMINOPHEN 5; 325 MG/1; MG/1
1 TABLET ORAL EVERY 8 HOURS PRN
Status: ON HOLD | COMMUNITY
End: 2017-01-01

## 2017-01-01 RX ORDER — COLCHICINE 0.6 MG/1
0.6 TABLET ORAL DAILY
Status: ON HOLD | COMMUNITY
End: 2018-01-01 | Stop reason: HOSPADM

## 2017-01-01 RX ORDER — ONDANSETRON 2 MG/ML
4 INJECTION INTRAMUSCULAR; INTRAVENOUS EVERY 6 HOURS PRN
Status: DISCONTINUED | OUTPATIENT
Start: 2017-01-01 | End: 2017-01-01 | Stop reason: HOSPADM

## 2017-01-01 RX ORDER — FOLIC ACID 1 MG/1
1 TABLET ORAL DAILY
Qty: 30 TABLET | Refills: 5 | Status: ON HOLD | OUTPATIENT
Start: 2017-01-01 | End: 2018-01-01 | Stop reason: HOSPADM

## 2017-01-01 RX ORDER — ZOLPIDEM TARTRATE 5 MG/1
5 TABLET ORAL NIGHTLY PRN
COMMUNITY
End: 2017-01-01 | Stop reason: ALTCHOICE

## 2017-01-01 RX ORDER — FERROUS SULFATE 325(65) MG
325 TABLET ORAL
Qty: 90 TABLET | Refills: 3 | Status: ON HOLD | OUTPATIENT
Start: 2017-01-01 | End: 2018-01-01 | Stop reason: HOSPADM

## 2017-01-01 RX ORDER — SERTRALINE HYDROCHLORIDE 100 MG/1
100 TABLET, FILM COATED ORAL DAILY
Status: DISCONTINUED | OUTPATIENT
Start: 2017-01-01 | End: 2017-01-01

## 2017-01-01 RX ORDER — MIDODRINE HYDROCHLORIDE 5 MG/1
10 TABLET ORAL
Status: DISCONTINUED | OUTPATIENT
Start: 2017-01-01 | End: 2017-01-01 | Stop reason: HOSPADM

## 2017-01-01 RX ORDER — LOPERAMIDE HYDROCHLORIDE 2 MG/1
2 CAPSULE ORAL 3 TIMES DAILY PRN
Qty: 20 CAPSULE | Refills: 0 | Status: SHIPPED | OUTPATIENT
Start: 2017-01-01 | End: 2017-01-01

## 2017-01-01 RX ORDER — SERTRALINE HYDROCHLORIDE 100 MG/1
100 TABLET, FILM COATED ORAL DAILY
COMMUNITY
End: 2017-01-01 | Stop reason: SDUPTHER

## 2017-01-01 RX ORDER — DEXTROSE MONOHYDRATE 25 G/50ML
INJECTION, SOLUTION INTRAVENOUS
Status: COMPLETED
Start: 2017-01-01 | End: 2017-01-01

## 2017-01-01 RX ORDER — MIRTAZAPINE 15 MG/1
7.5 TABLET, FILM COATED ORAL NIGHTLY
Status: DISCONTINUED | OUTPATIENT
Start: 2017-01-01 | End: 2017-01-01 | Stop reason: HOSPADM

## 2017-01-01 RX ORDER — DIPHENHYDRAMINE HCL 25 MG
TABLET ORAL
Status: COMPLETED
Start: 2017-01-01 | End: 2017-01-01

## 2017-01-01 RX ORDER — ACETAMINOPHEN 325 MG/1
650 TABLET ORAL EVERY 4 HOURS PRN
Status: DISCONTINUED | OUTPATIENT
Start: 2017-01-01 | End: 2018-01-01 | Stop reason: HOSPADM

## 2017-01-01 RX ORDER — SERTRALINE HYDROCHLORIDE 100 MG/1
100 TABLET, FILM COATED ORAL DAILY
Qty: 30 TABLET | Refills: 5 | Status: ON HOLD | OUTPATIENT
Start: 2017-01-01 | End: 2017-01-01 | Stop reason: ALTCHOICE

## 2017-01-01 RX ORDER — NICOTINE POLACRILEX 4 MG
15 LOZENGE BUCCAL PRN
Status: DISCONTINUED | OUTPATIENT
Start: 2017-01-01 | End: 2018-01-01 | Stop reason: HOSPADM

## 2017-01-01 RX ORDER — ONDANSETRON 4 MG/1
4 TABLET, FILM COATED ORAL EVERY 8 HOURS PRN
COMMUNITY
End: 2017-01-01 | Stop reason: ALTCHOICE

## 2017-01-01 RX ORDER — SODIUM BICARBONATE 650 MG/1
650 TABLET ORAL 2 TIMES DAILY
Qty: 60 TABLET | Refills: 0 | Status: ON HOLD | OUTPATIENT
Start: 2017-01-01 | End: 2018-01-01 | Stop reason: HOSPADM

## 2017-01-01 RX ORDER — BUDESONIDE AND FORMOTEROL FUMARATE DIHYDRATE 160; 4.5 UG/1; UG/1
2 AEROSOL RESPIRATORY (INHALATION) 2 TIMES DAILY
Status: DISCONTINUED | OUTPATIENT
Start: 2017-01-01 | End: 2017-01-01 | Stop reason: CLARIF

## 2017-01-01 RX ORDER — PRAVASTATIN SODIUM 20 MG
20 TABLET ORAL EVERY EVENING
Status: DISCONTINUED | OUTPATIENT
Start: 2017-01-01 | End: 2018-01-01 | Stop reason: HOSPADM

## 2017-01-01 RX ORDER — DIPHENHYDRAMINE HCL 25 MG
25 TABLET ORAL EVERY 6 HOURS PRN
Status: DISCONTINUED | OUTPATIENT
Start: 2017-01-01 | End: 2018-01-01 | Stop reason: HOSPADM

## 2017-01-01 RX ORDER — GREEN TEA/HOODIA GORDONII 315-12.5MG
1 CAPSULE ORAL 3 TIMES DAILY
Qty: 90 TABLET | Refills: 3 | Status: ON HOLD | OUTPATIENT
Start: 2017-01-01 | End: 2018-01-01 | Stop reason: HOSPADM

## 2017-01-01 RX ORDER — SODIUM CHLORIDE 9 MG/ML
INJECTION, SOLUTION INTRAVENOUS CONTINUOUS
Status: DISCONTINUED | OUTPATIENT
Start: 2017-01-01 | End: 2017-01-01 | Stop reason: ALTCHOICE

## 2017-01-01 RX ORDER — MEGESTROL ACETATE 40 MG/ML
200 SUSPENSION ORAL DAILY
Status: DISCONTINUED | OUTPATIENT
Start: 2017-01-01 | End: 2017-01-01

## 2017-01-01 RX ORDER — ERGOCALCIFEROL (VITAMIN D2) 1250 MCG
50000 CAPSULE ORAL WEEKLY
Qty: 12 CAPSULE | Refills: 0 | Status: ON HOLD | OUTPATIENT
Start: 2017-01-01 | End: 2018-01-01 | Stop reason: HOSPADM

## 2017-01-01 RX ORDER — AMLODIPINE BESYLATE 5 MG/1
5 TABLET ORAL DAILY
Qty: 30 TABLET | Refills: 3 | Status: ON HOLD | OUTPATIENT
Start: 2017-01-01 | End: 2017-01-01 | Stop reason: HOSPADM

## 2017-01-01 RX ORDER — HYDROCHLOROTHIAZIDE 25 MG/1
25 TABLET ORAL DAILY
COMMUNITY
End: 2017-01-01 | Stop reason: ALTCHOICE

## 2017-01-01 RX ORDER — LINEZOLID 2 MG/ML
600 INJECTION, SOLUTION INTRAVENOUS EVERY 12 HOURS
Status: DISCONTINUED | OUTPATIENT
Start: 2017-01-01 | End: 2017-01-01

## 2017-01-01 RX ORDER — COLCHICINE 0.6 MG/1
0.6 TABLET ORAL DAILY
Status: DISCONTINUED | OUTPATIENT
Start: 2017-01-01 | End: 2017-01-01 | Stop reason: SDUPTHER

## 2017-01-01 RX ORDER — FLUTICASONE FUROATE AND VILANTEROL TRIFENATATE 100; 25 UG/1; UG/1
POWDER RESPIRATORY (INHALATION)
COMMUNITY
Start: 2017-01-01 | End: 2017-01-01 | Stop reason: CLARIF

## 2017-01-01 RX ORDER — ONDANSETRON 4 MG/1
4 TABLET, FILM COATED ORAL DAILY PRN
Qty: 20 TABLET | Refills: 0 | Status: SHIPPED | OUTPATIENT
Start: 2017-01-01 | End: 2017-01-01 | Stop reason: SDUPTHER

## 2017-01-01 RX ORDER — PHENYTOIN SODIUM 100 MG/1
100 CAPSULE, EXTENDED RELEASE ORAL 3 TIMES DAILY
Status: DISCONTINUED | OUTPATIENT
Start: 2017-01-01 | End: 2017-01-01 | Stop reason: HOSPADM

## 2017-01-01 RX ORDER — DIPHENHYDRAMINE HYDROCHLORIDE 50 MG/ML
25 INJECTION INTRAMUSCULAR; INTRAVENOUS NIGHTLY PRN
Status: DISCONTINUED | OUTPATIENT
Start: 2017-01-01 | End: 2017-01-01

## 2017-01-01 RX ORDER — AMLODIPINE BESYLATE 5 MG/1
5 TABLET ORAL DAILY
Status: DISCONTINUED | OUTPATIENT
Start: 2017-01-01 | End: 2017-01-01

## 2017-01-01 RX ORDER — POTASSIUM CHLORIDE 20 MEQ/1
20 TABLET, EXTENDED RELEASE ORAL DAILY
Status: DISCONTINUED | OUTPATIENT
Start: 2017-01-01 | End: 2017-01-01

## 2017-01-01 RX ORDER — ASPIRIN 81 MG/1
81 TABLET ORAL ONCE
Status: COMPLETED | OUTPATIENT
Start: 2017-01-01 | End: 2017-01-01

## 2017-01-01 RX ORDER — COLCHICINE 0.6 MG/1
0.6 TABLET ORAL DAILY
Qty: 30 TABLET | Refills: 0 | Status: SHIPPED | OUTPATIENT
Start: 2017-01-01 | End: 2017-01-01 | Stop reason: ALTCHOICE

## 2017-01-01 RX ORDER — LIDOCAINE HYDROCHLORIDE 10 MG/ML
5 INJECTION, SOLUTION EPIDURAL; INFILTRATION; INTRACAUDAL; PERINEURAL ONCE
Status: DISCONTINUED | OUTPATIENT
Start: 2017-01-01 | End: 2017-01-01 | Stop reason: HOSPADM

## 2017-01-01 RX ORDER — DEXTROSE MONOHYDRATE 50 MG/ML
100 INJECTION, SOLUTION INTRAVENOUS PRN
Status: DISCONTINUED | OUTPATIENT
Start: 2017-01-01 | End: 2017-01-01 | Stop reason: HOSPADM

## 2017-01-01 RX ORDER — OXYCODONE HYDROCHLORIDE AND ACETAMINOPHEN 5; 325 MG/1; MG/1
2 TABLET ORAL EVERY 6 HOURS PRN
Status: ON HOLD | COMMUNITY
End: 2017-01-01

## 2017-01-01 RX ORDER — HEPARIN SODIUM 5000 [USP'U]/ML
5000 INJECTION, SOLUTION INTRAVENOUS; SUBCUTANEOUS EVERY 8 HOURS SCHEDULED
Status: DISCONTINUED | OUTPATIENT
Start: 2017-01-01 | End: 2017-01-01 | Stop reason: HOSPADM

## 2017-01-01 RX ORDER — MIDAZOLAM HYDROCHLORIDE 1 MG/ML
0.5 INJECTION INTRAMUSCULAR; INTRAVENOUS ONCE
Status: COMPLETED | OUTPATIENT
Start: 2017-01-01 | End: 2017-01-01

## 2017-01-01 RX ORDER — FLUTICASONE FUROATE AND VILANTEROL 100; 25 UG/1; UG/1
1 POWDER RESPIRATORY (INHALATION) DAILY
COMMUNITY

## 2017-01-01 RX ORDER — MORPHINE SULFATE 2 MG/ML
1 INJECTION, SOLUTION INTRAMUSCULAR; INTRAVENOUS EVERY 8 HOURS PRN
Status: DISCONTINUED | OUTPATIENT
Start: 2017-01-01 | End: 2017-01-01 | Stop reason: HOSPADM

## 2017-01-01 RX ORDER — NICOTINE POLACRILEX 4 MG
15 LOZENGE BUCCAL PRN
Status: DISCONTINUED | OUTPATIENT
Start: 2017-01-01 | End: 2017-01-01 | Stop reason: HOSPADM

## 2017-01-01 RX ORDER — MIDODRINE HYDROCHLORIDE 5 MG/1
5 TABLET ORAL
Status: DISCONTINUED | OUTPATIENT
Start: 2017-01-01 | End: 2018-01-01

## 2017-01-01 RX ORDER — NICOTINE 21 MG/24HR
1 PATCH, TRANSDERMAL 24 HOURS TRANSDERMAL DAILY PRN
Status: DISCONTINUED | OUTPATIENT
Start: 2017-01-01 | End: 2017-01-01 | Stop reason: HOSPADM

## 2017-01-01 RX ORDER — 0.9 % SODIUM CHLORIDE 0.9 %
500 INTRAVENOUS SOLUTION INTRAVENOUS ONCE
Status: COMPLETED | OUTPATIENT
Start: 2017-01-01 | End: 2017-01-01

## 2017-01-01 RX ORDER — HYDROCODONE BITARTRATE AND ACETAMINOPHEN 5; 325 MG/1; MG/1
1 TABLET ORAL EVERY 4 HOURS PRN
Status: DISCONTINUED | OUTPATIENT
Start: 2017-01-01 | End: 2017-01-01 | Stop reason: HOSPADM

## 2017-01-01 RX ORDER — FENTANYL CITRATE 50 UG/ML
25 INJECTION, SOLUTION INTRAMUSCULAR; INTRAVENOUS ONCE
Status: COMPLETED | OUTPATIENT
Start: 2017-01-01 | End: 2017-01-01

## 2017-01-01 RX ADMIN — POTASSIUM CHLORIDE 40 MEQ: 1500 TABLET, EXTENDED RELEASE ORAL at 09:04

## 2017-01-01 RX ADMIN — Medication 10 ML: at 12:14

## 2017-01-01 RX ADMIN — PHENYTOIN SODIUM 100 MG: 100 CAPSULE ORAL at 13:25

## 2017-01-01 RX ADMIN — FERROUS SULFATE TAB EC 325 MG (65 MG FE EQUIVALENT) 325 MG: 325 (65 FE) TABLET DELAYED RESPONSE at 18:15

## 2017-01-01 RX ADMIN — LOPERAMIDE HYDROCHLORIDE 2 MG: 2 CAPSULE ORAL at 13:25

## 2017-01-01 RX ADMIN — Medication 10 ML: at 14:04

## 2017-01-01 RX ADMIN — DIPHENHYDRAMINE HCL 25 MG: 25 TABLET ORAL at 09:05

## 2017-01-01 RX ADMIN — Medication 2 PUFF: at 07:29

## 2017-01-01 RX ADMIN — POTASSIUM CHLORIDE 10 MEQ: 10 INJECTION, SOLUTION INTRAVENOUS at 15:52

## 2017-01-01 RX ADMIN — PHENYTOIN SODIUM 100 MG: 100 CAPSULE ORAL at 19:24

## 2017-01-01 RX ADMIN — Medication 2 PUFF: at 20:19

## 2017-01-01 RX ADMIN — PANTOPRAZOLE SODIUM 40 MG: 40 TABLET, DELAYED RELEASE ORAL at 08:49

## 2017-01-01 RX ADMIN — SODIUM BICARBONATE 650 MG: 650 TABLET ORAL at 11:35

## 2017-01-01 RX ADMIN — Medication 2 PUFF: at 08:40

## 2017-01-01 RX ADMIN — MIDODRINE HYDROCHLORIDE 10 MG: 10 TABLET ORAL at 18:28

## 2017-01-01 RX ADMIN — FERROUS SULFATE TAB 325 MG (65 MG ELEMENTAL FE) 325 MG: 325 (65 FE) TAB at 17:00

## 2017-01-01 RX ADMIN — PHENYTOIN SODIUM 100 MG: 100 CAPSULE ORAL at 19:30

## 2017-01-01 RX ADMIN — CEFUROXIME AXETIL 250 MG: 250 TABLET ORAL at 10:23

## 2017-01-01 RX ADMIN — Medication 1 CAPSULE: at 19:31

## 2017-01-01 RX ADMIN — Medication 10 ML: at 20:28

## 2017-01-01 RX ADMIN — ASPIRIN 81 MG: 81 TABLET, COATED ORAL at 09:51

## 2017-01-01 RX ADMIN — CALCIUM 500 MG: 500 TABLET ORAL at 21:29

## 2017-01-01 RX ADMIN — Medication 10 ML: at 08:15

## 2017-01-01 RX ADMIN — Medication 2 PUFF: at 20:03

## 2017-01-01 RX ADMIN — Medication 10 ML: at 14:32

## 2017-01-01 RX ADMIN — LOPERAMIDE HYDROCHLORIDE 2 MG: 2 CAPSULE ORAL at 13:26

## 2017-01-01 RX ADMIN — SODIUM BICARBONATE: 84 INJECTION, SOLUTION INTRAVENOUS at 22:45

## 2017-01-01 RX ADMIN — CEFUROXIME AXETIL 250 MG: 250 TABLET ORAL at 09:14

## 2017-01-01 RX ADMIN — CEFUROXIME AXETIL 250 MG: 250 TABLET ORAL at 09:03

## 2017-01-01 RX ADMIN — ASPIRIN 81 MG: 81 TABLET, COATED ORAL at 08:37

## 2017-01-01 RX ADMIN — SODIUM CHLORIDE 1000 ML: 9 INJECTION, SOLUTION INTRAVENOUS at 06:52

## 2017-01-01 RX ADMIN — DIPHENHYDRAMINE HCL: 25 TABLET ORAL at 08:30

## 2017-01-01 RX ADMIN — RIFAXIMIN 550 MG: 550 TABLET ORAL at 08:56

## 2017-01-01 RX ADMIN — HEPARIN SODIUM 5000 UNITS: 5000 INJECTION, SOLUTION INTRAVENOUS; SUBCUTANEOUS at 14:36

## 2017-01-01 RX ADMIN — PHENYTOIN SODIUM 100 MG: 100 CAPSULE ORAL at 20:17

## 2017-01-01 RX ADMIN — POTASSIUM CHLORIDE 40 MEQ: 1500 TABLET, EXTENDED RELEASE ORAL at 09:14

## 2017-01-01 RX ADMIN — SERTRALINE 100 MG: 100 TABLET, FILM COATED ORAL at 08:43

## 2017-01-01 RX ADMIN — Medication 1 CAPSULE: at 15:06

## 2017-01-01 RX ADMIN — Medication 1 CAPSULE: at 14:00

## 2017-01-01 RX ADMIN — METRONIDAZOLE 500 MG: 500 TABLET, FILM COATED ORAL at 06:48

## 2017-01-01 RX ADMIN — MIDODRINE HYDROCHLORIDE 5 MG: 10 TABLET ORAL at 17:20

## 2017-01-01 RX ADMIN — PHENYTOIN SODIUM 100 MG: 100 CAPSULE ORAL at 21:29

## 2017-01-01 RX ADMIN — FERROUS SULFATE TAB 325 MG (65 MG ELEMENTAL FE) 325 MG: 325 (65 FE) TAB at 17:39

## 2017-01-01 RX ADMIN — Medication 10 ML: at 08:24

## 2017-01-01 RX ADMIN — Medication 10 ML: at 23:41

## 2017-01-01 RX ADMIN — CIPROFLOXACIN 500 MG: 500 TABLET, FILM COATED ORAL at 08:57

## 2017-01-01 RX ADMIN — POTASSIUM CHLORIDE: 2 INJECTION, SOLUTION, CONCENTRATE INTRAVENOUS at 18:35

## 2017-01-01 RX ADMIN — PHENYTOIN SODIUM 100 MG: 100 CAPSULE ORAL at 09:14

## 2017-01-01 RX ADMIN — PHENYTOIN SODIUM 100 MG: 100 CAPSULE ORAL at 21:45

## 2017-01-01 RX ADMIN — MIDODRINE HYDROCHLORIDE 10 MG: 10 TABLET ORAL at 13:07

## 2017-01-01 RX ADMIN — CEFEPIME HYDROCHLORIDE 1 G: 1 INJECTION, POWDER, FOR SOLUTION INTRAMUSCULAR; INTRAVENOUS at 12:11

## 2017-01-01 RX ADMIN — MEGESTROL ACETATE 40 MG: 40 SUSPENSION ORAL at 09:14

## 2017-01-01 RX ADMIN — SODIUM BICARBONATE 650 MG: 650 TABLET ORAL at 08:43

## 2017-01-01 RX ADMIN — FERROUS SULFATE TAB EC 325 MG (65 MG FE EQUIVALENT) 325 MG: 325 (65 FE) TABLET DELAYED RESPONSE at 08:10

## 2017-01-01 RX ADMIN — SODIUM CHLORIDE: 9 INJECTION, SOLUTION INTRAVENOUS at 17:08

## 2017-01-01 RX ADMIN — FERROUS SULFATE TAB EC 325 MG (65 MG FE EQUIVALENT) 325 MG: 325 (65 FE) TABLET DELAYED RESPONSE at 14:04

## 2017-01-01 RX ADMIN — CEFUROXIME AXETIL 250 MG: 250 TABLET ORAL at 12:51

## 2017-01-01 RX ADMIN — PANTOPRAZOLE SODIUM 40 MG: 40 TABLET, DELAYED RELEASE ORAL at 05:15

## 2017-01-01 RX ADMIN — Medication 2 PUFF: at 09:21

## 2017-01-01 RX ADMIN — Medication 10 ML: at 20:50

## 2017-01-01 RX ADMIN — CALCIUM 500 MG: 500 TABLET ORAL at 08:37

## 2017-01-01 RX ADMIN — RIFAXIMIN 550 MG: 550 TABLET ORAL at 09:48

## 2017-01-01 RX ADMIN — PRAVASTATIN SODIUM 20 MG: 20 TABLET ORAL at 17:20

## 2017-01-01 RX ADMIN — DIPHENHYDRAMINE HCL 25 MG: 25 TABLET ORAL at 23:55

## 2017-01-01 RX ADMIN — FERROUS SULFATE TAB 325 MG (65 MG ELEMENTAL FE) 325 MG: 325 (65 FE) TAB at 12:51

## 2017-01-01 RX ADMIN — Medication 10 ML: at 21:15

## 2017-01-01 RX ADMIN — FERROUS SULFATE TAB 325 MG (65 MG ELEMENTAL FE) 325 MG: 325 (65 FE) TAB at 13:09

## 2017-01-01 RX ADMIN — Medication 10 ML: at 01:42

## 2017-01-01 RX ADMIN — LOPERAMIDE HYDROCHLORIDE 2 MG: 2 CAPSULE ORAL at 04:03

## 2017-01-01 RX ADMIN — Medication 1 CAPSULE: at 19:24

## 2017-01-01 RX ADMIN — CALCIUM 500 MG: 500 TABLET ORAL at 13:12

## 2017-01-01 RX ADMIN — DIPHENHYDRAMINE HCL 25 MG: 25 TABLET ORAL at 21:33

## 2017-01-01 RX ADMIN — DIPHENHYDRAMINE HCL 25 MG: 25 TABLET ORAL at 21:01

## 2017-01-01 RX ADMIN — RIFAXIMIN 550 MG: 550 TABLET ORAL at 21:01

## 2017-01-01 RX ADMIN — Medication 2 PUFF: at 08:04

## 2017-01-01 RX ADMIN — HEPARIN SODIUM 5000 UNITS: 5000 INJECTION, SOLUTION INTRAVENOUS; SUBCUTANEOUS at 05:09

## 2017-01-01 RX ADMIN — SODIUM PHOSPHATE, MONOBASIC, MONOHYDRATE AND SODIUM PHOSPHATE, DIBASIC, ANHYDROUS 10.29 MMOL: 276; 142 INJECTION, SOLUTION INTRAVENOUS at 10:17

## 2017-01-01 RX ADMIN — PRAVASTATIN SODIUM 20 MG: 20 TABLET ORAL at 18:18

## 2017-01-01 RX ADMIN — HYDROXYZINE HYDROCHLORIDE 10 MG: 10 TABLET ORAL at 17:28

## 2017-01-01 RX ADMIN — FERROUS SULFATE TAB EC 325 MG (65 MG FE EQUIVALENT) 325 MG: 325 (65 FE) TABLET DELAYED RESPONSE at 17:40

## 2017-01-01 RX ADMIN — ASPIRIN 81 MG: 81 TABLET, COATED ORAL at 08:42

## 2017-01-01 RX ADMIN — Medication 2 PUFF: at 10:11

## 2017-01-01 RX ADMIN — Medication 1 CAPSULE: at 21:30

## 2017-01-01 RX ADMIN — Medication 1 EACH: at 10:51

## 2017-01-01 RX ADMIN — ASPIRIN 81 MG: 81 TABLET, COATED ORAL at 08:56

## 2017-01-01 RX ADMIN — FERROUS SULFATE TAB 325 MG (65 MG ELEMENTAL FE) 325 MG: 325 (65 FE) TAB at 18:49

## 2017-01-01 RX ADMIN — MIDODRINE HYDROCHLORIDE 5 MG: 5 TABLET ORAL at 18:15

## 2017-01-01 RX ADMIN — Medication 1 CAPSULE: at 09:03

## 2017-01-01 RX ADMIN — Medication 10 ML: at 08:48

## 2017-01-01 RX ADMIN — MEGESTROL ACETATE 40 MG: 40 SUSPENSION ORAL at 08:50

## 2017-01-01 RX ADMIN — MEGESTROL ACETATE 40 MG: 40 SUSPENSION ORAL at 10:23

## 2017-01-01 RX ADMIN — FERROUS SULFATE TAB EC 325 MG (65 MG FE EQUIVALENT) 325 MG: 325 (65 FE) TABLET DELAYED RESPONSE at 08:39

## 2017-01-01 RX ADMIN — MIDODRINE HYDROCHLORIDE 5 MG: 10 TABLET ORAL at 09:03

## 2017-01-01 RX ADMIN — COLCHICINE 0.6 MG: 0.6 TABLET, FILM COATED ORAL at 10:05

## 2017-01-01 RX ADMIN — LOPERAMIDE HYDROCHLORIDE 2 MG: 2 CAPSULE ORAL at 18:39

## 2017-01-01 RX ADMIN — Medication 10 ML: at 09:06

## 2017-01-01 RX ADMIN — RIFAXIMIN 550 MG: 550 TABLET ORAL at 14:04

## 2017-01-01 RX ADMIN — MIDODRINE HYDROCHLORIDE 10 MG: 10 TABLET ORAL at 09:05

## 2017-01-01 RX ADMIN — MAGNESIUM SULFATE IN DEXTROSE 1 G: 10 INJECTION, SOLUTION INTRAVENOUS at 11:18

## 2017-01-01 RX ADMIN — LOPERAMIDE HYDROCHLORIDE 2 MG: 2 CAPSULE ORAL at 08:56

## 2017-01-01 RX ADMIN — SODIUM BICARBONATE 650 MG: 650 TABLET ORAL at 08:40

## 2017-01-01 RX ADMIN — MIDODRINE HYDROCHLORIDE 10 MG: 10 TABLET ORAL at 09:52

## 2017-01-01 RX ADMIN — POTASSIUM CHLORIDE, DEXTROSE MONOHYDRATE AND SODIUM CHLORIDE: 150; 5; 450 INJECTION, SOLUTION INTRAVENOUS at 10:12

## 2017-01-01 RX ADMIN — FERROUS SULFATE TAB 325 MG (65 MG ELEMENTAL FE) 325 MG: 325 (65 FE) TAB at 09:04

## 2017-01-01 RX ADMIN — RIFAXIMIN 550 MG: 550 TABLET ORAL at 09:03

## 2017-01-01 RX ADMIN — FOLIC ACID 1 MG: 1 TABLET ORAL at 09:18

## 2017-01-01 RX ADMIN — POTASSIUM CHLORIDE 20 MEQ: 1500 TABLET, EXTENDED RELEASE ORAL at 08:39

## 2017-01-01 RX ADMIN — ONDANSETRON 4 MG: 2 INJECTION, SOLUTION INTRAMUSCULAR; INTRAVENOUS at 09:14

## 2017-01-01 RX ADMIN — FERROUS SULFATE TAB 325 MG (65 MG ELEMENTAL FE) 325 MG: 325 (65 FE) TAB at 11:34

## 2017-01-01 RX ADMIN — Medication 10 ML: at 14:45

## 2017-01-01 RX ADMIN — COLCHICINE 0.6 MG: 0.6 TABLET, FILM COATED ORAL at 08:38

## 2017-01-01 RX ADMIN — PRAVASTATIN SODIUM 20 MG: 20 TABLET ORAL at 16:07

## 2017-01-01 RX ADMIN — Medication 1 CAPSULE: at 13:25

## 2017-01-01 RX ADMIN — Medication 1 CAPSULE: at 21:45

## 2017-01-01 RX ADMIN — HYDROCORTISONE SODIUM SUCCINATE 100 MG: 100 INJECTION, POWDER, FOR SOLUTION INTRAMUSCULAR; INTRAVENOUS at 18:23

## 2017-01-01 RX ADMIN — PHENYTOIN SODIUM 100 MG: 100 CAPSULE ORAL at 13:13

## 2017-01-01 RX ADMIN — CIPROFLOXACIN 200 MG: 2 INJECTION, SOLUTION INTRAVENOUS at 11:38

## 2017-01-01 RX ADMIN — ENOXAPARIN SODIUM 40 MG: 40 INJECTION SUBCUTANEOUS at 08:41

## 2017-01-01 RX ADMIN — HEPARIN SODIUM 5000 UNITS: 5000 INJECTION, SOLUTION INTRAVENOUS; SUBCUTANEOUS at 05:15

## 2017-01-01 RX ADMIN — FOLIC ACID 1 MG: 1 TABLET ORAL at 10:23

## 2017-01-01 RX ADMIN — CIPROFLOXACIN 200 MG: 2 INJECTION, SOLUTION INTRAVENOUS at 10:06

## 2017-01-01 RX ADMIN — Medication 2 PUFF: at 21:28

## 2017-01-01 RX ADMIN — ENOXAPARIN SODIUM 40 MG: 40 INJECTION SUBCUTANEOUS at 09:55

## 2017-01-01 RX ADMIN — LOPERAMIDE HYDROCHLORIDE 2 MG: 2 CAPSULE ORAL at 00:47

## 2017-01-01 RX ADMIN — Medication 2 PUFF: at 20:24

## 2017-01-01 RX ADMIN — SODIUM CHLORIDE: 900 INJECTION INTRAVENOUS at 13:25

## 2017-01-01 RX ADMIN — FERROUS SULFATE TAB 325 MG (65 MG ELEMENTAL FE) 325 MG: 325 (65 FE) TAB at 09:52

## 2017-01-01 RX ADMIN — POTASSIUM PHOSPHATE, MONOBASIC AND POTASSIUM PHOSPHATE, DIBASIC 10 MMOL: 224; 236 INJECTION, SOLUTION, CONCENTRATE INTRAVENOUS at 14:39

## 2017-01-01 RX ADMIN — PANTOPRAZOLE SODIUM 40 MG: 40 TABLET, DELAYED RELEASE ORAL at 05:36

## 2017-01-01 RX ADMIN — CIPROFLOXACIN 250 MG: 250 TABLET, FILM COATED ORAL at 14:58

## 2017-01-01 RX ADMIN — Medication 10 ML: at 20:20

## 2017-01-01 RX ADMIN — MIDODRINE HYDROCHLORIDE 5 MG: 5 TABLET ORAL at 08:49

## 2017-01-01 RX ADMIN — FERROUS SULFATE TAB 325 MG (65 MG ELEMENTAL FE) 325 MG: 325 (65 FE) TAB at 18:28

## 2017-01-01 RX ADMIN — POTASSIUM CHLORIDE 10 MEQ: 10 INJECTION, SOLUTION INTRAVENOUS at 04:20

## 2017-01-01 RX ADMIN — HEPARIN SODIUM 5000 UNITS: 5000 INJECTION, SOLUTION INTRAVENOUS; SUBCUTANEOUS at 13:23

## 2017-01-01 RX ADMIN — MIDODRINE HYDROCHLORIDE 10 MG: 5 TABLET ORAL at 12:58

## 2017-01-01 RX ADMIN — Medication 2 PUFF: at 19:34

## 2017-01-01 RX ADMIN — Medication 10 ML: at 09:57

## 2017-01-01 RX ADMIN — Medication 1 CAPSULE: at 21:01

## 2017-01-01 RX ADMIN — FERROUS SULFATE TAB EC 325 MG (65 MG FE EQUIVALENT) 325 MG: 325 (65 FE) TABLET DELAYED RESPONSE at 12:34

## 2017-01-01 RX ADMIN — CIPROFLOXACIN 400 MG: 2 INJECTION, SOLUTION INTRAVENOUS at 22:40

## 2017-01-01 RX ADMIN — SODIUM CHLORIDE: 900 INJECTION INTRAVENOUS at 11:48

## 2017-01-01 RX ADMIN — POTASSIUM CHLORIDE: 2 INJECTION, SOLUTION, CONCENTRATE INTRAVENOUS at 18:32

## 2017-01-01 RX ADMIN — ONDANSETRON HYDROCHLORIDE 4 MG: 4 TABLET, FILM COATED ORAL at 11:31

## 2017-01-01 RX ADMIN — COLCHICINE 0.6 MG: 0.6 TABLET, FILM COATED ORAL at 13:11

## 2017-01-01 RX ADMIN — MAGNESIUM SULFATE HEPTAHYDRATE 2 G: 40 INJECTION, SOLUTION INTRAVENOUS at 12:22

## 2017-01-01 RX ADMIN — Medication 2 PUFF: at 07:38

## 2017-01-01 RX ADMIN — HYDROCORTISONE SODIUM SUCCINATE 100 MG: 100 INJECTION, POWDER, FOR SOLUTION INTRAMUSCULAR; INTRAVENOUS at 13:44

## 2017-01-01 RX ADMIN — DIPHENHYDRAMINE HCL 25 MG: 25 TABLET ORAL at 21:13

## 2017-01-01 RX ADMIN — PHENYTOIN SODIUM 100 MG: 100 CAPSULE ORAL at 09:52

## 2017-01-01 RX ADMIN — Medication 1 CAPSULE: at 08:37

## 2017-01-01 RX ADMIN — PRAVASTATIN SODIUM 20 MG: 20 TABLET ORAL at 16:19

## 2017-01-01 RX ADMIN — DAPTOMYCIN 370 MG: 500 INJECTION, POWDER, LYOPHILIZED, FOR SOLUTION INTRAVENOUS at 14:05

## 2017-01-01 RX ADMIN — MEGESTROL ACETATE 200 MG: 40 SUSPENSION ORAL at 08:40

## 2017-01-01 RX ADMIN — FENTANYL CITRATE 25 MCG: 50 INJECTION, SOLUTION INTRAMUSCULAR; INTRAVENOUS at 13:45

## 2017-01-01 RX ADMIN — Medication 10 ML: at 21:18

## 2017-01-01 RX ADMIN — Medication 10 ML: at 09:05

## 2017-01-01 RX ADMIN — FERROUS SULFATE TAB EC 325 MG (65 MG FE EQUIVALENT) 325 MG: 325 (65 FE) TABLET DELAYED RESPONSE at 09:07

## 2017-01-01 RX ADMIN — Medication 10 ML: at 20:32

## 2017-01-01 RX ADMIN — FERROUS SULFATE TAB EC 325 MG (65 MG FE EQUIVALENT) 325 MG: 325 (65 FE) TABLET DELAYED RESPONSE at 10:05

## 2017-01-01 RX ADMIN — HYDROXYZINE HYDROCHLORIDE 10 MG: 10 TABLET ORAL at 19:12

## 2017-01-01 RX ADMIN — HYDROCORTISONE SODIUM SUCCINATE 100 MG: 100 INJECTION, POWDER, FOR SOLUTION INTRAMUSCULAR; INTRAVENOUS at 06:35

## 2017-01-01 RX ADMIN — PHENYTOIN SODIUM 100 MG: 100 CAPSULE ORAL at 15:23

## 2017-01-01 RX ADMIN — POTASSIUM CHLORIDE: 149 INJECTION, SOLUTION, CONCENTRATE INTRAVENOUS at 15:08

## 2017-01-01 RX ADMIN — Medication 1 CAPSULE: at 09:05

## 2017-01-01 RX ADMIN — Medication 1 CAPSULE: at 21:18

## 2017-01-01 RX ADMIN — CIPROFLOXACIN 500 MG: 500 TABLET, FILM COATED ORAL at 15:07

## 2017-01-01 RX ADMIN — Medication 1 CAPSULE: at 16:13

## 2017-01-01 RX ADMIN — Medication 1 CAPSULE: at 08:43

## 2017-01-01 RX ADMIN — RIFAXIMIN 550 MG: 550 TABLET ORAL at 09:59

## 2017-01-01 RX ADMIN — Medication 10 ML: at 02:30

## 2017-01-01 RX ADMIN — DIPHENHYDRAMINE HCL 25 MG: 25 TABLET ORAL at 08:20

## 2017-01-01 RX ADMIN — Medication 1 CAPSULE: at 09:21

## 2017-01-01 RX ADMIN — SODIUM CHLORIDE: 900 INJECTION INTRAVENOUS at 00:14

## 2017-01-01 RX ADMIN — RIFAXIMIN 550 MG: 550 TABLET ORAL at 20:32

## 2017-01-01 RX ADMIN — FOLIC ACID 1 MG: 1 TABLET ORAL at 09:48

## 2017-01-01 RX ADMIN — ERGOCALCIFEROL 50000 UNITS: 1.25 CAPSULE ORAL at 19:32

## 2017-01-01 RX ADMIN — DAPTOMYCIN 425 MG: 500 INJECTION, POWDER, LYOPHILIZED, FOR SOLUTION INTRAVENOUS at 14:54

## 2017-01-01 RX ADMIN — MORPHINE SULFATE 1 MG: 2 INJECTION, SOLUTION INTRAMUSCULAR; INTRAVENOUS at 16:18

## 2017-01-01 RX ADMIN — HEPARIN SODIUM 5000 UNITS: 5000 INJECTION, SOLUTION INTRAVENOUS; SUBCUTANEOUS at 14:53

## 2017-01-01 RX ADMIN — ONDANSETRON 4 MG: 2 INJECTION, SOLUTION INTRAMUSCULAR; INTRAVENOUS at 10:47

## 2017-01-01 RX ADMIN — ESOMEPRAZOLE SODIUM 8 MG/HR: 40 INJECTION, POWDER, LYOPHILIZED, FOR SOLUTION INTRAVENOUS at 11:47

## 2017-01-01 RX ADMIN — CIPROFLOXACIN 200 MG: 2 INJECTION, SOLUTION INTRAVENOUS at 09:57

## 2017-01-01 RX ADMIN — MAGNESIUM SULFATE HEPTAHYDRATE 2 G: 40 INJECTION, SOLUTION INTRAVENOUS at 23:18

## 2017-01-01 RX ADMIN — MORPHINE SULFATE 1 MG: 2 INJECTION, SOLUTION INTRAMUSCULAR; INTRAVENOUS at 00:25

## 2017-01-01 RX ADMIN — COLCHICINE 0.6 MG: 0.6 TABLET, FILM COATED ORAL at 09:17

## 2017-01-01 RX ADMIN — DEXTROSE MONOHYDRATE 12.5 G: 500 INJECTION PARENTERAL at 16:59

## 2017-01-01 RX ADMIN — Medication: at 10:37

## 2017-01-01 RX ADMIN — PHENYTOIN SODIUM 100 MG: 100 CAPSULE ORAL at 13:08

## 2017-01-01 RX ADMIN — RIFAXIMIN 550 MG: 550 TABLET ORAL at 20:39

## 2017-01-01 RX ADMIN — Medication 10 ML: at 08:39

## 2017-01-01 RX ADMIN — POTASSIUM CHLORIDE 10 MEQ: 10 INJECTION, SOLUTION INTRAVENOUS at 14:15

## 2017-01-01 RX ADMIN — FERROUS SULFATE TAB 325 MG (65 MG ELEMENTAL FE) 325 MG: 325 (65 FE) TAB at 11:35

## 2017-01-01 RX ADMIN — DAPTOMYCIN 425 MG: 500 INJECTION, POWDER, LYOPHILIZED, FOR SOLUTION INTRAVENOUS at 14:30

## 2017-01-01 RX ADMIN — HYDROCORTISONE SODIUM SUCCINATE 100 MG: 100 INJECTION, POWDER, FOR SOLUTION INTRAMUSCULAR; INTRAVENOUS at 17:42

## 2017-01-01 RX ADMIN — Medication 10 ML: at 01:02

## 2017-01-01 RX ADMIN — Medication 1 CAPSULE: at 16:36

## 2017-01-01 RX ADMIN — COLCHICINE 0.6 MG: 0.6 CAPSULE ORAL at 08:56

## 2017-01-01 RX ADMIN — I.V. FAT EMULSION 250 ML: 20 EMULSION INTRAVENOUS at 18:32

## 2017-01-01 RX ADMIN — Medication 2 PUFF: at 11:12

## 2017-01-01 RX ADMIN — Medication 1 CAPSULE: at 20:39

## 2017-01-01 RX ADMIN — Medication 2 PUFF: at 08:49

## 2017-01-01 RX ADMIN — SODIUM BICARBONATE: 84 INJECTION, SOLUTION INTRAVENOUS at 14:04

## 2017-01-01 RX ADMIN — POTASSIUM CHLORIDE 40 MEQ: 1500 TABLET, EXTENDED RELEASE ORAL at 11:36

## 2017-01-01 RX ADMIN — COLCHICINE 0.6 MG: 0.6 TABLET, FILM COATED ORAL at 08:49

## 2017-01-01 RX ADMIN — Medication 1 CAPSULE: at 09:57

## 2017-01-01 RX ADMIN — FERROUS SULFATE TAB 325 MG (65 MG ELEMENTAL FE) 325 MG: 325 (65 FE) TAB at 19:31

## 2017-01-01 RX ADMIN — Medication 2 PUFF: at 21:17

## 2017-01-01 RX ADMIN — DIPHENHYDRAMINE HCL 25 MG: 25 TABLET ORAL at 20:32

## 2017-01-01 RX ADMIN — FERROUS SULFATE TAB 325 MG (65 MG ELEMENTAL FE) 325 MG: 325 (65 FE) TAB at 13:45

## 2017-01-01 RX ADMIN — POTASSIUM CHLORIDE 10 MEQ: 10 INJECTION, SOLUTION INTRAVENOUS at 06:37

## 2017-01-01 RX ADMIN — FERROUS SULFATE TAB 325 MG (65 MG ELEMENTAL FE) 325 MG: 325 (65 FE) TAB at 12:02

## 2017-01-01 RX ADMIN — Medication 2 PUFF: at 10:05

## 2017-01-01 RX ADMIN — MIDODRINE HYDROCHLORIDE 5 MG: 10 TABLET ORAL at 13:21

## 2017-01-01 RX ADMIN — DIPHENOXYLATE HYDROCHLORIDE AND ATROPINE SULFATE 1 TABLET: 2.5; .025 TABLET ORAL at 14:49

## 2017-01-01 RX ADMIN — CHOLESTYRAMINE 2 G: 4 POWDER, FOR SUSPENSION ORAL at 09:21

## 2017-01-01 RX ADMIN — POTASSIUM CHLORIDE 10 MEQ: 10 INJECTION, SOLUTION INTRAVENOUS at 07:50

## 2017-01-01 RX ADMIN — POTASSIUM CHLORIDE 10 MEQ: 10 INJECTION, SOLUTION INTRAVENOUS at 17:19

## 2017-01-01 RX ADMIN — CLINDAMYCIN PHOSPHATE 600 MG: 12 INJECTION, SOLUTION INTRAMUSCULAR; INTRAVENOUS at 10:12

## 2017-01-01 RX ADMIN — DIPHENOXYLATE HYDROCHLORIDE AND ATROPINE SULFATE 1 TABLET: 2.5; .025 TABLET ORAL at 09:18

## 2017-01-01 RX ADMIN — FERROUS SULFATE TAB 325 MG (65 MG ELEMENTAL FE) 325 MG: 325 (65 FE) TAB at 16:19

## 2017-01-01 RX ADMIN — PHENYTOIN SODIUM 100 MG: 100 CAPSULE ORAL at 14:53

## 2017-01-01 RX ADMIN — MIDODRINE HYDROCHLORIDE 10 MG: 10 TABLET ORAL at 18:49

## 2017-01-01 RX ADMIN — Medication 2 PUFF: at 10:09

## 2017-01-01 RX ADMIN — Medication 2 PUFF: at 09:17

## 2017-01-01 RX ADMIN — PHENYTOIN SODIUM 100 MG: 100 CAPSULE ORAL at 20:20

## 2017-01-01 RX ADMIN — CALCIUM POLYCARBOPHIL 625 MG: 625 TABLET, FILM COATED ORAL at 21:29

## 2017-01-01 RX ADMIN — Medication 10 ML: at 13:54

## 2017-01-01 RX ADMIN — FOLIC ACID 1 MG: 1 TABLET ORAL at 09:57

## 2017-01-01 RX ADMIN — Medication 2 PUFF: at 11:27

## 2017-01-01 RX ADMIN — MEGESTROL ACETATE 400 MG: 40 SUSPENSION ORAL at 09:25

## 2017-01-01 RX ADMIN — SODIUM BICARBONATE 650 MG: 650 TABLET ORAL at 20:32

## 2017-01-01 RX ADMIN — FOLIC ACID 1 MG: 1 TABLET ORAL at 09:52

## 2017-01-01 RX ADMIN — Medication 10 ML: at 21:00

## 2017-01-01 RX ADMIN — POTASSIUM CHLORIDE 10 MEQ: 10 INJECTION, SOLUTION INTRAVENOUS at 11:19

## 2017-01-01 RX ADMIN — FERROUS SULFATE TAB 325 MG (65 MG ELEMENTAL FE) 325 MG: 325 (65 FE) TAB at 17:20

## 2017-01-01 RX ADMIN — Medication 1 CAPSULE: at 08:50

## 2017-01-01 RX ADMIN — Medication 1 CAPSULE: at 20:18

## 2017-01-01 RX ADMIN — SODIUM CHLORIDE: 9 INJECTION, SOLUTION INTRAVENOUS at 11:49

## 2017-01-01 RX ADMIN — FERROUS SULFATE TAB 325 MG (65 MG ELEMENTAL FE) 325 MG: 325 (65 FE) TAB at 08:42

## 2017-01-01 RX ADMIN — FOLIC ACID 1 MG: 1 TABLET ORAL at 08:42

## 2017-01-01 RX ADMIN — ERGOCALCIFEROL 50000 UNITS: 1.25 CAPSULE ORAL at 09:03

## 2017-01-01 RX ADMIN — RIFAXIMIN 550 MG: 550 TABLET ORAL at 12:34

## 2017-01-01 RX ADMIN — Medication 1 CAPSULE: at 10:24

## 2017-01-01 RX ADMIN — POTASSIUM CHLORIDE 10 MEQ: 10 INJECTION, SOLUTION INTRAVENOUS at 05:13

## 2017-01-01 RX ADMIN — CALCIUM POLYCARBOPHIL 625 MG: 625 TABLET, FILM COATED ORAL at 09:57

## 2017-01-01 RX ADMIN — DAPTOMYCIN 425 MG: 500 INJECTION, POWDER, LYOPHILIZED, FOR SOLUTION INTRAVENOUS at 14:39

## 2017-01-01 RX ADMIN — FERROUS SULFATE TAB 325 MG (65 MG ELEMENTAL FE) 325 MG: 325 (65 FE) TAB at 09:03

## 2017-01-01 RX ADMIN — DEXTROSE MONOHYDRATE 25 G: 25 INJECTION, SOLUTION INTRAVENOUS at 07:56

## 2017-01-01 RX ADMIN — Medication 10 ML: at 01:39

## 2017-01-01 RX ADMIN — FOLIC ACID 1 MG: 1 TABLET ORAL at 19:31

## 2017-01-01 RX ADMIN — SODIUM CHLORIDE, PRESERVATIVE FREE 10 ML: 5 INJECTION INTRAVENOUS at 08:47

## 2017-01-01 RX ADMIN — SODIUM CHLORIDE: 9 INJECTION, SOLUTION INTRAVENOUS at 01:32

## 2017-01-01 RX ADMIN — FERROUS SULFATE TAB EC 325 MG (65 MG FE EQUIVALENT) 325 MG: 325 (65 FE) TABLET DELAYED RESPONSE at 16:47

## 2017-01-01 RX ADMIN — RIFAXIMIN 550 MG: 550 TABLET ORAL at 09:05

## 2017-01-01 RX ADMIN — DIPHENHYDRAMINE HCL 25 MG: 25 TABLET ORAL at 05:21

## 2017-01-01 RX ADMIN — RIFAXIMIN 550 MG: 550 TABLET ORAL at 09:52

## 2017-01-01 RX ADMIN — CALCIUM GLUCONATE 1 G: 94 INJECTION, SOLUTION INTRAVENOUS at 22:45

## 2017-01-01 RX ADMIN — COLCHICINE 0.6 MG: 0.6 CAPSULE ORAL at 09:48

## 2017-01-01 RX ADMIN — Medication 10 ML: at 20:33

## 2017-01-01 RX ADMIN — Medication 1 CAPSULE: at 13:22

## 2017-01-01 RX ADMIN — FERROUS SULFATE TAB EC 325 MG (65 MG FE EQUIVALENT) 325 MG: 325 (65 FE) TABLET DELAYED RESPONSE at 12:13

## 2017-01-01 RX ADMIN — SODIUM BICARBONATE: 84 INJECTION, SOLUTION INTRAVENOUS at 22:40

## 2017-01-01 RX ADMIN — PANTOPRAZOLE SODIUM 40 MG: 40 TABLET, DELAYED RELEASE ORAL at 05:09

## 2017-01-01 RX ADMIN — LOPERAMIDE HYDROCHLORIDE 2 MG: 2 CAPSULE ORAL at 09:18

## 2017-01-01 RX ADMIN — Medication 1 CAPSULE: at 13:45

## 2017-01-01 RX ADMIN — RIFAXIMIN 550 MG: 550 TABLET ORAL at 08:49

## 2017-01-01 RX ADMIN — FERROUS SULFATE TAB 325 MG (65 MG ELEMENTAL FE) 325 MG: 325 (65 FE) TAB at 08:37

## 2017-01-01 RX ADMIN — HEPARIN SODIUM 5000 UNITS: 5000 INJECTION, SOLUTION INTRAVENOUS; SUBCUTANEOUS at 05:21

## 2017-01-01 RX ADMIN — FERROUS SULFATE TAB 325 MG (65 MG ELEMENTAL FE) 325 MG: 325 (65 FE) TAB at 16:59

## 2017-01-01 RX ADMIN — HEPARIN SODIUM 5000 UNITS: 5000 INJECTION, SOLUTION INTRAVENOUS; SUBCUTANEOUS at 21:13

## 2017-01-01 RX ADMIN — PHENYTOIN SODIUM 100 MG: 100 CAPSULE ORAL at 09:57

## 2017-01-01 RX ADMIN — Medication 2 PUFF: at 21:47

## 2017-01-01 RX ADMIN — LOPERAMIDE HYDROCHLORIDE 2 MG: 2 CAPSULE ORAL at 09:47

## 2017-01-01 RX ADMIN — PHENYTOIN SODIUM 100 MG: 100 CAPSULE ORAL at 08:56

## 2017-01-01 RX ADMIN — LOPERAMIDE HYDROCHLORIDE 2 MG: 2 CAPSULE ORAL at 11:32

## 2017-01-01 RX ADMIN — PRAVASTATIN SODIUM 20 MG: 20 TABLET ORAL at 17:41

## 2017-01-01 RX ADMIN — Medication 1 CAPSULE: at 09:48

## 2017-01-01 RX ADMIN — PHENYTOIN SODIUM 100 MG: 100 CAPSULE ORAL at 14:00

## 2017-01-01 RX ADMIN — LOPERAMIDE HYDROCHLORIDE 2 MG: 2 CAPSULE ORAL at 13:54

## 2017-01-01 RX ADMIN — HYDROXYZINE HYDROCHLORIDE 10 MG: 10 TABLET ORAL at 13:12

## 2017-01-01 RX ADMIN — Medication 2 PUFF: at 20:51

## 2017-01-01 RX ADMIN — MIDODRINE HYDROCHLORIDE 10 MG: 5 TABLET ORAL at 08:38

## 2017-01-01 RX ADMIN — PHENYTOIN SODIUM 100 MG: 100 CAPSULE ORAL at 08:43

## 2017-01-01 RX ADMIN — FERROUS SULFATE TAB 325 MG (65 MG ELEMENTAL FE) 325 MG: 325 (65 FE) TAB at 12:06

## 2017-01-01 RX ADMIN — Medication: at 14:49

## 2017-01-01 RX ADMIN — MAGNESIUM SULFATE IN DEXTROSE 1 G: 10 INJECTION, SOLUTION INTRAVENOUS at 10:12

## 2017-01-01 RX ADMIN — MIDODRINE HYDROCHLORIDE 10 MG: 5 TABLET ORAL at 09:57

## 2017-01-01 RX ADMIN — Medication 10 ML: at 23:43

## 2017-01-01 RX ADMIN — RIFAXIMIN 550 MG: 550 TABLET ORAL at 08:10

## 2017-01-01 RX ADMIN — FERROUS SULFATE TAB EC 325 MG (65 MG FE EQUIVALENT) 325 MG: 325 (65 FE) TABLET DELAYED RESPONSE at 13:53

## 2017-01-01 RX ADMIN — I.V. FAT EMULSION 250 ML: 20 EMULSION INTRAVENOUS at 18:35

## 2017-01-01 RX ADMIN — Medication 1 CAPSULE: at 20:25

## 2017-01-01 RX ADMIN — Medication 1 CAPSULE: at 13:12

## 2017-01-01 RX ADMIN — FOLIC ACID 1 MG: 1 TABLET ORAL at 17:07

## 2017-01-01 RX ADMIN — WATER 1 G: 1 INJECTION INTRAMUSCULAR; INTRAVENOUS; SUBCUTANEOUS at 07:57

## 2017-01-01 RX ADMIN — PANTOPRAZOLE SODIUM 40 MG: 40 TABLET, DELAYED RELEASE ORAL at 05:19

## 2017-01-01 RX ADMIN — DIPHENHYDRAMINE HYDROCHLORIDE 25 MG: 50 INJECTION, SOLUTION INTRAMUSCULAR; INTRAVENOUS at 23:41

## 2017-01-01 RX ADMIN — RIFAXIMIN 550 MG: 550 TABLET ORAL at 10:23

## 2017-01-01 RX ADMIN — RIFAXIMIN 550 MG: 550 TABLET ORAL at 20:59

## 2017-01-01 RX ADMIN — PHENYTOIN SODIUM 100 MG: 100 CAPSULE ORAL at 13:45

## 2017-01-01 RX ADMIN — PHENYTOIN SODIUM 100 MG: 100 CAPSULE ORAL at 08:49

## 2017-01-01 RX ADMIN — SODIUM BICARBONATE: 84 INJECTION, SOLUTION INTRAVENOUS at 14:37

## 2017-01-01 RX ADMIN — MIDODRINE HYDROCHLORIDE 10 MG: 10 TABLET ORAL at 09:04

## 2017-01-01 RX ADMIN — MIDODRINE HYDROCHLORIDE 5 MG: 10 TABLET ORAL at 12:24

## 2017-01-01 RX ADMIN — Medication 1 CAPSULE: at 21:29

## 2017-01-01 RX ADMIN — PRAVASTATIN SODIUM 20 MG: 20 TABLET ORAL at 19:31

## 2017-01-01 RX ADMIN — RIFAXIMIN 550 MG: 550 TABLET ORAL at 19:24

## 2017-01-01 RX ADMIN — LINEZOLID 600 MG: 600 INJECTION, SOLUTION INTRAVENOUS at 03:56

## 2017-01-01 RX ADMIN — Medication 10 ML: at 20:49

## 2017-01-01 RX ADMIN — SODIUM CHLORIDE 500 ML: 9 INJECTION, SOLUTION INTRAVENOUS at 11:19

## 2017-01-01 RX ADMIN — HEPARIN SODIUM 5000 UNITS: 5000 INJECTION, SOLUTION INTRAVENOUS; SUBCUTANEOUS at 16:37

## 2017-01-01 RX ADMIN — FOLIC ACID 1 MG: 1 TABLET ORAL at 09:21

## 2017-01-01 RX ADMIN — COLCHICINE 0.6 MG: 0.6 TABLET, FILM COATED ORAL at 21:18

## 2017-01-01 RX ADMIN — HEPARIN SODIUM 5000 UNITS: 5000 INJECTION, SOLUTION INTRAVENOUS; SUBCUTANEOUS at 13:26

## 2017-01-01 RX ADMIN — PANTOPRAZOLE SODIUM 40 MG: 40 TABLET, DELAYED RELEASE ORAL at 07:24

## 2017-01-01 RX ADMIN — LOPERAMIDE HYDROCHLORIDE 2 MG: 2 CAPSULE ORAL at 09:03

## 2017-01-01 RX ADMIN — ESOMEPRAZOLE SODIUM 8 MG/HR: 40 INJECTION, POWDER, LYOPHILIZED, FOR SOLUTION INTRAVENOUS at 01:38

## 2017-01-01 RX ADMIN — SODIUM BICARBONATE: 84 INJECTION, SOLUTION INTRAVENOUS at 11:32

## 2017-01-01 RX ADMIN — RIFAXIMIN 550 MG: 550 TABLET ORAL at 09:14

## 2017-01-01 RX ADMIN — MIDODRINE HYDROCHLORIDE 5 MG: 5 TABLET ORAL at 09:07

## 2017-01-01 RX ADMIN — MIDODRINE HYDROCHLORIDE 5 MG: 10 TABLET ORAL at 16:59

## 2017-01-01 RX ADMIN — PHENYTOIN SODIUM 100 MG: 100 CAPSULE ORAL at 21:18

## 2017-01-01 RX ADMIN — CIPROFLOXACIN 400 MG: 2 INJECTION, SOLUTION INTRAVENOUS at 05:00

## 2017-01-01 RX ADMIN — FOLIC ACID 1 MG: 1 TABLET ORAL at 08:44

## 2017-01-01 RX ADMIN — Medication 10 ML: at 09:07

## 2017-01-01 RX ADMIN — PHENYTOIN SODIUM 100 MG: 100 CAPSULE ORAL at 21:30

## 2017-01-01 RX ADMIN — Medication 10 ML: at 21:26

## 2017-01-01 RX ADMIN — COLCHICINE 0.6 MG: 0.6 TABLET, FILM COATED ORAL at 12:57

## 2017-01-01 RX ADMIN — SERTRALINE 100 MG: 100 TABLET, FILM COATED ORAL at 09:58

## 2017-01-01 RX ADMIN — PHENYTOIN SODIUM 100 MG: 100 CAPSULE ORAL at 20:39

## 2017-01-01 RX ADMIN — POTASSIUM CHLORIDE 10 MEQ: 10 INJECTION, SOLUTION INTRAVENOUS at 12:39

## 2017-01-01 RX ADMIN — MEGESTROL ACETATE 40 MG: 40 SUSPENSION ORAL at 09:04

## 2017-01-01 RX ADMIN — LOPERAMIDE HYDROCHLORIDE 2 MG: 2 CAPSULE ORAL at 20:25

## 2017-01-01 RX ADMIN — RIFAXIMIN 550 MG: 550 TABLET ORAL at 21:45

## 2017-01-01 RX ADMIN — Medication 10 ML: at 09:04

## 2017-01-01 RX ADMIN — FERROUS SULFATE TAB EC 325 MG (65 MG FE EQUIVALENT) 325 MG: 325 (65 FE) TABLET DELAYED RESPONSE at 21:36

## 2017-01-01 RX ADMIN — COLCHICINE 0.6 MG: 0.6 CAPSULE ORAL at 08:38

## 2017-01-01 RX ADMIN — MIDODRINE HYDROCHLORIDE 10 MG: 10 TABLET ORAL at 17:39

## 2017-01-01 RX ADMIN — MIDODRINE HYDROCHLORIDE 10 MG: 5 TABLET ORAL at 10:05

## 2017-01-01 RX ADMIN — Medication 10 ML: at 21:31

## 2017-01-01 RX ADMIN — HEPARIN SODIUM 5000 UNITS: 5000 INJECTION, SOLUTION INTRAVENOUS; SUBCUTANEOUS at 08:06

## 2017-01-01 RX ADMIN — Medication 10 ML: at 09:25

## 2017-01-01 RX ADMIN — HEPARIN SODIUM 5000 UNITS: 5000 INJECTION, SOLUTION INTRAVENOUS; SUBCUTANEOUS at 13:44

## 2017-01-01 RX ADMIN — FERROUS SULFATE TAB 325 MG (65 MG ELEMENTAL FE) 325 MG: 325 (65 FE) TAB at 08:44

## 2017-01-01 RX ADMIN — SODIUM CHLORIDE: 900 INJECTION, SOLUTION INTRAVENOUS at 05:35

## 2017-01-01 RX ADMIN — FOLIC ACID 1 MG: 1 TABLET ORAL at 08:40

## 2017-01-01 RX ADMIN — LOPERAMIDE HYDROCHLORIDE 2 MG: 2 CAPSULE ORAL at 12:13

## 2017-01-01 RX ADMIN — PHENYTOIN SODIUM 100 MG: 100 CAPSULE ORAL at 20:25

## 2017-01-01 RX ADMIN — FOLIC ACID 1 MG: 1 TABLET ORAL at 08:56

## 2017-01-01 RX ADMIN — POTASSIUM CHLORIDE 40 MEQ: 1500 TABLET, EXTENDED RELEASE ORAL at 09:05

## 2017-01-01 RX ADMIN — PHENYTOIN SODIUM 100 MG: 100 CAPSULE ORAL at 08:37

## 2017-01-01 RX ADMIN — PRAVASTATIN SODIUM 20 MG: 20 TABLET ORAL at 19:10

## 2017-01-01 RX ADMIN — PRAVASTATIN SODIUM 20 MG: 20 TABLET ORAL at 18:28

## 2017-01-01 RX ADMIN — MIDODRINE HYDROCHLORIDE 10 MG: 10 TABLET ORAL at 18:36

## 2017-01-01 RX ADMIN — SODIUM BICARBONATE: 84 INJECTION, SOLUTION INTRAVENOUS at 05:43

## 2017-01-01 RX ADMIN — SODIUM CHLORIDE 250 ML: 9 INJECTION, SOLUTION INTRAVENOUS at 08:00

## 2017-01-01 RX ADMIN — FERROUS SULFATE TAB 325 MG (65 MG ELEMENTAL FE) 325 MG: 325 (65 FE) TAB at 08:56

## 2017-01-01 RX ADMIN — HYDROCORTISONE SODIUM SUCCINATE 100 MG: 100 INJECTION, POWDER, FOR SOLUTION INTRAMUSCULAR; INTRAVENOUS at 01:05

## 2017-01-01 RX ADMIN — Medication 10 ML: at 10:22

## 2017-01-01 RX ADMIN — PHENYTOIN SODIUM 100 MG: 100 CAPSULE ORAL at 09:05

## 2017-01-01 RX ADMIN — FERROUS SULFATE TAB EC 325 MG (65 MG FE EQUIVALENT) 325 MG: 325 (65 FE) TABLET DELAYED RESPONSE at 09:18

## 2017-01-01 RX ADMIN — SODIUM CHLORIDE: 9 INJECTION, SOLUTION INTRAVENOUS at 11:45

## 2017-01-01 RX ADMIN — PHENYTOIN SODIUM 100 MG: 100 CAPSULE ORAL at 20:59

## 2017-01-01 RX ADMIN — CALCIUM 500 MG: 500 TABLET ORAL at 21:45

## 2017-01-01 RX ADMIN — ENOXAPARIN SODIUM 30 MG: 30 INJECTION SUBCUTANEOUS at 12:01

## 2017-01-01 RX ADMIN — MAGNESIUM SULFATE HEPTAHYDRATE 2 G: 40 INJECTION, SOLUTION INTRAVENOUS at 17:22

## 2017-01-01 RX ADMIN — LOPERAMIDE HYDROCHLORIDE 2 MG: 2 CAPSULE ORAL at 05:09

## 2017-01-01 RX ADMIN — CIPROFLOXACIN 200 MG: 2 INJECTION, SOLUTION INTRAVENOUS at 10:02

## 2017-01-01 RX ADMIN — FERROUS SULFATE TAB 325 MG (65 MG ELEMENTAL FE) 325 MG: 325 (65 FE) TAB at 16:06

## 2017-01-01 RX ADMIN — FERROUS SULFATE TAB EC 325 MG (65 MG FE EQUIVALENT) 325 MG: 325 (65 FE) TABLET DELAYED RESPONSE at 08:50

## 2017-01-01 RX ADMIN — Medication 10 ML: at 08:43

## 2017-01-01 RX ADMIN — PHENYTOIN SODIUM 100 MG: 100 CAPSULE ORAL at 13:07

## 2017-01-01 RX ADMIN — POTASSIUM CHLORIDE 40 MEQ: 1500 TABLET, EXTENDED RELEASE ORAL at 15:04

## 2017-01-01 RX ADMIN — Medication 2 PUFF: at 20:33

## 2017-01-01 RX ADMIN — PHENYTOIN SODIUM 100 MG: 100 CAPSULE ORAL at 14:54

## 2017-01-01 RX ADMIN — ENOXAPARIN SODIUM 40 MG: 40 INJECTION SUBCUTANEOUS at 09:02

## 2017-01-01 RX ADMIN — FAMOTIDINE 20 MG: 20 TABLET, FILM COATED ORAL at 09:05

## 2017-01-01 RX ADMIN — HYDROXYZINE HYDROCHLORIDE 10 MG: 10 TABLET ORAL at 10:23

## 2017-01-01 RX ADMIN — FERROUS SULFATE TAB 325 MG (65 MG ELEMENTAL FE) 325 MG: 325 (65 FE) TAB at 09:14

## 2017-01-01 RX ADMIN — DIPHENHYDRAMINE HYDROCHLORIDE 25 MG: 50 INJECTION, SOLUTION INTRAMUSCULAR; INTRAVENOUS at 22:21

## 2017-01-01 RX ADMIN — PHENYTOIN SODIUM 100 MG: 100 CAPSULE ORAL at 10:23

## 2017-01-01 RX ADMIN — HYDROCORTISONE SODIUM SUCCINATE 100 MG: 100 INJECTION, POWDER, FOR SOLUTION INTRAMUSCULAR; INTRAVENOUS at 06:07

## 2017-01-01 RX ADMIN — POTASSIUM CHLORIDE: 149 INJECTION, SOLUTION, CONCENTRATE INTRAVENOUS at 14:28

## 2017-01-01 RX ADMIN — SODIUM CHLORIDE: 9 INJECTION, SOLUTION INTRAVENOUS at 03:12

## 2017-01-01 RX ADMIN — FERROUS SULFATE TAB 325 MG (65 MG ELEMENTAL FE) 325 MG: 325 (65 FE) TAB at 09:57

## 2017-01-01 RX ADMIN — PRAVASTATIN SODIUM 20 MG: 20 TABLET ORAL at 16:59

## 2017-01-01 RX ADMIN — Medication 10 ML: at 09:15

## 2017-01-01 RX ADMIN — MIDODRINE HYDROCHLORIDE 10 MG: 10 TABLET ORAL at 17:41

## 2017-01-01 RX ADMIN — FERROUS SULFATE TAB EC 325 MG (65 MG FE EQUIVALENT) 325 MG: 325 (65 FE) TABLET DELAYED RESPONSE at 17:22

## 2017-01-01 RX ADMIN — RIFAXIMIN 550 MG: 550 TABLET ORAL at 21:29

## 2017-01-01 RX ADMIN — FERROUS SULFATE TAB EC 325 MG (65 MG FE EQUIVALENT) 325 MG: 325 (65 FE) TABLET DELAYED RESPONSE at 17:48

## 2017-01-01 RX ADMIN — MIDODRINE HYDROCHLORIDE 10 MG: 5 TABLET ORAL at 18:09

## 2017-01-01 RX ADMIN — ASPIRIN 81 MG: 81 TABLET, COATED ORAL at 09:21

## 2017-01-01 RX ADMIN — CALCIUM POLYCARBOPHIL 625 MG: 625 TABLET, FILM COATED ORAL at 21:44

## 2017-01-01 RX ADMIN — MORPHINE SULFATE 1 MG: 2 INJECTION, SOLUTION INTRAMUSCULAR; INTRAVENOUS at 08:34

## 2017-01-01 RX ADMIN — FERROUS SULFATE TAB EC 325 MG (65 MG FE EQUIVALENT) 325 MG: 325 (65 FE) TABLET DELAYED RESPONSE at 11:55

## 2017-01-01 RX ADMIN — FERROUS SULFATE TAB 325 MG (65 MG ELEMENTAL FE) 325 MG: 325 (65 FE) TAB at 12:00

## 2017-01-01 RX ADMIN — RIFAXIMIN 550 MG: 550 TABLET ORAL at 08:36

## 2017-01-01 RX ADMIN — FERROUS SULFATE TAB EC 325 MG (65 MG FE EQUIVALENT) 325 MG: 325 (65 FE) TABLET DELAYED RESPONSE at 12:57

## 2017-01-01 RX ADMIN — DIPHENOXYLATE HYDROCHLORIDE AND ATROPINE SULFATE 1 TABLET: 2.5; .025 TABLET ORAL at 18:09

## 2017-01-01 RX ADMIN — CIPROFLOXACIN 500 MG: 500 TABLET, FILM COATED ORAL at 05:09

## 2017-01-01 RX ADMIN — PHENYTOIN SODIUM 100 MG: 100 CAPSULE ORAL at 08:41

## 2017-01-01 RX ADMIN — MIDODRINE HYDROCHLORIDE 10 MG: 10 TABLET ORAL at 09:14

## 2017-01-01 RX ADMIN — POTASSIUM CHLORIDE 40 MEQ: 1500 TABLET, EXTENDED RELEASE ORAL at 09:03

## 2017-01-01 RX ADMIN — COLCHICINE 0.6 MG: 0.6 CAPSULE ORAL at 09:57

## 2017-01-01 RX ADMIN — LOPERAMIDE HYDROCHLORIDE 2 MG: 2 CAPSULE ORAL at 20:59

## 2017-01-01 RX ADMIN — ENOXAPARIN SODIUM 40 MG: 40 INJECTION SUBCUTANEOUS at 09:58

## 2017-01-01 RX ADMIN — RIFAXIMIN 550 MG: 550 TABLET ORAL at 21:30

## 2017-01-01 RX ADMIN — Medication 10 ML: at 10:05

## 2017-01-01 RX ADMIN — ACETAMINOPHEN 650 MG: 325 TABLET ORAL at 14:51

## 2017-01-01 RX ADMIN — SERTRALINE 100 MG: 100 TABLET, FILM COATED ORAL at 08:56

## 2017-01-01 RX ADMIN — ESOMEPRAZOLE SODIUM 8 MG/HR: 40 INJECTION, POWDER, LYOPHILIZED, FOR SOLUTION INTRAVENOUS at 17:40

## 2017-01-01 RX ADMIN — FERROUS SULFATE TAB 325 MG (65 MG ELEMENTAL FE) 325 MG: 325 (65 FE) TAB at 11:32

## 2017-01-01 RX ADMIN — LIDOCAINE HYDROCHLORIDE 50 MG: 10 INJECTION, SOLUTION EPIDURAL; INFILTRATION; INTRACAUDAL; PERINEURAL at 12:18

## 2017-01-01 RX ADMIN — FERROUS SULFATE TAB EC 325 MG (65 MG FE EQUIVALENT) 325 MG: 325 (65 FE) TABLET DELAYED RESPONSE at 09:59

## 2017-01-01 RX ADMIN — CALCIUM POLYCARBOPHIL 625 MG: 625 TABLET, FILM COATED ORAL at 08:56

## 2017-01-01 RX ADMIN — MOMETASONE FUROATE AND FORMOTEROL FUMARATE DIHYDRATE 2 PUFF: 200; 5 AEROSOL RESPIRATORY (INHALATION) at 09:24

## 2017-01-01 RX ADMIN — FOLIC ACID 1 MG: 1 TABLET ORAL at 09:04

## 2017-01-01 RX ADMIN — SODIUM CHLORIDE: 4.5 INJECTION, SOLUTION INTRAVENOUS at 10:12

## 2017-01-01 RX ADMIN — Medication 10 ML: at 12:35

## 2017-01-01 RX ADMIN — Medication: at 18:18

## 2017-01-01 RX ADMIN — PROPOFOL 150 MG: 10 INJECTION, EMULSION INTRAVENOUS at 12:18

## 2017-01-01 RX ADMIN — CALCIUM POLYCARBOPHIL 625 MG: 625 TABLET, FILM COATED ORAL at 19:24

## 2017-01-01 RX ADMIN — Medication: at 01:39

## 2017-01-01 RX ADMIN — HEPARIN SODIUM 5000 UNITS: 5000 INJECTION, SOLUTION INTRAVENOUS; SUBCUTANEOUS at 22:24

## 2017-01-01 RX ADMIN — SODIUM CHLORIDE 250 ML: 9 INJECTION, SOLUTION INTRAVENOUS at 17:08

## 2017-01-01 RX ADMIN — DEXTROSE MONOHYDRATE 12.5 G: 25 INJECTION, SOLUTION INTRAVENOUS at 05:37

## 2017-01-01 RX ADMIN — SODIUM BICARBONATE: 84 INJECTION, SOLUTION INTRAVENOUS at 11:31

## 2017-01-01 RX ADMIN — CALCIUM POLYCARBOPHIL 625 MG: 625 TABLET, FILM COATED ORAL at 16:19

## 2017-01-01 RX ADMIN — Medication 10 ML: at 21:27

## 2017-01-01 RX ADMIN — RIFAXIMIN 550 MG: 550 TABLET ORAL at 21:18

## 2017-01-01 RX ADMIN — CIPROFLOXACIN 200 MG: 2 INJECTION, SOLUTION INTRAVENOUS at 11:01

## 2017-01-01 RX ADMIN — Medication 10 ML: at 10:25

## 2017-01-01 RX ADMIN — Medication 1 CAPSULE: at 14:54

## 2017-01-01 RX ADMIN — FERROUS SULFATE TAB 325 MG (65 MG ELEMENTAL FE) 325 MG: 325 (65 FE) TAB at 09:48

## 2017-01-01 RX ADMIN — MIDODRINE HYDROCHLORIDE 10 MG: 10 TABLET ORAL at 12:26

## 2017-01-01 RX ADMIN — DEXTROSE MONOHYDRATE 12.5 G: 500 INJECTION PARENTERAL at 13:02

## 2017-01-01 RX ADMIN — LOPERAMIDE HYDROCHLORIDE 2 MG: 2 CAPSULE ORAL at 14:04

## 2017-01-01 RX ADMIN — SODIUM CHLORIDE: 4.5 INJECTION, SOLUTION INTRAVENOUS at 20:18

## 2017-01-01 RX ADMIN — PHENYTOIN SODIUM 100 MG: 100 CAPSULE ORAL at 09:48

## 2017-01-01 RX ADMIN — ONDANSETRON HYDROCHLORIDE 4 MG: 4 TABLET, FILM COATED ORAL at 08:56

## 2017-01-01 RX ADMIN — COLCHICINE 0.6 MG: 0.6 CAPSULE ORAL at 08:41

## 2017-01-01 RX ADMIN — DEXTROSE MONOHYDRATE 12.5 G: 500 INJECTION PARENTERAL at 07:48

## 2017-01-01 RX ADMIN — CALCIUM POLYCARBOPHIL 625 MG: 625 TABLET, FILM COATED ORAL at 20:40

## 2017-01-01 RX ADMIN — Medication 1 CAPSULE: at 08:42

## 2017-01-01 RX ADMIN — FOLIC ACID 1 MG: 1 TABLET ORAL at 08:50

## 2017-01-01 RX ADMIN — POTASSIUM CHLORIDE 10 MEQ: 10 INJECTION, SOLUTION INTRAVENOUS at 10:06

## 2017-01-01 RX ADMIN — Medication 1 CAPSULE: at 20:19

## 2017-01-01 RX ADMIN — MORPHINE SULFATE 1 MG: 2 INJECTION, SOLUTION INTRAMUSCULAR; INTRAVENOUS at 08:50

## 2017-01-01 RX ADMIN — HEPARIN SODIUM 5000 UNITS: 5000 INJECTION, SOLUTION INTRAVENOUS; SUBCUTANEOUS at 21:03

## 2017-01-01 RX ADMIN — FERROUS SULFATE TAB EC 325 MG (65 MG FE EQUIVALENT) 325 MG: 325 (65 FE) TABLET DELAYED RESPONSE at 18:09

## 2017-01-01 RX ADMIN — SODIUM PHOSPHATE, MONOBASIC, MONOHYDRATE AND SODIUM PHOSPHATE, DIBASIC, ANHYDROUS 10.29 MMOL: 276; 142 INJECTION, SOLUTION INTRAVENOUS at 21:27

## 2017-01-01 RX ADMIN — FERROUS SULFATE TAB 325 MG (65 MG ELEMENTAL FE) 325 MG: 325 (65 FE) TAB at 17:42

## 2017-01-01 RX ADMIN — CIPROFLOXACIN 500 MG: 500 TABLET, FILM COATED ORAL at 03:12

## 2017-01-01 RX ADMIN — Medication 10 ML: at 20:26

## 2017-01-01 RX ADMIN — I.V. FAT EMULSION 250 ML: 20 EMULSION INTRAVENOUS at 17:51

## 2017-01-01 RX ADMIN — SODIUM CHLORIDE: 9 INJECTION, SOLUTION INTRAVENOUS at 04:14

## 2017-01-01 RX ADMIN — PRAVASTATIN SODIUM 20 MG: 20 TABLET ORAL at 18:49

## 2017-01-01 RX ADMIN — RIFAXIMIN 550 MG: 550 TABLET ORAL at 20:17

## 2017-01-01 RX ADMIN — HYDROCORTISONE SODIUM SUCCINATE 100 MG: 100 INJECTION, POWDER, FOR SOLUTION INTRAMUSCULAR; INTRAVENOUS at 23:57

## 2017-01-01 RX ADMIN — MORPHINE SULFATE 1 MG: 2 INJECTION, SOLUTION INTRAMUSCULAR; INTRAVENOUS at 00:22

## 2017-01-01 RX ADMIN — IRON SUCROSE 200 MG: 20 INJECTION, SOLUTION INTRAVENOUS at 09:59

## 2017-01-01 RX ADMIN — FERROUS SULFATE TAB 325 MG (65 MG ELEMENTAL FE) 325 MG: 325 (65 FE) TAB at 11:45

## 2017-01-01 RX ADMIN — DEXTROSE MONOHYDRATE 12.5 G: 500 INJECTION PARENTERAL at 16:07

## 2017-01-01 RX ADMIN — SODIUM CHLORIDE: 9 INJECTION, SOLUTION INTRAVENOUS at 10:15

## 2017-01-01 RX ADMIN — PRAVASTATIN SODIUM 20 MG: 20 TABLET ORAL at 17:39

## 2017-01-01 RX ADMIN — FERROUS SULFATE TAB 325 MG (65 MG ELEMENTAL FE) 325 MG: 325 (65 FE) TAB at 12:24

## 2017-01-01 RX ADMIN — Medication 10 ML: at 09:53

## 2017-01-01 RX ADMIN — CIPROFLOXACIN 500 MG: 500 TABLET, FILM COATED ORAL at 16:00

## 2017-01-01 RX ADMIN — Medication 1 CAPSULE: at 13:07

## 2017-01-01 RX ADMIN — PHENYTOIN SODIUM 100 MG: 100 CAPSULE ORAL at 14:04

## 2017-01-01 RX ADMIN — FAMOTIDINE 20 MG: 20 TABLET, FILM COATED ORAL at 10:23

## 2017-01-01 RX ADMIN — AMLODIPINE BESYLATE 5 MG: 5 TABLET ORAL at 08:41

## 2017-01-01 RX ADMIN — ERGOCALCIFEROL 50000 UNITS: 1.25 CAPSULE ORAL at 08:42

## 2017-01-01 RX ADMIN — HEPARIN SODIUM 5000 UNITS: 5000 INJECTION, SOLUTION INTRAVENOUS; SUBCUTANEOUS at 06:07

## 2017-01-01 RX ADMIN — DEXTROSE MONOHYDRATE 12.5 G: 500 INJECTION PARENTERAL at 14:00

## 2017-01-01 RX ADMIN — RIFAXIMIN 550 MG: 550 TABLET ORAL at 20:20

## 2017-01-01 RX ADMIN — CEFUROXIME AXETIL 250 MG: 250 TABLET ORAL at 09:52

## 2017-01-01 RX ADMIN — PANTOPRAZOLE SODIUM 40 MG: 40 TABLET, DELAYED RELEASE ORAL at 03:12

## 2017-01-01 RX ADMIN — FAMOTIDINE 20 MG: 20 TABLET, FILM COATED ORAL at 13:36

## 2017-01-01 RX ADMIN — Medication 1 CAPSULE: at 15:23

## 2017-01-01 RX ADMIN — CIPROFLOXACIN 500 MG: 500 TABLET, FILM COATED ORAL at 15:23

## 2017-01-01 RX ADMIN — FERROUS SULFATE TAB 325 MG (65 MG ELEMENTAL FE) 325 MG: 325 (65 FE) TAB at 08:50

## 2017-01-01 RX ADMIN — ASPIRIN 81 MG: 81 TABLET, COATED ORAL at 21:36

## 2017-01-01 RX ADMIN — CALCIUM POLYCARBOPHIL 625 MG: 625 TABLET, FILM COATED ORAL at 09:51

## 2017-01-01 RX ADMIN — ASPIRIN 81 MG: 81 TABLET, COATED ORAL at 09:57

## 2017-01-01 RX ADMIN — MAGNESIUM SULFATE IN DEXTROSE 1 G: 10 INJECTION, SOLUTION INTRAVENOUS at 14:49

## 2017-01-01 RX ADMIN — CIPROFLOXACIN 500 MG: 500 TABLET, FILM COATED ORAL at 16:07

## 2017-01-01 RX ADMIN — FERROUS SULFATE TAB EC 325 MG (65 MG FE EQUIVALENT) 325 MG: 325 (65 FE) TABLET DELAYED RESPONSE at 11:47

## 2017-01-01 RX ADMIN — RIFAXIMIN 550 MG: 550 TABLET ORAL at 20:25

## 2017-01-01 RX ADMIN — Medication 2 PUFF: at 20:49

## 2017-01-01 RX ADMIN — Medication 2 PUFF: at 22:09

## 2017-01-01 RX ADMIN — CIPROFLOXACIN 200 MG: 2 INJECTION, SOLUTION INTRAVENOUS at 09:22

## 2017-01-01 RX ADMIN — Medication 1 CAPSULE: at 09:14

## 2017-01-01 RX ADMIN — Medication 400 MG: at 22:45

## 2017-01-01 RX ADMIN — PANTOPRAZOLE SODIUM 40 MG: 40 TABLET, DELAYED RELEASE ORAL at 06:48

## 2017-01-01 RX ADMIN — Medication 1 CAPSULE: at 20:59

## 2017-01-01 RX ADMIN — FERROUS SULFATE TAB 325 MG (65 MG ELEMENTAL FE) 325 MG: 325 (65 FE) TAB at 13:07

## 2017-01-01 RX ADMIN — METRONIDAZOLE 500 MG: 500 TABLET, FILM COATED ORAL at 13:09

## 2017-01-01 RX ADMIN — SODIUM CHLORIDE: 900 INJECTION, SOLUTION INTRAVENOUS at 16:11

## 2017-01-01 RX ADMIN — DEXTROSE MONOHYDRATE 12.5 G: 25 INJECTION, SOLUTION INTRAVENOUS at 14:00

## 2017-01-01 RX ADMIN — CALCIUM 500 MG: 500 TABLET ORAL at 09:48

## 2017-01-01 RX ADMIN — PHENYTOIN SODIUM 100 MG: 100 CAPSULE ORAL at 13:22

## 2017-01-01 RX ADMIN — Medication 2 PUFF: at 09:54

## 2017-01-01 RX ADMIN — FERROUS SULFATE TAB 325 MG (65 MG ELEMENTAL FE) 325 MG: 325 (65 FE) TAB at 19:11

## 2017-01-01 RX ADMIN — SODIUM CHLORIDE 250 ML: 9 INJECTION, SOLUTION INTRAVENOUS at 10:50

## 2017-01-01 RX ADMIN — PANTOPRAZOLE SODIUM 40 MG: 40 TABLET, DELAYED RELEASE ORAL at 09:05

## 2017-01-01 RX ADMIN — RIFAXIMIN 550 MG: 550 TABLET ORAL at 08:43

## 2017-01-01 RX ADMIN — HYDROXYZINE HYDROCHLORIDE 10 MG: 10 TABLET ORAL at 14:28

## 2017-01-01 RX ADMIN — ENOXAPARIN SODIUM 30 MG: 30 INJECTION SUBCUTANEOUS at 23:00

## 2017-01-01 RX ADMIN — HYDROXYZINE HYDROCHLORIDE 10 MG: 10 TABLET ORAL at 09:48

## 2017-01-01 RX ADMIN — RIFAXIMIN 550 MG: 550 TABLET ORAL at 21:36

## 2017-01-01 RX ADMIN — COLCHICINE 0.6 MG: 0.6 TABLET, FILM COATED ORAL at 19:31

## 2017-01-01 RX ADMIN — LOPERAMIDE HYDROCHLORIDE 2 MG: 2 CAPSULE ORAL at 09:57

## 2017-01-01 RX ADMIN — PANTOPRAZOLE SODIUM 40 MG: 40 TABLET, DELAYED RELEASE ORAL at 05:00

## 2017-01-01 RX ADMIN — POTASSIUM CHLORIDE: 2 INJECTION, SOLUTION, CONCENTRATE INTRAVENOUS at 17:51

## 2017-01-01 RX ADMIN — SODIUM BICARBONATE 650 MG: 650 TABLET ORAL at 21:44

## 2017-01-01 RX ADMIN — PHENYTOIN SODIUM 100 MG: 100 CAPSULE ORAL at 15:07

## 2017-01-01 RX ADMIN — PHENYTOIN SODIUM 100 MG: 100 CAPSULE ORAL at 09:03

## 2017-01-01 RX ADMIN — MAGNESIUM SULFATE HEPTAHYDRATE 4 G: 40 INJECTION, SOLUTION INTRAVENOUS at 15:05

## 2017-01-01 RX ADMIN — PRAVASTATIN SODIUM 20 MG: 20 TABLET ORAL at 09:21

## 2017-01-01 RX ADMIN — CIPROFLOXACIN 200 MG: 2 INJECTION, SOLUTION INTRAVENOUS at 10:17

## 2017-01-01 RX ADMIN — SODIUM CHLORIDE: 900 INJECTION INTRAVENOUS at 06:07

## 2017-01-01 RX ADMIN — HEPARIN SODIUM 5000 UNITS: 5000 INJECTION, SOLUTION INTRAVENOUS; SUBCUTANEOUS at 21:57

## 2017-01-01 RX ADMIN — POTASSIUM CHLORIDE 40 MEQ: 20 TABLET, EXTENDED RELEASE ORAL at 11:47

## 2017-01-01 RX ADMIN — FOLIC ACID 1 MG: 1 TABLET ORAL at 09:03

## 2017-01-01 RX ADMIN — LOPERAMIDE HYDROCHLORIDE 2 MG: 2 CAPSULE ORAL at 22:17

## 2017-01-01 RX ADMIN — Medication 1 CAPSULE: at 09:52

## 2017-01-01 RX ADMIN — MIDAZOLAM 0.5 MG: 1 INJECTION INTRAMUSCULAR; INTRAVENOUS at 13:45

## 2017-01-01 RX ADMIN — PRAVASTATIN SODIUM 20 MG: 20 TABLET ORAL at 17:00

## 2017-01-01 RX ADMIN — HYDROXYZINE HYDROCHLORIDE 10 MG: 10 TABLET ORAL at 18:18

## 2017-01-01 RX ADMIN — Medication 10 ML: at 14:39

## 2017-01-01 RX ADMIN — MIDODRINE HYDROCHLORIDE 10 MG: 10 TABLET ORAL at 12:51

## 2017-01-01 RX ADMIN — Medication 1 CAPSULE: at 13:13

## 2017-01-01 RX ADMIN — PHENYTOIN SODIUM 100 MG: 100 CAPSULE ORAL at 21:01

## 2017-01-01 RX ADMIN — ENOXAPARIN SODIUM 40 MG: 40 INJECTION SUBCUTANEOUS at 08:39

## 2017-01-01 RX ADMIN — HEPARIN SODIUM 5000 UNITS: 5000 INJECTION, SOLUTION INTRAVENOUS; SUBCUTANEOUS at 20:19

## 2017-01-01 RX ADMIN — MIDODRINE HYDROCHLORIDE 10 MG: 10 TABLET ORAL at 13:45

## 2017-01-01 RX ADMIN — ONDANSETRON 4 MG: 2 INJECTION, SOLUTION INTRAMUSCULAR; INTRAVENOUS at 09:57

## 2017-01-01 RX ADMIN — RIFAXIMIN 550 MG: 550 TABLET ORAL at 15:29

## 2017-01-01 RX ADMIN — CALCIUM POLYCARBOPHIL 625 MG: 625 TABLET, FILM COATED ORAL at 08:36

## 2017-01-01 RX ADMIN — FOLIC ACID 1 MG: 1 TABLET ORAL at 09:14

## 2017-01-01 ASSESSMENT — PAIN SCALES - GENERAL
PAINLEVEL_OUTOF10: 0
PAINLEVEL_OUTOF10: 7
PAINLEVEL_OUTOF10: 0
PAINLEVEL_OUTOF10: 0
PAINLEVEL_OUTOF10: 7
PAINLEVEL_OUTOF10: 8
PAINLEVEL_OUTOF10: 0
PAINLEVEL_OUTOF10: 7
PAINLEVEL_OUTOF10: 3
PAINLEVEL_OUTOF10: 0
PAINLEVEL_OUTOF10: 0
PAINLEVEL_OUTOF10: 7
PAINLEVEL_OUTOF10: 0
PAINLEVEL_OUTOF10: 7
PAINLEVEL_OUTOF10: 5
PAINLEVEL_OUTOF10: 0
PAINLEVEL_OUTOF10: 0
PAINLEVEL_OUTOF10: 8
PAINLEVEL_OUTOF10: 0
PAINLEVEL_OUTOF10: 0
PAINLEVEL_OUTOF10: 6
PAINLEVEL_OUTOF10: 0
PAINLEVEL_OUTOF10: 0
PAINLEVEL_OUTOF10: 6
PAINLEVEL_OUTOF10: 8
PAINLEVEL_OUTOF10: 0
PAINLEVEL_OUTOF10: 0
PAINLEVEL_OUTOF10: 7
PAINLEVEL_OUTOF10: 0
PAINLEVEL_OUTOF10: 8
PAINLEVEL_OUTOF10: 3
PAINLEVEL_OUTOF10: 9
PAINLEVEL_OUTOF10: 0
PAINLEVEL_OUTOF10: 6
PAINLEVEL_OUTOF10: 0
PAINLEVEL_OUTOF10: 7
PAINLEVEL_OUTOF10: 0
PAINLEVEL_OUTOF10: 0
PAINLEVEL_OUTOF10: 7
PAINLEVEL_OUTOF10: 9
PAINLEVEL_OUTOF10: 3
PAINLEVEL_OUTOF10: 6
PAINLEVEL_OUTOF10: 0
PAINLEVEL_OUTOF10: 7
PAINLEVEL_OUTOF10: 7
PAINLEVEL_OUTOF10: 0
PAINLEVEL_OUTOF10: 8

## 2017-01-01 ASSESSMENT — PAIN DESCRIPTION - PAIN TYPE
TYPE: ACUTE PAIN
TYPE: CHRONIC PAIN
TYPE: ACUTE PAIN

## 2017-01-01 ASSESSMENT — ENCOUNTER SYMPTOMS
RHINORRHEA: 0
BACK PAIN: 0
SINUS PRESSURE: 0
NAUSEA: 0
EYE ITCHING: 0
EYE PAIN: 0
VOMITING: 0
VOMITING: 0
WHEEZING: 0
RHINORRHEA: 0
NAUSEA: 0
DIARRHEA: 0
VOICE CHANGE: 0
SHORTNESS OF BREATH: 0
SHORTNESS OF BREATH: 0
HEARTBURN: 0
DIARRHEA: 0
DIARRHEA: 0
COUGH: 0
SORE THROAT: 0
WHEEZING: 0
ABDOMINAL PAIN: 0
EYE DISCHARGE: 0
COUGH: 0
NAUSEA: 0
SHORTNESS OF BREATH: 0
SHORTNESS OF BREATH: 0
ABDOMINAL PAIN: 0
EYES NEGATIVE: 1
DIARRHEA: 1
VOMITING: 0
VOMITING: 0
ABDOMINAL DISTENTION: 0
BLOOD IN STOOL: 0
ABDOMINAL PAIN: 0
NAUSEA: 0
WHEEZING: 0
SORE THROAT: 0
EYE DISCHARGE: 0
SORE THROAT: 0
CONSTIPATION: 0
WHEEZING: 0
SHORTNESS OF BREATH: 0
COUGH: 0
BACK PAIN: 0
SHORTNESS OF BREATH: 0
RHINORRHEA: 0
DIARRHEA: 0
ABDOMINAL PAIN: 0
CONSTIPATION: 0
BACK PAIN: 0
COUGH: 0

## 2017-01-01 ASSESSMENT — PAIN DESCRIPTION - FREQUENCY
FREQUENCY: CONTINUOUS

## 2017-01-01 ASSESSMENT — PAIN DESCRIPTION - LOCATION
LOCATION: FOOT
LOCATION: ANKLE;FOOT
LOCATION: FOOT
LOCATION: ANKLE;FOOT
LOCATION: ANKLE;FOOT
LOCATION: ABDOMEN
LOCATION: ANKLE;FOOT

## 2017-01-01 ASSESSMENT — PAIN DESCRIPTION - ORIENTATION
ORIENTATION: RIGHT
ORIENTATION: LOWER
ORIENTATION: LOWER
ORIENTATION: RIGHT

## 2017-01-01 ASSESSMENT — PAIN DESCRIPTION - DESCRIPTORS
DESCRIPTORS: ACHING;BURNING
DESCRIPTORS: ACHING;DISCOMFORT
DESCRIPTORS: ACHING;SORE;BURNING
DESCRIPTORS: ITCHING
DESCRIPTORS: ACHING;BURNING

## 2017-01-01 ASSESSMENT — PATIENT HEALTH QUESTIONNAIRE - PHQ9
SUM OF ALL RESPONSES TO PHQ QUESTIONS 1-9: 2
SUM OF ALL RESPONSES TO PHQ9 QUESTIONS 1 & 2: 2
1. LITTLE INTEREST OR PLEASURE IN DOING THINGS: 1
2. FEELING DOWN, DEPRESSED OR HOPELESS: 1

## 2017-01-01 ASSESSMENT — PAIN - FUNCTIONAL ASSESSMENT: PAIN_FUNCTIONAL_ASSESSMENT: 0-10

## 2017-01-01 ASSESSMENT — PAIN DESCRIPTION - ONSET: ONSET: SUDDEN

## 2017-01-18 PROBLEM — E83.39 HYPERPHOSPHATEMIA: Status: ACTIVE | Noted: 2017-01-01

## 2017-01-18 PROBLEM — E86.0 DEHYDRATION: Status: ACTIVE | Noted: 2017-01-01

## 2017-01-18 PROBLEM — A41.9 SEPTIC SHOCK (HCC): Status: ACTIVE | Noted: 2017-01-01

## 2017-01-18 PROBLEM — E87.29 HIGH ANION GAP METABOLIC ACIDOSIS: Status: ACTIVE | Noted: 2017-01-01

## 2017-01-18 PROBLEM — E87.0 HYPERNATREMIA: Status: ACTIVE | Noted: 2017-01-01

## 2017-01-18 PROBLEM — R65.21 SEPTIC SHOCK (HCC): Status: ACTIVE | Noted: 2017-01-01

## 2017-01-18 PROBLEM — E83.42 HYPOMAGNESEMIA: Status: ACTIVE | Noted: 2017-01-01

## 2017-01-18 PROBLEM — R23.9 ALTERATION IN SKIN INTEGRITY DUE TO MOISTURE: Status: ACTIVE | Noted: 2017-01-01

## 2017-01-18 PROBLEM — E87.20 LACTIC ACID ACIDOSIS: Status: ACTIVE | Noted: 2017-01-01

## 2017-03-14 PROBLEM — E87.29 HIGH ANION GAP METABOLIC ACIDOSIS: Status: RESOLVED | Noted: 2017-01-01 | Resolved: 2017-01-01

## 2017-03-14 PROBLEM — R65.21 SEPTIC SHOCK (HCC): Status: RESOLVED | Noted: 2017-01-01 | Resolved: 2017-01-01

## 2017-03-14 PROBLEM — D50.0 IRON DEFICIENCY ANEMIA DUE TO CHRONIC BLOOD LOSS: Status: ACTIVE | Noted: 2017-01-01

## 2017-03-14 PROBLEM — E83.39 HYPERPHOSPHATEMIA: Status: RESOLVED | Noted: 2017-01-01 | Resolved: 2017-01-01

## 2017-03-14 PROBLEM — E87.20 LACTIC ACID ACIDOSIS: Status: RESOLVED | Noted: 2017-01-01 | Resolved: 2017-01-01

## 2017-03-14 PROBLEM — E83.42 HYPOMAGNESEMIA: Status: RESOLVED | Noted: 2017-01-01 | Resolved: 2017-01-01

## 2017-03-14 PROBLEM — A41.9 SEPTIC SHOCK (HCC): Status: RESOLVED | Noted: 2017-01-01 | Resolved: 2017-01-01

## 2017-06-13 PROBLEM — R22.43 LOCALIZED SWELLING OF BOTH LOWER LEGS: Status: ACTIVE | Noted: 2017-01-01

## 2017-06-13 PROBLEM — N18.4 CHRONIC KIDNEY DISEASE (CKD), STAGE IV (SEVERE) (HCC): Status: ACTIVE | Noted: 2017-01-01

## 2017-06-13 PROBLEM — R23.9 ALTERATION IN SKIN INTEGRITY DUE TO MOISTURE: Status: RESOLVED | Noted: 2017-01-01 | Resolved: 2017-01-01

## 2017-06-13 PROBLEM — E55.9 VITAMIN D DEFICIENCY: Status: ACTIVE | Noted: 2017-01-01

## 2017-06-13 PROBLEM — E86.0 DEHYDRATION: Status: RESOLVED | Noted: 2017-01-01 | Resolved: 2017-01-01

## 2017-07-20 PROBLEM — E46 MALNUTRITION (HCC): Status: ACTIVE | Noted: 2017-01-01

## 2017-07-20 PROBLEM — N18.9 ACUTE ON CHRONIC KIDNEY FAILURE (HCC): Status: ACTIVE | Noted: 2017-01-01

## 2017-07-20 PROBLEM — N17.9 ACUTE ON CHRONIC KIDNEY FAILURE (HCC): Status: ACTIVE | Noted: 2017-01-01

## 2017-07-20 NOTE — IP AVS SNAPSHOT
· A summary of your hospital visit  · Follow-up appointments once you have left the hospital  · Your care plan at home      You may receive a survey regarding the care you received during your stay. Your input is valuable to us. We encourage you to complete and return your survey in the envelope provided. We hope you will choose us in the future for your healthcare needs. Patient Information     Patient Name SENIA Mooney 1940      Care Provided at:     Name Address Phone       [address] [place] [address] [phone]            Your Visit    Here you will find information about your visit, including the reason for your visit. Please take this sheet with you when you visit your doctor or other health care provider in the future. It will help determine the best possible medical care for you at that time. If you have any questions once you leave the hospital, please call the department phone number listed below. Why you were here     Your primary diagnosis was: Malnutrition (Hcc)    Your diagnoses also included:  Hypokalemia, Acute On Chronic Kidney Failure (Hcc), Weight Loss, Chronic Kidney Disease (Ckd), Stage Iv (Severe) (Hcc), S/P Right Hemicolectomy, Anemia Of Chronic Disease, Compensated Metabolic Alkalosis, Anemia In Chronic Renal Disease      Visit Information     Date & Time Department Dept. Phone    2017 [facility] [phone]       Follow-up Appointments    Below is a list of your follow-up and future appointments. This may not be a complete list as you may have made appointments directly with providers that we are not aware of or your providers may have made some for you. Please call your providers to confirm appointments. It is important to keep your appointments.  Please bring your current insurance card, photo ID, co-pay, and all medication bottles to your appointment. If self-pay, payment is expected at the time of service. Follow-up Information     Follow up with Merissa Rios MD In 1 week.     Specialty:  Family Medicine    Why:  f/u electrolytes    Contact information:    [address] [place] [address]  [phone]          Schedule an appointment as soon as possible for a visit with Tito Altamirano MD.    Specialty:  Hematology and Oncology    Why:  outpatient EPO    Contact information:     [place] [address] [place] [address]  [phone]          Schedule an appointment as soon as possible for a visit with Jeffy Nye MD.    Specialty:  Gastroenterology    Why:  functional diarrhea    Contact information:    [address]  [facility] [address]  [phone]        Future Appointments     8/2/2017     Lab:  Basic Metabolic Panel        4/8/9275     Lab:  CBC With Auto Differential        8/23/2017 9:45 AM     Appointment with Merissa Rios MD at [place] [address] ([phone])   Please arrive 15 minutes prior to appointment time, bring insurance card and photo ID.    [address] [id number]         Preventive Care        Date Due    Diabetic Foot Exam 7/18/1950    Eye Exam By An Eye Doctor 7/18/1950    Tetanus Combination Vaccine (1 - Tdap) 7/18/1961    Pneumococcal Vaccines (two) for age 72 years & over with: cerebrospinal fluid leaks, cochlear implants, hemoglobinopathies,  asplenia, immunodeficiencies, HIV infection, or chronic renal failure (1 of 2 - PCV13) 7/18/2007    Yearly Flu Vaccine (1) 8/1/2017    Cholesterol Screening 4/5/2018    Hemoglobin A1C (Test For Long-Term Glucose Control) 6/13/2018    Colon Cancer Stool Test 7/27/2018                 Care Plan Once You Return Home    This section includes instructions you will need to follow once you leave the hospital.  Your care team will discuss these with you, so you and those caring for you know how to best care for your health needs at home. This section may also include educational information about certain health topics that may be of help to you. Discharge [address] of Care Form    Patient Name: Lesley Dougherty   :  1940  MRN:  [id number]    [facility] date:  2017  Discharge date:  2017    Code Status Order: Full Code   Advance Directives: No    Admitting Physician:  Tiffanie Carranza MD  PCP: Teresa Hartman MD    Discharging Nurse: [facility] Unit/Room#: 9840/3831-02  Discharging Unit Phone Number: [phone]    Emergency Contact:   Contact 1: Name: Valerie Telles  Contact 1: Number: [phone]  Contact 1: Relationship: Friends    Past Surgical History:  Past Surgical History:   Procedure Laterality Date    ABDOMINAL ADHESION SURGERY  10/20/2016    robotic assisted laparascopic    CHOLECYSTECTOMY      COLONOSCOPY  2016    adenomatous polyp; IN THE DISTAL ASCENDING COLON AREA WITH LITTLE LOOPING OF THE SCOPE THERE A A LARGE ( ABOUT 6-7 CM ) FLAT POLYPOID LESION NOTED OVER A FOLD IT WAS VILLOUS APPEARING AND IS NOT AMENABLE TO BE REMOVED COMPLETELY BY ENDOSCOPY ALONE. MULTIPLE BIOPSIES WERE TAKEN AND PICTURES WERE TAKEN.: HEPATIC FLEXURE A LARGE LIPOMA WAS ALSO NOTED    HEMICOLECTOMY  10/20/2016    open    HYSTERECTOMY      TUMOR REMOVAL      Lt. face close to ear       Immunization History: There is no immunization history for the selected administration types on file for this patient.     Active Problems:  Patient Active Problem List   Diagnosis    Obesity due to excess calories    Hypertension    Lymphedema    Adenomatous polyp    Internal hemorrhoids    Atelectasis    Tubulovillous adenoma of colon    S/P right hemicolectomy    Hypokalemia    Debilitated patient    Hypernatremia    Metabolic acidosis    Bandemia    Iron deficiency anemia due to chronic blood loss  Vitamin D deficiency    Localized swelling of both lower legs    Chronic kidney disease (CKD), stage IV (severe) (HCC)    Malnutrition (HCC)    Acute on chronic kidney failure (HCC)    Weight loss    Anemia of chronic disease    Anemia in chronic renal disease       Isolation/Infection:   Isolation     No Isolation            Nurse Assessment:  Last Vital Signs: /63  Pulse 78  Temp 98.7 °F (37.1 °C) (Oral)   Resp 16  Ht 5' 4\" (1.626 m)  Wt 168 lb 9.6 oz (76.5 kg)  SpO2 97%  BMI 28.94 kg/m2    Last documented pain score (0-10 scale): Pain Level: 6 (with weight bearing)  Last Weight:   Wt Readings from Last 1 Encounters:   07/28/17 168 lb 9.6 oz (76.5 kg)     Mental Status:  oriented, alert, coherent, logical, thought processes intact and able to concentrate and follow conversation     IV Access:  - None    Nursing Mobility/ADLs:  Walking   Independent  Transfer  Independent  Bathing  Assisted  Dressing  Assisted  Toileting  Assisted  Feeding  Independent  Med [address]  Med Delivery   whole    Wound Care Documentation and Therapy:  Pressure Ulcer 01/18/17 Buttocks Right; Lower (Active)   Number of days:191       Pressure Ulcer 01/18/17 Buttocks Left; Lower excoriation, pt states from nursing home (Active)   Number of days:191       Pressure Ulcer 01/18/17 Buttocks Inner excoriation between buttocks, pt states from nursing home (Active)   Number of days:191       Wound 01/18/17 Other (Comment) Breast Other (Comment) excoriation/redness (Active)   Number of days:191       Wound 01/27/17 Skin tear Abdomen Anterior (Active)   Number of days:181        Elimination:  Continence:   · Bowel:  Yes  · Bladder: Yes  Urinary Catheter: None   Colostomy/Ileostomy/Ileal Conduit: No    Date of Last BM: 07/29/2017    Intake/Output Summary (Last 24 hours) at 07/28/17 0856  Last data filed at 07/28/17 0526   Gross per 24 hour   Intake          3178.67 ml   Output                0 ml Net          3178.67 ml     I/O last 3 completed shifts: In: 3178.7 [P.O.:1750; I.V.:1087; Blood:341.7]  Out: -     Safety Concerns: At Risk for Falls    Impairments/Disabilities:      None    Nutrition Therapy:  Current Nutrition Therapy:   - Oral Diet:  General    Routes of Feeding: Oral  Liquids: No Restrictions  Daily Fluid Restriction: no  Last Modified Barium Swallow with Video (Video Swallowing Test): not done    Treatments at the Time of Hospital Discharge:   Respiratory Treatments: n/a  Oxygen Therapy:  is not on home oxygen therapy. Ventilator:    - No ventilator support    Rehab Therapies: Physical Therapy  Weight Bearing Status/Restrictions: No weight bearing restirctions  Other Medical Equipment (for information only, NOT a DME order):  walker  Other Treatments: n/a    Patient's personal belongings (please select all that are sent with patient):  None    RN SIGNATURE:  Electronically signed by Ella Connolly RN on 7/29/17 at 12:00 PM    PHYSICIAN SECTION    Prognosis: Fair    Condition at Discharge: Stable    Rehab Potential (if transferring to Rehab): Fair    Recommended Labs or Other Treatments After Discharge: per discharge summary    Physician Certification: I certify the above information and transfer of Jessica Gaviria  is necessary for the continuing treatment of the diagnosis listed and that she requires Home Care for greater 30 days.      Update Admission H&P: NO changes in H&P     PHYSICIAN SIGNATURE:  Electronically signed by Everett Montero MD on 7/29/17 at 12:06 PM    CASE MANAGEMENT/SOCIAL WORK SECTION    Inpatient Status Date: 7/20/17    [facility] Readmission Risk Assessment Score:  Risk Score: 20   (Score > 14= high risk for readmission)    Discharging to Facility/ Agency   · Name:  [facility]  · Address:  · Phone: [phone]  · Fax: [phone]    Dialysis Facility (if applicable)   · Name:  · Address:  · Dialysis Schedule:  · Phone:  · Fax: / signature: Electronically signed by Simona Ruiz, TAWANA on 7/21/17 at 10:07 AM      Labs and Other Follow-ups after Discharge     Santy Adler MD, Hematology/Oncology [facility]*       The patient can be scheduled with any member of the group, including the provider with the first available appointments. [facility] [address] [facility] MD  Lance Garcia [address] [place] [address] Ave  [phone]       Basic Metabolic Panel           CBC With Auto Differential                   MyChart Signup     Our records indicate that you have declined MyChart signup. View your information online  ? Review your current list of  medications, immunization, and allergies. ? Review your future test results online . ? Review your discharge instructions provided by your caregivers at discharge    Certain functionality such as prescription refills, scheduling appointments or sending messages to your provider are not activated if your provider does not use [employer] in his/her office    For questions regarding your MyChart account call [phone]. If you have a clinical question, please call your doctor's office. The information on all pages of the After Visit Summary has been reviewed with me, the patient and/or responsible adult, by my health care provider(s). I had the opportunity to ask questions regarding this information. I understand I should dispose of my armband safely at home to protect my health information. A complete copy of the After Visit Summary has been given to me, the patient and/or responsible adult.            Patient Signature/Responsible Adult:____________________    Clinician Signature:_____________________    Date:_____________________    Time:_____________________

## 2017-07-20 NOTE — H&P
grandmother, and sister; Ovarian Cancer in her mother. HOME MEDICATIONS:     Prior to Admission medications    Medication Sig Start Date End Date Taking? Authorizing Provider   metFORMIN (GLUCOPHAGE) 500 MG tablet Take 500 mg by mouth 2 times daily (with meals)   Yes Historical Provider, MD   potassium chloride (KLOR-CON M) 20 MEQ extended release tablet Take 1 tablet by mouth daily 6/19/17   Cristiana Adrian MD   albuterol (PROVENTIL) (2.5 MG/3ML) 0.083% nebulizer solution Take 3 mLs by nebulization every 6 hours as needed for Wheezing 6/15/17   Rosa Garvin MD   sertraline (ZOLOFT) 100 MG tablet  6/4/17   Historical Provider, MD Trisha Gregory ELLIPTA 100-25 MCG/INH AEPB inhaler  5/8/17   Historical Provider, MD   aspirin 81 MG tablet Take 81 mg by mouth    Historical Provider, MD   ferrous sulfate 325 (65 Fe) MG tablet Take 1 tablet by mouth 3 times daily (with meals) 6/13/17   Rosa Garvin MD   Lactobacillus (PROBIOTIC ACIDOPHILUS) TABS Take 1 tablet by mouth 3 times daily 6/13/17   Rosa Garvin MD   ergocalciferol (DRISDOL) 16275 units capsule Take 1 capsule by mouth once a week 6/13/17   Rosa Garvin MD       ALLERGIES:      Review of patient's allergies indicates no known allergies. SOCIAL HISTORY:      reports that she has never smoked. She does not have any smokeless tobacco history on file. She reports that she does not drink alcohol or use illicit drugs. REVIEW OF SYSTEMS:     CONSTITUTIONAL:  +jamin for fatigue and weakness  EYES: negative for double vision  HEENT: No headaches, no rhinorrhea, no nasal congestion, no sore throat, no difficulty swallowing  RESPIRATORY:negative for dyspnea, no wheezing.   CARDIOVASCULAR: negative for chest pain, no palpitations, no fatigue, no edema   GASTROINTESTINAL: +jamin for nausea and vomiting, diarrhea, decreased po intake, intermitten stomach pain   GENITOURINARY: negative for frequency, no dysuria, no nocturia   ALLERGIC/IMMUNOLOGIC: negative for urticaria ENDOCRINE: no polydipsia, no polyuria, no hot or cold intolerance  MUSCULOSKELETAL: +jamin for weakness in both legs  NEUROLOGICAL: no numbness, no tingling  BEHAVIOR/PSYCH: negative      PHYSICAL EXAM:     Vitals:    07/20/17 1845   BP: 95/71   Pulse: 123   Resp: 17   TempSrc: Oral   SpO2: 100%       No intake or output data in the 24 hours ending 07/20/17 1954    General Appearance  Alert , awake , oriented x 3, not in acute distress  HEENT - Head is normocephalic, atraumatic. Eye - no icterus no redness, EOMI  Ear- NO ear pain, normal external ear , no discharge  Lungs - Bilateral equal air entry , no wheezes, rales or rhonchi, aeration good  Cardiovascular - Heart sounds are normal.  Regular rhythm, normal rate without murmur, gallop or rub. Abdomen - Soft, nontender, nondistended, no masses or organomegaly  Neurologic - There are no new focal motor or sensory deficits, muscle strength 5/5 in all extremities, normal muscle tone and bulk, no abnormal sensation.   Skin - No bruising or bleeding on exposed skin area  Extremities - No cyanosis, clubbing or edema  Psych - normal affect       DIAGNOSTICS:      Laboratory Testing:    Recent Results (from the past 24 hour(s))   Albumin    Collection Time: 07/20/17 11:44 AM   Result Value Ref Range    Alb 3.8 3.5 - 5.2 g/dL   Prealbumin    Collection Time: 07/20/17 11:44 AM   Result Value Ref Range    Prealbumin 29.5 20 - 40 mg/dL   Transferrin    Collection Time: 07/20/17 11:44 AM   Result Value Ref Range    Transferrin 179 (L) 200 - 360 mg/dL   Vitamin D 25 Hydroxy    Collection Time: 07/20/17 11:44 AM   Result Value Ref Range    Vit D, 25-Hydroxy 26.3 (L) 30.0 - 100.0 ng/mL   Basic Metabolic Panel    Collection Time: 07/20/17 11:44 AM   Result Value Ref Range    Glucose 84 70 - 99 mg/dL    BUN 18 8 - 23 mg/dL    CREATININE 3.18 (H) 0.50 - 0.90 mg/dL    Bun/Cre Ratio NOT REPORTED 9 - 20    Calcium 9.9 8.6 - 10.4 mg/dL    Sodium 144 135 - 144 mmol/L    Potassium 2.4 (LL) 3.7 - 5.3 mmol/L    Chloride 105 98 - 107 mmol/L    CO2 8 (LL) 20 - 31 mmol/L    Anion Gap 31 (H) 9 - 17 mmol/L    GFR Non-African American 14 (L) >60 mL/min    GFR  17 (L) >60 mL/min    GFR Comment          GFR Staging NOT REPORTED          Imaging/Diagonstics:  No results found. ASSESSMENT:       Principal Problem:    Malnutrition ([address]  Active Problems:    Tubulovillous adenoma of colon    Hypokalemia    Acute on chronic kidney failure (HCC)      PLAN:     Patient status: Admit the patient as inpatient in Med/Surge      DVT prophylaxis: heparin (porcine) injection 5,000 TID  GI prophylaxis: reason for no GI prophylaxis: Not indicated      Consultations:   Consults: IP CONSULT TO GENERAL SURGERY  IP CONSULT TO SOCIAL WORK    Malnutrition  - Prealbumin, albumin  - Retinoid binding protein  - Transferrin & B12  - Amylase, lipase  - Renal function test  -D5 1/2 NS w/ KCl  - UA, BCX, stool culture  - Electrolyte replacement  - Consult gen surg for possible peg tube palcement    Diarrhea s/p tubulovillous adenoma removal  - C-Diff stool antigen  - Fecal leukocytes  - O&P  - Calprotectin stool  - Hydration    HORTENCIA on CKD Stage IV  - Urine sodium  - Urine Cr.  - Calculate Fena  - Baseline Cr. 2.08, today 3.18  - Hydration  - Renal diet    Hypokalemia  - D5 1/2 NS w/ KCl  - Potassium replacement per protocol  - EKG     The severity of this patient's signs and symptoms (specify decreased PO intake, malnutrition, hypokalemia ) indicate the need for an inpatient admission.       Above plan discussed with the patient in room, who agreed to the above plan     Plan will be discussed with the attending, Dr. Bin Good MD  [facility] Resident  7/20/2017 7:54 PM

## 2017-07-21 NOTE — CONSULTS
Obesity     Pneumonia 03/17/2015    Stacey Diesel    Use of cane as ambulatory aid     as needed    Wears glasses        Past Surgical History:        Procedure Laterality Date    ABDOMINAL ADHESION SURGERY  10/20/2016    robotic assisted laparascopic    CHOLECYSTECTOMY      COLONOSCOPY  08/31/2016    adenomatous polyp; IN THE DISTAL ASCENDING COLON AREA WITH LITTLE LOOPING OF THE SCOPE THERE A A LARGE ( ABOUT 6-7 CM ) FLAT POLYPOID LESION NOTED OVER A FOLD IT WAS VILLOUS APPEARING AND IS NOT AMENABLE TO BE REMOVED COMPLETELY BY ENDOSCOPY ALONE. MULTIPLE BIOPSIES WERE TAKEN AND PICTURES WERE TAKEN.: HEPATIC FLEXURE A LARGE LIPOMA WAS ALSO NOTED    HEMICOLECTOMY  10/20/2016    open    HYSTERECTOMY      TUMOR REMOVAL      Lt. face close to ear       Current Medications:      loperamide (IMODIUM) 1 MG/5ML solution 4 mg 4x Daily PRN   potassium chloride 20 mEq, sodium bicarbonate 150 mEq in dextrose 5 % and 0.45 % NaCl 1,000 mL infusion Continuous   albuterol (PROVENTIL) nebulizer solution 2.5 mg Q6H PRN   [START ON 7/24/2017] vitamin D (ERGOCALCIFEROL) capsule 50,000 Units Weekly   ferrous sulfate EC tablet 325 mg TID WC   sodium chloride flush 0.9 % injection 10 mL 2 times per day   sodium chloride flush 0.9 % injection 10 mL PRN   potassium chloride (KLOR-CON M) extended release tablet 40 mEq PRN   Or    potassium chloride 20 MEQ/15ML (10%) oral solution 40 mEq PRN   Or    potassium chloride 10 mEq/100 mL IVPB (Peripheral Line) PRN   magnesium sulfate 1 g in dextrose 5% 100 mL IVPB PRN   acetaminophen (TYLENOL) tablet 650 mg Q4H PRN   ondansetron (ZOFRAN) injection 4 mg Q6H PRN   nicotine (NICODERM CQ) 21 MG/24HR 1 patch Daily PRN   dextrose 5 % and 0.45 % NaCl with KCl 20 mEq infusion Continuous   heparin (porcine) injection 5,000 Units Once   potassium chloride 20 MEQ/15ML (10%) oral solution 40 mEq BID       Allergies:  Review of patient's allergies indicates no known allergies.     Social History:   Social History     Social History    Marital status:      Spouse name: N/A    Number of children: 0    Years of education: N/A     Occupational History    Not on file. Social History Main Topics    Smoking status: Never Smoker    Smokeless tobacco: Not on file    Alcohol use No    Drug use: No    Sexual activity: Not on file     Other Topics Concern    Not on file     Social History Narrative       Family History:   Family History   Problem Relation Age of Onset    Ovarian Cancer Mother     No Known Problems Father     Heart Disease Sister     Heart Surgery Sister     Heart Surgery Brother     No Known Problems Maternal Grandmother     No Known Problems Maternal Grandfather     No Known Problems Paternal Grandmother     No Known Problems Paternal Grandfather     No Known Problems Sister        Review of Systems:    Constitutional: No fever, no chills, no lethargy, no weakness. HEENT:  No headache, otalgia, itchy eyes, nasal discharge or sore throat. Cardiac:  No chest pain, dyspnea, orthopnea or PND. Chest:              No cough, phlegm or wheezing. Abdomen:  No abdominal pain, nausea or vomiting. Neuro:  No focal weakness, abnormal movements orseizure like activity. Skin:   No rashes, no itching. :   No hematuria, no pyuria, no dysuria, no flank pain. Extremities:  No swelling or joint pains.       Objective:  CURRENT TEMPERATURE:  Temp: 97.6 °F (36.4 °C)  MAXIMUM TEMPERATURE OVER 24HRS:  Temp (24hrs), Av.7 °F (36.5 °C), Min:97.6 °F (36.4 °C), Max:97.8 °F (36.6 °C)    CURRENT RESPIRATORY RATE:  Resp: 16  CURRENT PULSE:  Pulse: 81  CURRENT BLOOD PRESSURE:  BP: 111/72  24HR BLOOD PRESSURE RANGE:  Systolic (67XUK), LFU:693 , Min:95 , OHL:458   ; Diastolic (14UIV), YBZ:69, Min:51, Max:72    24HR INTAKE/OUTPUT:  No intake or output data in the 24 hours ending 17 1348  Patient Vitals for the past 96 hrs (Last 3 readings):   Weight   17 142 lb (64.4 kg)     Physical Exam:  General appearance:Awake, alert, in no acute distress  Skin: warm and dry, no rash or erythema  Eyes: conjunctivae normal and sclera anicteric  ENT: :no thrush no pharyngeal congestion    Neck: no carotid bruit ,no  JVD,no carotid Lymphadenopathy, noThyromegaly Pulmonary: no wheezing or rhonchi. No rales heard. Cardiovascular: Normal S1 & S2,  No S3 or  S4, no Pericardial Rub no Murmur   Abdomen: soft nontender, bowel sounds present, no organomegaly,  no ascites  Extremities:no cyanosis, clubbing or edema    Labs:   CBC:  Recent Labs      07/21/17   0651   WBC  8.1   RBC  2.95*   HGB  8.1*   HCT  25.4*   MCV  86.2   MCH  27.4   MCHC  31.8   RDW  18.4*   PLT  225   MPV  8.9      BMP: Recent Labs      07/20/17   1144  07/21/17   0651   NA  144  145*   K  2.4*  2.7*   CL  105  107   CO2  8*  7*   BUN  18  19   CREATININE  3.18*  3.32*   GLUCOSE  84  61*   CALCIUM  9.9  9.4        Phosphorus:  No results for input(s): PHOS in the last 72 hours. Magnesium:   Recent Labs      07/21/17   0651   MG  1.5*     Albumin:   Recent Labs      07/20/17   1144  07/21/17   0651   LABALBU  3.8  3.2*       IRON:    Lab Results   Component Value Date    IRON 41 04/05/2017     Iron Saturation:  No components found for: PERCENTFE  TIBC:    Lab Results   Component Value Date    TIBC 162 04/05/2017     FERRITIN:    Lab Results   Component Value Date    FERRITIN 973 04/05/2017     SPEP: Lab Results   Component Value Date    PROT 5.8 07/21/2017    ALBCAL 3.0 01/18/2017    ALBPCT 61 01/18/2017    LABALPH 0.1 01/18/2017    LABALPH 0.5 01/18/2017    A1PCT 3 01/18/2017    A2PCT 10 01/18/2017    LABBETA 0.6 01/18/2017    BETAPCT 12 01/18/2017    GAMGLOB 0.7 01/18/2017    GGPCT 14 01/18/2017    PATH ELECTRONICALLY SIGNED.  Debbi Nolan M.D. 01/18/2017     UPEP:   Lab Results   Component Value Date    TPU 14 01/18/2017    TPU 14 01/18/2017        C3:   Lab Results   Component Value Date    C3 94 01/18/2017     C4:   Lab Results Component Value Date    C4 30 01/18/2017     MPO ANCA:    Lab Results   Component Value Date    MPO 7 01/18/2017    . PR3 ANCA:    Lab Results   Component Value Date    PR3 6 01/18/2017     Urine Sodium:    Lab Results   Component Value Date    WESTLEY 40 12/07/2016      Urine Creatinine:    Lab Results   Component Value Date    LABCREA 116.2 12/07/2016     Urine Eosinophils:   Lab Results   Component Value Date    UREO NONE SEEN 01/18/2017     Urine Protein:    Lab Results   Component Value Date    TPU 14 01/18/2017    TPU 14 01/18/2017     Urinalysis:  U/A: Lab Results   Component Value Date    NITRU POSITIVE 05/03/2017    COLORU YELLOW 05/03/2017    PHUR 5.5 05/03/2017    WBCUA 5 TO 10 05/03/2017    RBCUA 5 TO 10 05/03/2017    MUCUS NOT REPORTED 05/03/2017    TRICHOMONAS NOT REPORTED 05/03/2017    YEAST NOT REPORTED 05/03/2017    BACTERIA MANY 05/03/2017    SPECGRAV 1.018 05/03/2017    LEUKOCYTESUR TRACE 05/03/2017    UROBILINOGEN Normal 05/03/2017    BILIRUBINUR NEGATIVE 05/03/2017    BILIRUBINUR NEGATIVE 03/01/2012    GLUCOSEU NEGATIVE 05/03/2017    GLUCOSEU NEGATIVE 03/01/2012    KETUA TRACE 05/03/2017    AMORPHOUS NOT REPORTED 05/03/2017         Radiology:  Reviewed as available. Assessment:  1. HORTENCIA from pre renal from dehydration poor intake and diarrhea. 2. CKD Stage 3 with baseline Cr 1.5-2. From chronic interstitial nephritis  3. Severe metabolic acidosis, from low flow and HORTENCIA, starvation ketosis, lactic acid level normal so metformin toxicity less likely, need to rule out other causes  3. APC S/P Hemicolectomy oct 2016 with chr diarrhea  4. Chronic severe malnutrition  5. 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 Hypertension  7. Iron deficiency anemia  8. High anion Gap metabolic acidosis from starvation ketosis  9. Grade 1 diastolic dysfunction       Plan:  1. Will Check Renal Ultrasound to r/o element of obstruction and to assess the kidney size/echotexture.   2. Comprehensive urine

## 2017-07-21 NOTE — CARE COORDINATION
Case Management Initial Discharge Plan  Pasha Goncalves,         Readmission Risk              Readmission Risk:        21       Age 74 or Greater:  1    Admitted from SNF or Requires Paid or Family Care:  0    Currently has CHF,COPD,ARF,CRI,or is on dialysis:  0    Takes more than 5 Prescription Medications:  4    Takes Digoxin,Insulin,Anticoagulants,Narcotics or ASA/Plavix:  [address] in Past 12 Months:  10    On Disability:  0    Patient Considers own Health:  5            Met with:patient to discuss discharge plans.    Information verified: address, contacts, phone number, , insurance Yes  PCP: Butch Robles MD  Date of last visit: 2017    Insurance Provider: [facility] Medicare    Discharge Planning  Current Residence:  Private Residence  Living Arrangements:  Friends   Home has 1 stories/few stairs to climb  Support Systems:  Family Members  Current Services PTA:  Home Care Supplier: [facility]  Patient able to perform ADL's:Assisted  DME used to aid ambulation prior to admission:  walker/during admission walker    Potential Assistance Needed:  N/A    Pharmacy: [facility]   Potential Assistance Purchasing Medications:  No  Does patient want to participate in local refill/ meds to beds program?  Yes    Patient agreeable to home care: Yes  Freedom of choice provided:  yes      Type of Home Care Services:  PT, Nursing Services  Patient expects to be discharged to:  home vs snf    Prior SNF/Rehab Placement and Facility:   Agreeable to SNF/Rehab: No  [place] of choice provided: n/a   Evaluation: no    Expected Discharge date:  17  Follow Up Appointment: Best Day/ Time:      Transportation provider:  friends  Transportation arrangements needed for discharge: Yes    Discharge Plan: home with current services from [employer] signed by Anderson RN on 17 at 9:04 AM

## 2017-07-21 NOTE — PROGRESS NOTES
Dr. Carole Valdes informed of patients poor vasculature and that Picc team was addiment that these two Periferal IV were her only available access and beyond this would need PICC and central line. Writer asked the resident if the attending thought a PICC would be appropriate due to poor access and inability to draw labs. It was decided to [facility] access for now and central line if needed. Dr. Jacqueline Stoner was later informed via perfect serve that patient had lost one of her IVs and that we were giving IV potassium now and Bicarb as soon as it was done but if this line goes she will have no IV access, no response. Dr. Farooq Hansen with nephrology saw patient and stated that it was critical that this patient had proper IV after he was informed that patient only had one IV in at this time. He wanted a PICC or central line to be placed and if need be send patient to ICU to get what she needs. Dr Jacqueline Stoner was perfect served that patient was going to need a PICC or central line, no response. After contacting PICC team they were not able to place a PICC in the time allotted so we would need to consult for a central line. Dr Jacqueline Stoner was perfect served to inform him that PICC was no longer possible and that she would need a central line placed, if they will not do it on a med/surg unit then she may need to be moved to ICU, no response. Dr. Jacqueline Stoner paged, no response. Perfect serve changed to Dr. Zachariah Haynes, she was informed of the patients needs and a critical care consult was placed for central line placement. Dr Zachariah Haynes and Dr Soo Ansari with critical care spoke with each other and an attempt will be made to have general surgery place the central line. Night nurse has spoken with Dr. Cesario Osborn and the decision to transfer patient to stepdown unit has been made, will be transferred to [id number]. Report called to stepdown nurse, informed that central line has not been placed but from my understanding general surgery should be placing it.

## 2017-07-21 NOTE — CONSULTS
ALONE. MULTIPLE BIOPSIES WERE TAKEN AND PICTURES WERE TAKEN.: HEPATIC FLEXURE A LARGE LIPOMA WAS ALSO NOTED    HEMICOLECTOMY  10/20/2016    open    HYSTERECTOMY      TUMOR REMOVAL      Lt. face close to ear       Medications Prior to Admission:   Prescriptions Prior to Admission: metFORMIN (GLUCOPHAGE) 500 MG tablet, Take 500 mg by mouth 2 times daily (with meals)  potassium chloride (KLOR-CON M) 20 MEQ extended release tablet, Take 1 tablet by mouth daily  albuterol (PROVENTIL) (2.5 MG/3ML) 0.083% nebulizer solution, Take 3 mLs by nebulization every 6 hours as needed for Wheezing  sertraline (ZOLOFT) 100 MG tablet,   BREO ELLIPTA 100-25 MCG/INH AEPB inhaler,   aspirin 81 MG tablet, Take 81 mg by mouth  ferrous sulfate 325 (65 Fe) MG tablet, Take 1 tablet by mouth 3 times daily (with meals)  Lactobacillus (PROBIOTIC ACIDOPHILUS) TABS, Take 1 tablet by mouth 3 times daily  ergocalciferol (DRISDOL) 20874 units capsule, Take 1 capsule by mouth once a week    Allergies:  Review of patient's allergies indicates no known allergies. Social History:   Social History     Social History    Marital status:      Spouse name: N/A    Number of children: 0    Years of education: N/A     Occupational History    Not on file.      Social History Main Topics    Smoking status: Never Smoker    Smokeless tobacco: Not on file    Alcohol use No    Drug use: No    Sexual activity: Not on file     Other Topics Concern    Not on file     Social History Narrative       Family History:       Problem Relation Age of Onset    Ovarian Cancer Mother     No Known Problems Father     Heart Disease Sister     Heart Surgery Sister     Heart Surgery Brother     No Known Problems Maternal Grandmother     No Known Problems Maternal Grandfather     No Known Problems Paternal Grandmother     No Known Problems Paternal Grandfather     No Known Problems Sister        REVIEW OF SYSTEMS:    CONSTITUTIONAL: admits to weight loss and fatigue denies fevers, chills. HEENT: Denies rhinorrhea, dysphagia, odynphagia. CARDIOVASCULAR: Denies history of MI, recent chest pain. RESPIRATORY: Denies recent history of shortness of breath or history of PE. GASTROINTESTINAL: fecal incontinence, decreased appetite, no current nausea or emesis at this time, intermittent abdominal discomfort associated with BMs/gas  GENITOURINARY: Denies increased frequency or dysuria. HEMATOLOGIC/LYMPHATIC: Denies history of anemia or DVTs. NEURO: Denies history of CVA, TIA. Review of systems negative unless listed above. PHYSICAL EXAM:    VITALS:  BP (!) 104/51  Pulse 99  Temp 97.8 °F (36.6 °C) (Oral)   Resp 16  Wt 142 lb (64.4 kg)  SpO2 100%  BMI 24.24 kg/m2  INTAKE/OUTPUT:   No intake or output data in the 24 hours ending 07/20/17 2151    CONSTITUTIONAL:  awake, alert, not distressed and mildly obese  HEENT: Normocephalic/atraumatic, without obvious abnormality. NO NGT  NECK:  Supple, symmetrical, trachea midline   CARDIOVASCULAR: Regular rate and rhythm without murmurs. LUNGS: Clear to auscultation bilaterally without evidence of wheezing or tachypnea. ABDOMEN: soft, non tender, non distended, BS x4, no peritoneal signs    MUSCULOSKELETAL: Muscle strength intact in all extremities bilaterally. NEUROLOGIC: CN II- XII intact. Gross motor intact without focal weakness. SKIN: No cyanosis, rashes, or edema noted.   Orientation:   oriented to person, place, and time      CBC with Differential:    Lab Results   Component Value Date    WBC 7.6 06/19/2017    RBC 3.25 06/19/2017    HGB 9.1 06/19/2017    HCT 28.2 06/19/2017     06/19/2017    MCV 89.0 05/27/2017    MCH 27.9 05/27/2017    MCHC 31.4 05/27/2017    RDW 16.0 05/27/2017    METASPCT 2 10/31/2016    LYMPHOPCT 19 03/14/2017    MONOPCT 2 03/14/2017    BASOPCT 0 03/14/2017    MONOSABS 0.25 03/14/2017    LYMPHSABS 2.36 03/14/2017    EOSABS 0.12 03/14/2017    BASOSABS 0.00 03/14/2017    DIFFTYPE NOT REPORTED 03/14/2017     CMP:    Lab Results   Component Value Date     07/20/2017    K 2.4 07/20/2017     07/20/2017    CO2 8 07/20/2017    BUN 18 07/20/2017    CREATININE 3.18 07/20/2017    GFRAA 17 07/20/2017    LABGLOM 14 07/20/2017    GLUCOSE 84 07/20/2017    PROT 5.6 04/05/2017    LABALBU 3.8 07/20/2017    CALCIUM 9.9 07/20/2017    BILITOT 0.28 04/05/2017    ALKPHOS 127 04/05/2017    AST 11 04/05/2017    ALT 8 04/05/2017     Sodium:    Lab Results   Component Value Date     07/20/2017     Potassium:    Lab Results   Component Value Date    K 2.4 07/20/2017     BUN/Creatinine:    Lab Results   Component Value Date    BUN 18 07/20/2017    CREATININE 3.18 07/20/2017     Albumin:    Lab Results   Component Value Date    LABALBU 3.8 07/20/2017     Calcium:    Lab Results   Component Value Date    CALCIUM 9.9 07/20/2017     Ionized Calcium:  No results found for: IONCA  Magnesium:    Lab Results   Component Value Date    MG 1.0 05/27/2017     Phosphorus:    Lab Results   Component Value Date    PHOS 2.7 01/19/2017     LDH:  No results found for: LDH  PT/INR:    Lab Results   Component Value Date    PROTIME 11.7 01/17/2017    INR 1.1 01/17/2017     Troponin:    Lab Results   Component Value Date    TROPONINI <0.01 06/10/2013     U/A:    Lab Results   Component Value Date    COLORU YELLOW 05/03/2017    PHUR 5.5 05/03/2017    WBCUA 5 TO 10 05/03/2017    RBCUA 5 TO 10 05/03/2017    MUCUS NOT REPORTED 05/03/2017    TRICHOMONAS NOT REPORTED 05/03/2017    YEAST NOT REPORTED 05/03/2017    BACTERIA MANY 05/03/2017    SPECGRAV 1.018 05/03/2017    LEUKOCYTESUR TRACE 05/03/2017    UROBILINOGEN Normal 05/03/2017    BILIRUBINUR NEGATIVE 05/03/2017    BILIRUBINUR NEGATIVE 03/01/2012    GLUCOSEU NEGATIVE 05/03/2017    GLUCOSEU NEGATIVE 03/01/2012    AMORPHOUS NOT REPORTED 05/03/2017     VITAMIN B12: No components found for: B12  FOLATE:    Lab Results   Component Value Date    FOLATE >20.0 04/05/2017     IRON: Lab Results   Component Value Date    IRON 41 04/05/2017     FERRITIN:    Lab Results   Component Value Date    FERRITIN 973 04/05/2017     AMYLASE:  No results found for: AMYLASE  LIPASE:    Lab Results   Component Value Date    LIPASE 361 01/18/2017     24 Hour Urine for Creatinine Clearance:  No components found for: Stacy Barcenas, UTV10    Pertinent Radiology:   No results found. 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 Fecal incontinence- Hypokalemia   2. HORTENCIA with Hx of CKD stage IV- Cr 3.18 (baseline Cr 2.08)  3. Decreased appetite    4. Fatigue and weakness    Plan:  1. Continue medical mgmt and supportive care per primary  2. Recommend dietitian consult     3. No indication at this time for feeding tube/peg    -pt is tolerating PO intake of liquids and solids   -Nutritional markers improved since last admission: current  Albumin 3.8, prealbumin 29.5, Vit-B12 652 (previous from 05/01/17 Alb 2.5, PreAlb 17.1)    -denies any dysphagia, difficulty with mastication or swallowing   -reports remote episodes of emesis, once after protein shake over the weekend and an episode today   -admits to decreased appetite over past 3 weeks but states she still is eating- recommend appetite stimulant   -admits to chronic fecal incontinence/diarrhea- likely etiology of Hypokalemia   -recommend PO administration of potassium      -s/p R hemicolectomy 10/2016: after workup for abdominal pain and stool incontinence that led to an inceidental finding of a villous cecal polyp after a C-scope per GI which resulted in above surgery    -Pt admits to sensation and awareness when BM is coming   -denies any urinary incontinence   4.  Bowel regimen: recommend Imodium 4x daily with additional 2 tablets if

## 2017-07-21 NOTE — PROGRESS NOTES
General Surgery:  Daily Progress Note                  PATIENT NAME: King Rivera     TODAY'S DATE: 7/21/2017, 6:33 AM  CC:  Decreased appetite, fecal incontinence    SUBJECTIVE:     Pt seen and examined at bedside. No acute events overnight. Patient denies any pain, fever, chills, nausea, vomiting, chest pain, and SOB. States that she has a lack of appetite. OBJECTIVE:   VITALS:  BP (!) 104/51  Pulse 99  Temp 97.8 °F (36.6 °C) (Oral)   Resp 16  Ht 5' 4\" (1.626 m)  Wt 142 lb (64.4 kg)  SpO2 100%  BMI 24.37 kg/m2     INTAKE/OUTPUT:    No intake or output data in the 24 hours ending 07/21/17 6029    PHYSICAL EXAM:  General Appearance:  awake, alert, oriented, in no acute distress  HEENT:  Normocephalic, atraumatic  Lungs:  Chest wall: symmetric  Abdomen:  soft, ND, NTTP    Data:  CBC:   Lab Results   Component Value Date    WBC 7.6 06/19/2017    RBC 3.25 06/19/2017    HGB 9.1 06/19/2017    HCT 28.2 06/19/2017    MCV 89.0 05/27/2017    MCH 27.9 05/27/2017    MCHC 31.4 05/27/2017    RDW 16.0 05/27/2017     06/19/2017    MPV 8.3 05/27/2017       Lab Results   Component Value Date     07/20/2017    K 2.4 (LL) 07/20/2017     07/20/2017    CO2 8 (LL) 07/20/2017    BUN 18 07/20/2017    CREATININE 3.18 (H) 07/20/2017    GLUCOSE 84 07/20/2017    CALCIUM 9.9 07/20/2017    PROT 5.6 (L) 04/05/2017    LABALBU 3.8 07/20/2017    BILITOT 0.28 (L) 04/05/2017    ALKPHOS 127 (H) 04/05/2017    AST 11 04/05/2017    ALT 8 04/05/2017    LABGLOM 14 (L) 07/20/2017    GFRAA 17 (L) 07/20/2017    GLOB NOT REPORTED 01/21/2017           ASSESSMENT:  Active Hospital Problems    Diagnosis Date Noted    Malnutrition ([facility] [address] [E46] 07/20/2017    [facility] [N17.9, N18.9] 07/20/2017    Hypokalemia [E87.6] 12/06/2016    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 female with fecal incontinence, hypokalemia and poor oral intake    Plan:  1.  Continue medical mgmt and supportive care as per primary  2. Recommend dietitian consult  3. Follow Hgb and Iron studies for weakness and fatigue. 5. Diet: renal  6. DVT prophylaxis, SCDs while in bed  7. Activity as tolerated    No indications for PEG tube. Pt has adequate albumin/prealbumin levels, no issues with ability to take oral nutrition. Would consider appetite stimulant such as Megace to increase oral intake if the patient seems disinterested in food, will await dietitian evaluation. May consider evaluation for depression as a possible contributing factor for loss of appetite.     Electronically signed by Josep Contreras DO on 7/21/2017 at 8:33 AM

## 2017-07-21 NOTE — PROGRESS NOTES
Nutrition Assessment    Type and Reason for Visit: Initial, Consult (Unplanned Weight Loss)    Nutrition Recommendations: Liberalize diet to general d/t hypokalemia and history of poor PO intake/weight loss. Send Ensure Clear ONS with all meals and monitor tolerance to ONS. ? Need for appetite stimulant. If pt unable to consume/tolerate PO, consider use of nutrition support (parenteral nutrition) to meet estimated needs. Malnutrition Assessment:  · Malnutrition Status: Meets the criteria for severe malnutrition  · Context: Chronic illness  · Findings of the 6 clinical characteristics of malnutrition (Minimum of 2 out of 6 clinical characteristics is required to make the diagnosis of moderate or severe Protein Calorie Malnutrition based on AND/ASPEN Guidelines):  1. Energy Intake-Less than or equal to 75%, greater than or equal to 3 months    2. Weight Loss-20% loss or greater,  (9 months)  3. Fat Loss-Mild subcutaneous fat loss, Orbital, Triceps  4. Muscle Loss-Moderate muscle mass loss, Temples (temporalis muscle), Clavicles (pectoralis and deltoids)  5. Fluid Accumulation-No significant fluid accumulation,    6.  Strength-Not measured    Nutrition Diagnosis:   · Problem: Inadequate oral intake  · Etiology: related to Alteration in GI function (Variable/poor appetite)     Signs and symptoms:  as evidenced by Diet history of poor intake (45% weight loss x 9 months)    Nutrition Assessment:  · Subjective Assessment: Pt has lost 118 lbs (45% UBW) since hemicolectomy 9 months ago per EHR weight history/pt reports. Pt reports variable appetite since sx, but further decreased with intermittent N/V with PO intake past 3 weeks. Denies any PO intake yesterday. Awaiting first meal now (ordered cereal, milk, and orange juice). Pt has chronic diarrhea for which she takes Imodium for. Pt states she has tried nutritional/protein shakes in the past (Muscle Milk) and did not tolerate (vomiting).    · Wound Type: None  · Current Nutrition Therapies:  · Oral Diet Orders: Renal   · Oral Diet intake: Unable to assess (Has not eaten yet today)  · Oral Nutrition Supplement (ONS) Orders: None  · Anthropometric Measures:  · Ht: 5' 4\" (162.6 cm)   · Admission Body Wt: 141 lb 15.6 oz (64.4 kg)  · Usual Body Wt: 260 lb (117.9 kg) (10/20/2017)  · % Weight Change: 45%,  9 months  · Ideal Body Wt: 120 lb (54.4 kg), % Ideal Body 119%  · BMI Classification: BMI 18.5 - 24.9 Normal Weight  · Comparative Standards (Estimated Nutrition Needs):  · Estimated Daily Total Kcal: 8873-4442 kcals/day  · Estimated Daily Protein (g): 50-65 g/day    Estimated Intake vs Estimated Needs: Intake Less Than Needs    Nutrition Risk Level: High    Nutrition Interventions:   Modify current diet, Start ONS  Continued Inpatient Monitoring, Education Not Indicated    Nutrition Evaluation:   · Evaluation: Goals set   · Goals: Meet greater than 75% of estimated nutrient needs. · Monitoring: Meal Intake, Supplement Intake, Diet Tolerance, Nausea or Vomiting, Diarrhea, Weight, Comparative Standards, Pertinent Labs    See Adult Nutrition Doc Flowsheet for more detail.      Electronically signed by Jess Geller MS, RD, LD on 7/21/17 at 9:44 AM    Contact Number: [phone]

## 2017-07-21 NOTE — PROGRESS NOTES
Tova Gloria MD   Stopped at 07/20/17 2138    potassium chloride 20 MEQ/15ML (10%) oral solution 40 mEq  40 mEq Oral BID Luanne Garibay MD         No Known Allergies  Principal Problem:    Malnutrition ([address]  Active Problems:    Tubulovillous adenoma of colon    Hypokalemia    Acute on chronic kidney failure (HCC)    Blood pressure 111/72, pulse 81, temperature 97.6 °F (36.4 °C), temperature source Oral, resp. rate 16, height 5' 4\" (1.626 m), weight 142 lb (64.4 kg), SpO2 100 %, not currently breastfeeding. Subjective:  Symptoms:  Stable. No chest pain. Diet:  Adequate intake. Activity level: Impaired due to weakness. Pain:  She reports no pain. Objective:  General Appearance:  Comfortable. Vital signs: (most recent): Blood pressure 111/72, pulse 81, temperature 97.6 °F (36.4 °C), temperature source Oral, resp. rate 16, height 5' 4\" (1.626 m), weight 142 lb (64.4 kg), SpO2 100 %, not currently breastfeeding. Vital signs are normal.    Output: Producing urine. HEENT: Normal HEENT exam.    Lungs:  Normal respiratory rate and normal effort. Heart: Normal rate. Regular rhythm. Extremities: Normal range of motion. Neurological: Patient is alert. Skin:  Warm and dry. Abdomen: Abdomen is soft. Bowel sounds are normal.   There is no abdominal tenderness. Pupils:  Pupils are equal, round, and reactive to light. Pulses: Distal pulses are intact. Assessment:    Condition: In stable condition. Improving. (S/P right hemicolectomy  Chronic kidney disease, stage III (moderate)  Debilitated patient  Tubulovillous adenoma of colon  Hypokalemia  Malnutrition (HCC)  Acute on chronic kidney failure (HCC)  Anemia of chronic illness  ). Plan:   Per physical therapy. Consults: Nephro. Advance diet as tolerated. Administer medications as ordered.    (Monitor H&H, electrolytes, serial replacement as indicated  Nephrol consult  Surgical consult  Soc Services to follow regarding placement  Attending Physician Statement  I have discussed the care of Rao Seals including pertinent history and exam findings,  with the resident. I have seen and examined the patient and the key elements of all parts of the encounter have been performed by me. I agree with the assessment, plan and orders as documented by the resident. (GC Modifier)).        Davis Jordan DO  7/21/2017

## 2017-07-21 NOTE — PROGRESS NOTES
Writer spoke to Dr. Moises Ferrer regarding patient's acuity and needs a higher level of care that we can not provide on this unit. Dr. Moises Ferrer spoke with attending and called back stating he will put in stepdown order for patient. HUB notified.     Electronically signed by Dacia Rollins RN on 7/21/17 at 7:24 PM

## 2017-07-21 NOTE — PROGRESS NOTES
Smoking Cessation - topics covered   []  Health Risks  []  Benefits of Quitting   []  Smoking Cessation  [x]  Patient has no history of tobacco use  []  Patient is former smoker. [x]  No need for tobacco cessation education. []  Booklet given  []  Patient verbalizes understanding. []  Patient denies need for tobacco cessation education.   Ted Chun  8:18 AM

## 2017-07-21 NOTE — PLAN OF CARE
Problem: Nutrition  Goal: Optimal nutrition therapy  Outcome: Ongoing  Nutrition Problem: Inadequate oral intake  Intervention: Food and/or Nutrient Delivery: Modify current diet, Start ONS  Nutritional Goals: Meet greater than 75% of estimated nutrient needs.

## 2017-07-21 NOTE — CONSULTS
INITIAL CONSULTATION     HISTORY OF PRESENT ILLNESS: Matheus Barba is a 78 y.o. female with a past history remarkable for Adenomatous polyposis s/p polypectomy, DM2, HTN, and CKD. She was admitted to the hospital on 7/20/2017 for work up of weight loss/malnutrition/decreased po intake and management of hypokalemia and HORTENCIA. She has had a 90lb documented weight loss and hypokalemia in the setting of decreased PO intake. She states having no appetite and when she does eat she becomes nauseous and vomits. Jaon Mike has a history of chronic diarrhea since her bowel surgery.        PAST MEDICAL HISTORY:     Past Medical History:   Diagnosis Date    Adenomatous polyp 08/31/2016    H[facility]ENCIA (acute kidney injury) ([facility] 12/6/2016    DM (diabetes mellitus) ([facility]     On Metformin    Hypercholesteremia     Hypertension     ON Amlodipine, Cozaar, Hydrochlorthiazide    Internal hemorrhoids     Lipoma 08/31/2016    Obesity     Pneumonia 03/17/2015    [facility]    Use of cane as ambulatory aid     as needed    Wears glasses         Past Surgical History:   Procedure Laterality Date    ABDOMINAL ADHESION SURGERY  10/20/2016    robotic assisted laparascopic    CHOLECYSTECTOMY      COLONOSCOPY  08/31/2016    adenomatous polyp; IN THE DISTAL ASCENDING COLON AREA WITH LITTLE LOOPING OF THE SCOPE THERE A A LARGE ( ABOUT 6-7 CM ) FLAT POLYPOID LESION NOTED OVER A FOLD IT WAS VILLOUS APPEARING AND IS NOT AMENABLE TO BE REMOVED COMPLETELY BY ENDOSCOPY ALONE.  MULTIPLE BIOPSIES WERE TAKEN AND PICTURES WERE TAKEN.: HEPATIC FLEXURE A LARGE LIPOMA WAS ALSO NOTED    HEMICOLECTOMY  10/20/2016    open    HYSTERECTOMY      TUMOR REMOVAL      Lt. face close to ear          CURRENT MEDICATIONS:       Current Facility-Administered Medications:     loperamide (IMODIUM) 1 MG/5ML solution 4 mg, 4 mg, Oral, 4x Daily PRN, Emilia Westbroko MD    potassium chloride 20 mEq, sodium bicarbonate 150 mEq in dextrose 5 % and 0.45 % NaCl 1,000 mL infusion, , Intravenous, Continuous, Leopoldo Guerra MD, Last Rate: 100 mL/hr at 07/21/17 1428    albuterol (PROVENTIL) nebulizer solution 2.5 mg, 2.5 mg, Nebulization, Q6H PRN, Mario Alberto Yip MD  [facility]  [START ON 7/24/2017] vitamin D (ERGOCALCIFEROL) capsule 50,000 Units, 50,000 Units, Oral, Weekly, Mario Alberto Yip MD    ferrous sulfate EC tablet 325 mg, 325 mg, Oral, TID WC, Mario Alberto Yip MD, 325 mg at 07/21/17 1155    sodium chloride flush 0.9 % injection 10 mL, 10 mL, Intravenous, 2 times per day, Mario Alberto Yip MD    sodium chloride flush 0.9 % injection 10 mL, 10 mL, Intravenous, PRN, Mario Alebrto Yip MD    potassium chloride (KLOR-CON M) extended release tablet 40 mEq, 40 mEq, Oral, PRN **OR** potassium chloride 20 MEQ/15ML (10%) oral solution 40 mEq, 40 mEq, Oral, PRN **OR** potassium chloride 10 mEq/100 mL IVPB (Peripheral Line), 10 mEq, Intravenous, PRN, Mario Alberto Yip MD, Last Rate: 100 mL/hr at 07/21/17 1415, 10 mEq at 07/21/17 1415    magnesium sulfate 1 g in dextrose 5% 100 mL IVPB, 1 g, Intravenous, PRN, Mario Alberto Yip MD, Stopped at 07/21/17 1218    acetaminophen (TYLENOL) tablet 650 mg, 650 mg, Oral, Q4H PRN, Mario Alberto Yip MD    ondansetron [facility]) injection 4 mg, 4 mg, Intravenous, Q6H PRN, Mario Alberto Yip MD, 4 mg at 07/21/17 1047    nicotine (NICODERM CQ) 21 MG/24HR 1 patch, 1 patch, Transdermal, Daily PRN, Mario Alberto Yip MD    heparin (porcine) injection 5,000 Units, 5,000 Units, Subcutaneous, Once, Mario Alberto Yip MD, Stopped at 07/20/17 2138    potassium chloride 20 MEQ/15ML (10%) oral solution 40 mEq, 40 mEq, Oral, BID, Mario Alberto Yip MD       ALLERGIES:   No Known Allergies       FAMILY HISTORY: The patient's family history was reviewed. SOCIAL HISTORY:   Social History     Social History    Marital status:      Spouse name: N/A    Number of children: 0    Years of education: N/A     Occupational History    Not on file.      Social History Main Topics    (L) 07/21/2017    MCV 86.2 07/21/2017     07/21/2017     (H) 07/21/2017    K 2.7 (LL) 07/21/2017     07/21/2017    CO2 7 (LL) 07/21/2017    BUN 19 07/21/2017    CREATININE 3.32 (H) 07/21/2017    LABALBU 3.2 (L) 07/21/2017    BILITOT 0.42 07/21/2017    ALKPHOS 110 (H) 07/21/2017    AST 15 07/21/2017    ALT 9 07/21/2017    INR 1.1 01/17/2017      Lab Results   Component Value Date    RBC 2.95 (L) 07/21/2017    HGB 8.1 (L) 07/21/2017    MCV 86.2 07/21/2017    MCH 27.4 07/21/2017    MCHC 31.8 07/21/2017    RDW 18.4 (H) 07/21/2017    MPV 8.9 07/21/2017    BASOPCT 1 07/21/2017    LYMPHSABS 1.10 07/21/2017    MONOSABS 0.50 07/21/2017    NEUTROABS 6.50 07/21/2017    EOSABS 0.10 07/21/2017    BASOSABS 0.10 07/21/2017          DIAGNOSTIC TESTING:   No results found. IMPRESSION:   Malnutrition  Diarrhea  HORTENCIA on CKD Stage IV  Hx of adenomatous polyp -CT abd and pelvis with PO and IV contrast, accordingly will consider colonoscopy and or EGD      Thank you for allowing me to participate in the care of Ms. Nikhil Dunn. For any further questions please do not hesitate to contact me.        Katheryn Cherry NP

## 2017-07-22 PROBLEM — E87.3 COMPENSATED METABOLIC ALKALOSIS: Status: ACTIVE | Noted: 2017-01-01

## 2017-07-22 PROBLEM — E87.3 COMPENSATED METABOLIC ALKALOSIS: Status: RESOLVED | Noted: 2017-01-01 | Resolved: 2017-01-01

## 2017-07-22 PROBLEM — D63.8 ANEMIA OF CHRONIC DISEASE: Status: ACTIVE | Noted: 2017-01-01

## 2017-07-22 PROBLEM — E87.20 METABOLIC ACIDOSIS: Status: ACTIVE | Noted: 2017-01-01

## 2017-07-22 NOTE — CONSULTS
Inpatient consult to Hem/Onc  Consult performed by: Ladonna FERRELL  Consult ordered by: Annabella Her  Reason for consult: anemia   Assessment/Recommendations: Thank you for the consult  Pt seen and examined, full consult to follow   Pt anemia is likely multifactorial  Anemia of renal disease, and anemia of chronic illness are likely contributing  Check Erythropoietin. High ferritin is due to chronic illness. Plan likely STEPHEN as outpatient depending on renal recovery                                                                                                     Today's Date: 7/23/2017  Patient Name: Zahra Yancey  Date of admission: 7/20/2017  6:52 PM  Patient's age: 78 y.o., 1940  Admission Dx: Malnutrition ([facility] [address] Jefe Garcia      Requesting Physician: Benigno Ram MD    CHIEF COMPLAINT:  Anemia    History Obtained From:  patient, electronic medical record    HISTORY OF PRESENT ILLNESS:      The patient is a 78 y.o.  female who is admitted to the hospital for weight loss, renal impairment. The patient is evaluated by nephrology, she has HORTENCIA on CKD3   We are asked to see her because of anemia. Patient is seen and evaluated. She overall feels much better. There is significant improvement in her overall function. She has diabetes, hypertension and evidence of diastolic cardiac dysfunction. There are significant weight loss in the previous few months. Workup done during hospitalization including elevated ferritin, normal H-27 and folic acid. Past Medical History:   has a past medical history of Adenomatous polyp; HORTENCIA (acute kidney injury) ([facility] [address] DM (diabetes mellitus) ([facility] [address] Hypercholesteremia; Hypertension; Internal hemorrhoids; Lipoma; Obesity; Pneumonia; Use of cane as ambulatory aid; and Wears glasses. Past Surgical History:   has a past surgical history that includes Hysterectomy; Colonoscopy (08/31/2016);  Cholecystectomy; tumor removal; hemicolectomy (10/20/2016); and 07/23/17 1138 200 mg at 07/23/17 1138    sodium chloride flush 0.9 % injection 10 mL  10 mL Intravenous Q12H Sudhakar Squires MD   10 mL at 07/23/17 0142    sodium chloride flush 0.9 % injection 10 mL  10 mL Intravenous PRN Sudhakar Squires MD        diphenoxylate-atropine (LOMOTIL) 2.5-0.025 MG per tablet 1 tablet  1 tablet Oral 4x Daily PRN Gina Cameron MD   1 tablet at 07/22/17 1449    potassium chloride 20 mEq, sodium bicarbonate 150 mEq in dextrose 5 % 1,000 mL infusion   Intravenous Continuous Megan Salinas  mL/hr at 07/23/17 0139      loperamide (IMODIUM) capsule 2 mg  2 mg Oral TID PRN Kapil Vargas MD        sodium phosphate 10.29 mmol in dextrose 5 % 250 mL IVPB  0.16 mmol/kg Intravenous PRN Megan Salinas MD   Stopped at 07/23/17 0127    Or    sodium phosphate 20.61 mmol in dextrose 5 % 250 mL IVPB  0.32 mmol/kg Intravenous PRN Megan Salinas MD        lidocaine PF 1 % injection 5 mL  5 mL Intradermal Once Megan Salinas MD        sodium chloride flush 0.9 % injection 10 mL  10 mL Intravenous 2 times per day Megan Salinas MD   10 mL at 07/22/17 2126    sodium chloride flush 0.9 % injection 10 mL  10 mL Intravenous PRN Shasha Espinal MD        midodrine (PROAMATINE) tablet 5 mg  5 mg Oral TID WC Megan Salinas MD   5 mg at 07/23/17 0261    rifaximin (XIFAXAN) tablet 550 mg  550 mg Oral TID Adolfo Charles MD   550 mg at 07/22/17 1234    potassium chloride 20 MEQ/15ML (10%) oral solution 40 mEq  40 mEq Oral Once Maria Victoria Harrison MD        glucose (GLUTOSE) 40 % oral gel 15 g  15 g Oral PRN Sudhakar Squires MD        dextrose 50 % solution 12.5 g  12.5 g Intravenous PRN Sudhakar Squires MD   12.5 g at 07/22/17 0537    glucagon (rDNA) injection 1 mg  1 mg Intramuscular PRN Sudhakar Squires MD        dextrose 5 % solution  100 mL/hr Intravenous PRN Sudhakar Squires MD        albuterol (PROVENTIL) nebulizer solution 2.5 mg  2.5 mg Nebulization Q6H PRN Maria Victoria Harrison MD       [facility] Labs:       CBC:   Recent Labs      07/21/17   0651   07/22/17   1240   07/22/17   2333  07/23/17   0627   WBC  8.1   --   9.1   --    --    --    HGB  8.1*   < >  8.6*   < >  7.5*  7.7*   HCT  25.4*   < >  25.5*   < >  22.3*  22.8*   PLT  225   --   210   --    --    --     < > = values in this interval not displayed. BMP:   Recent Labs      07/22/17   0251  07/22/17   1240   NA  138  138   K  2.7*  4.8   CO2  14*  16*   BUN  17  15   CREATININE  2.75*  2.42*   LABGLOM  17*  19*   GLUCOSE  74  97     PT/INR: No results for input(s): PROTIME, INR in the last 72 hours. APTT:No results for input(s): APTT in the last 72 hours. LIVER PROFILE:  Recent Labs      07/21/17   0651   AST  15   ALT  9   LABALBU  3.2*               IMPRESSION:    Primary Problem  Malnutrition ([facility]    Active Hospital Problems    Diagnosis Date Noted    Anemia of chronic disease [D63.8] 07/22/2017    Weight loss [R63.4]     Malnutrition ([facility] [address] [E46] 07/20/2017    Acute on chronic kidney failure ([facility] [N17.9, N18.9] 07/20/2017    Chronic kidney disease (CKD), stage IV (severe) ([facility] [N18.4] 06/13/2017    Hypokalemia [E87.6] 12/06/2016    S/P right hemicolectomy [Z90.49] 12/06/2016       RECOMMENDATIONS:  1. The patient's anemia is likely multifactorial, anemia of chronic illness and anemia of renal disease are the contributing factor. There is no evidence of iron deficiency or vitamin deficiency. Serum light chains have actually improved significantly since January. And previous immunofixation was negative. 2. We will check the patient erythropoietin. 3. I think she will qualify for STEPHEN therapy as an outpatient and we will be happy to administer that in our office if needed  4. We will follow with you, and we will arrange for follow-up as an outpatient for STEPHEN if needed      Discussed with patient and Nurse.     Tamika Villa MD   [phone]

## 2017-07-22 NOTE — PROGRESS NOTES
Consulted Gen surgery team and ICU team requesting a central line as per nephrology patient need fluids and potassium replacement. Last potassium 2.7 brought up from 2.4 after 80mEQ oral potassium replacement. Per the nurse patient doesn't tolerate orals. Questionable if picc team available on weekends. Talked to gen surgery and expressed concern . They recommends a Picc team consult for the morning.

## 2017-07-22 NOTE — PROGRESS NOTES
Pt BS 68 pt asymptomatic pt given orange juice and peanut butter crackers. 0830 pt bs now 78 Pt continuing to drink Orange juice and breakfast ordered.

## 2017-07-22 NOTE — PROGRESS NOTES
Discussed during rounds. No indications for PEG tube placement at this time, pt is clear she does not want any additional surgery done either. Continue supportive care per primary, will sign off at this time.     Electronically signed by Nicolle Maza DO on 7/22/2017 at 1:22 AM

## 2017-07-22 NOTE — PROGRESS NOTES
5 mL Once   sodium chloride flush 0.9 % injection 10 mL 2 times per day   sodium chloride flush 0.9 % injection 10 mL PRN   midodrine (PROAMATINE) tablet 5 mg TID WC   rifaximin (XIFAXAN) tablet 550 mg TID   potassium chloride 20 MEQ/15ML (10%) oral solution 40 mEq Once   glucose (GLUTOSE) 40 % oral gel 15 g PRN   dextrose 50 % solution 12.5 g PRN   glucagon (rDNA) injection 1 mg PRN   dextrose 5 % solution PRN   albuterol (PROVENTIL) nebulizer solution 2.5 mg Q6H PRN   [START ON 7/24/2017] vitamin D (ERGOCALCIFEROL) capsule 50,000 Units Weekly   ferrous sulfate EC tablet 325 mg TID WC   sodium chloride flush 0.9 % injection 10 mL 2 times per day   sodium chloride flush 0.9 % injection 10 mL PRN   potassium chloride (KLOR-CON M) extended release tablet 40 mEq PRN   Or    potassium chloride 20 MEQ/15ML (10%) oral solution 40 mEq PRN   Or    potassium chloride 10 mEq/100 mL IVPB (Peripheral Line) PRN   magnesium sulfate 1 g in dextrose 5% 100 mL IVPB PRN   acetaminophen (TYLENOL) tablet 650 mg Q4H PRN   ondansetron (ZOFRAN) injection 4 mg Q6H PRN   nicotine (NICODERM CQ) 21 MG/24HR 1 patch Daily PRN   heparin (porcine) injection 5,000 Units Once         LABS      CBC: Recent Labs      07/21/17   0651  07/22/17   0249  07/22/17   0953   WBC  8.1   --    --    RBC  2.95*   --    --    HGB  8.1*  8.1*  8.6*   HCT  25.4*  24.7*  25.8*   MCV  86.2   --    --    MCH  27.4   --    --    MCHC  31.8   --    --    RDW  18.4*   --    --    PLT  225   --    --    MPV  8.9   --    --       BMP: Recent Labs      07/20/17   1144  07/21/17   0651  07/22/17   0251   NA  144  145*  138   K  2.4*  2.7*  2.7*   CL  105  107  101   CO2  8*  7*  14*   BUN  18  19  17   CREATININE  3.18*  3.32*  2.75*   GLUCOSE  84  61*  74   CALCIUM  9.9  9.4  9.0      BNP:No results found for: BNP  PHOSPHORUS:  No results for input(s): PHOS in the last 72 hours.   MAGNESIUM:   Recent Labs      07/21/17   0651  07/22/17   0251   MG  1.5*  1.8     ALBUMIN: Recent Labs      07/20/17   1144  07/21/17   0651   LABALBU  3.8  3.2*     IRON:    Lab Results   Component Value Date    IRON 41 04/05/2017     IRON SATURATION:  Lab Results   Component Value Date    LABIRON 25 04/05/2017     TIBC:    Lab Results   Component Value Date    TIBC 162 04/05/2017     FERRITIN:    Lab Results   Component Value Date    FERRITIN 973 04/05/2017     NATANAEL:   Lab Results   Component Value Date    NATANAEL NEGATIVE 01/18/2017       SPEP: Lab Results   Component Value Date    PROT 5.4 07/22/2017    ALBCAL Pending 07/22/2017    ALBPCT Pending 07/22/2017    LABALPH Pending 07/22/2017    LABALPH Pending 07/22/2017    A1PCT Pending 07/22/2017    A2PCT Pending 07/22/2017    LABBETA Pending 07/22/2017    BETAPCT Pending 07/22/2017    GAMGLOB Pending 07/22/2017    GGPCT Pending 07/22/2017    PATH Pending 07/22/2017     UPEP:   Lab Results   Component Value Date    TPU 14 01/18/2017    TPU 14 01/18/2017      HEPBSAG:No results found for: HEPBSAG  HEPCAB:No results found for: HEPCAB  C3:   Lab Results   Component Value Date    C3 75 (L) 07/22/2017     C4:   Lab Results   Component Value Date    C4 29 07/22/2017     MPO ANCA:   Lab Results   Component Value Date    MPO 7 01/18/2017    .   PR3 ANCA:    Lab Results   Component Value Date    PR3 6 01/18/2017     URINE SODIUM:    Lab Results   Component Value Date    WESTLEY 40 12/07/2016      URINE CREATININE:  Lab Results   Component Value Date    LABCREA 120.1 07/21/2017     URINE EOSINOPHILS: Lab Results   Component Value Date    UREO NONE SEEN 01/18/2017     URINE PROTEIN:    Lab Results   Component Value Date    TPU 14 01/18/2017    TPU 14 01/18/2017     URINALYSIS:  U/A: Lab Results   Component Value Date    NITRU POSITIVE 07/21/2017    COLORU YELLOW 07/21/2017    PHUR 6.0 07/21/2017    WBCUA 5 TO 10 07/21/2017    RBCUA 0 TO 2 07/21/2017    MUCUS NOT REPORTED 07/21/2017    TRICHOMONAS NOT REPORTED 07/21/2017    YEAST NOT REPORTED 07/21/2017    BACTERIA MANY 07/21/2017    SPECGRAV 1.013 07/21/2017    LEUKOCYTESUR TRACE 07/21/2017    UROBILINOGEN Normal 07/21/2017    BILIRUBINUR NEGATIVE 07/21/2017    BILIRUBINUR NEGATIVE 03/01/2012    GLUCOSEU NEGATIVE 07/21/2017    GLUCOSEU NEGATIVE 03/01/2012    KETUA SMALL 07/21/2017    AMORPHOUS 1+ 07/21/2017     ANTIGBM:No results found for: [address] as available. ASSESSMENT      1. HORTENCIA from pre renal from dehydration poor intake and diarrhea. 2. CKD Stage 3 with baseline Cr 1.5-2. From chronic interstitial nephritis, bilateral small kidneys  3. Severe metabolic acidosis, from low flow and HORTENCIA, starvation ketosis, lactic acid level normal so metformin toxicity less likely, need to rule out other causes  3. APC S/P Hemicolectomy oct 2016 with chr diarrhea  4. Chronic severe malnutrition  5. 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 Hypertension  7. Iron deficiency anemia  8. High anion Gap metabolic acidosis from starvation ketosis  9. Grade 1 diastolic dysfunction    PLAN      1. US reviewed  2. Serology pending  3. Strict I/O, morley, monitor UO  4. Change IVF to d5w with sodium bicarbonate with 20 meq KCL  5. Add oral kphos 500 bid  6. Replace Mag IV  5. Replace K and Mag per protocol, follow Repeat K noon after replacement  6. DC metformin outpatient and avoid it in the future. 7. Will need a long term plan to avoid this in the future. Which may mean home TPN, anti motility agents if c diff neagtive etc.  8. Morley catheter continue  9. Will add immodium 2 mg tid      Please do not hesitate to call with questions. Electronically signed by Indira Hutchins MD on 7/22/2017 at 12:54 PM    Attending Physician Statement  I have discussed the care of Sarah Murrieta, including pertinent history and exam findings with the resident/fellow. I have reviewed the key elements of all parts of the encounter with the resident/fellow. I have seen and examined the patient with the resident/fellow.   I agree with the assessment and plan and status of the problem list as documented.       .  Electronically signed by Breanna Simon MD on 7/22/2017 at 2:36 PM

## 2017-07-22 NOTE — PROGRESS NOTES
chloride 20 MEQ/15ML (10%) oral solution 40 mEq PRN   Or    potassium chloride 10 mEq/100 mL IVPB (Peripheral Line) PRN   magnesium sulfate 1 g in dextrose 5% 100 mL IVPB PRN   acetaminophen (TYLENOL) tablet 650 mg Q4H PRN   ondansetron (ZOFRAN) injection 4 mg Q6H PRN   nicotine (NICODERM CQ) 21 MG/24HR 1 patch Daily PRN   heparin (porcine) injection 5,000 Units Once       Data:     Code Status:  Full Code    Family History   Problem Relation Age of Onset    Ovarian Cancer Mother     No Known Problems Father     Heart Disease Sister     Heart Surgery Sister     Heart Surgery Brother     No Known Problems Maternal Grandmother     No Known Problems Maternal Grandfather     No Known Problems Paternal Grandmother     No Known Problems Paternal Grandfather     No Known Problems Sister        Social History     Social History    Marital status:      Spouse name: N/A    Number of children: 0    Years of education: N/A     Occupational History    Not on file. Social History Main Topics    Smoking status: Never Smoker    Smokeless tobacco: Not on file    Alcohol use No    Drug use: No    Sexual activity: Not on file     Other Topics Concern    Not on file     Social History Narrative       Vitals:  /67  Pulse 76  Temp 97.6 °F (36.4 °C) (Oral)   Resp 16  Ht 5' 4\" (1.626 m)  Wt 142 lb (64.4 kg)  SpO2 100%  BMI 24.37 kg/m2  Temp (24hrs), Av.8 °F (36.6 °C), Min:97.6 °F (36.4 °C), Max:98.2 °F (36.8 °C)    Recent Labs      17   0621  17   0731  17   0837  17   1205   POCGLU  108*  66  76  115*       I/O (24Hr):     Intake/Output Summary (Last 24 hours) at 17 1630  Last data filed at 17 1215   Gross per 24 hour   Intake             4384 ml   Output              500 ml   Net             3884 ml       Labs:      CBC: Lab Results   Component Value Date    WBC 9.1 2017    RBC 3.11 2017    HGB 8.6 2017    HCT 25.5 2017    MCV 82.2 07/22/2017    MCH 27.6 07/22/2017    MCHC 33.5 07/22/2017    RDW 17.3 07/22/2017     07/22/2017    MPV 10.0 07/22/2017     CBC with Differential:  Lab Results   Component Value Date    WBC 9.1 07/22/2017    RBC 3.11 07/22/2017    HGB 8.6 07/22/2017    HCT 25.5 07/22/2017     07/22/2017    MCV 82.2 07/22/2017    MCH 27.6 07/22/2017    MCHC 33.5 07/22/2017    RDW 17.3 07/22/2017    METASPCT 2 10/31/2016    LYMPHOPCT 13 07/21/2017    MONOPCT 6 07/21/2017    BASOPCT 1 07/21/2017    MONOSABS 0.50 07/21/2017    LYMPHSABS 1.10 07/21/2017    EOSABS 0.10 07/21/2017    BASOSABS 0.10 07/21/2017    DIFFTYPE NOT REPORTED 07/21/2017     Hemoglobin/Hematocrit:  Lab Results   Component Value Date    HGB 8.6 07/22/2017    HCT 25.5 07/22/2017     CMP:  Lab Results   Component Value Date     07/22/2017    K 4.8 07/22/2017     07/22/2017    CO2 16 07/22/2017    BUN 15 07/22/2017    CREATININE 2.42 07/22/2017    GFRAA 24 07/22/2017    LABGLOM 19 07/22/2017    GLUCOSE 97 07/22/2017    PROT 5.4 07/22/2017    LABALBU 3.2 07/21/2017    CALCIUM 8.8 07/22/2017    BILITOT 0.42 07/21/2017    ALKPHOS 110 07/21/2017    AST 15 07/21/2017    ALT 9 07/21/2017     BMP:  Lab Results   Component Value Date     07/22/2017    K 4.8 07/22/2017     07/22/2017    CO2 16 07/22/2017    BUN 15 07/22/2017    LABALBU 3.2 07/21/2017    CREATININE 2.42 07/22/2017    CALCIUM 8.8 07/22/2017    GFRAA 24 07/22/2017    LABGLOM 19 07/22/2017    GLUCOSE 97 07/22/2017     PT/INR:    Lab Results   Component Value Date    PROTIME 11.7 01/17/2017    INR 1.1 01/17/2017     PTT:    Lab Results   Component Value Date    APTT 21.2 01/17/2017   [APTT}    Physical Examination:        General appearance: alert, cooperative and no distress  Mental Status: oriented to person, place and time and anxious affect    Abdomen: soft, nontender, nondistended, bowel sounds present     Assessment:        Primary Problem  Malnutrition ([address]     Active

## 2017-07-22 NOTE — PROGRESS NOTES
Patient seen and examined. She reports feeling well and states that overall she is doing better, has no complaints at this time. Patient states that her nausea has improved and she was able to eat earlier and has not vomited today. Denies any chest pain, abdominal pain, SOB, dizziness, lightheadedness, numbness or tingling. Patient was resting comfortably in bed. Physical exam was unremarkable. Spoke with night nurse who felt that patient could not be managed in med/surg, decision made to transfer to patient to stepdown unit. Patient BS 66, hypoglycemia protocol ordered. Spoke with GS to request central line, they recommended picc line in the a.m. Advised them picc team does not work on weekends, awaiting response.

## 2017-07-22 NOTE — PLAN OF CARE
Problem: Falls - Risk of  Goal: Absence of falls  Outcome: Met This Shift  Patient assessed for fall risk and fall precautions initiated as needed. Patient and family  instructed about safety devices and allowed to make decisions related to safey. Environment kept free of clutter and adequate lighting provided. Bed in lowest position with brakes locked. Call light in reach. Patient ID band correct and in place. Patient transferred with appropriate methods. Will continue to monitor.

## 2017-07-22 NOTE — PROGRESS NOTES
07/22/2017     Phosphorus:   Lab Results   Component Value Date    PHOS 1.4 07/22/2017     Ionized Calcium:   Lab Results   Component Value Date    CAION 1.18 01/18/2017      PT/INR:    Lab Results   Component Value Date    PROTIME 11.7 01/17/2017    INR 1.1 01/17/2017     PTT:    Lab Results   Component Value Date    APTT 21.2 01/17/2017       ASSESSMENT   Principal Problem:    Malnutrition ([address]  Active Problems:    S/P right hemicolectomy    Hypokalemia    Chronic kidney disease (CKD), stage IV (severe) (HCC)    Acute on chronic kidney failure (HCC)    Weight loss    Anemia of chronic disease      PLAN       Severe Malnutrition  - Patient meets diagnostic criteria for severe calorie malnutrition per dietician (has 5/6 AND/ASPEN Guidelines)  - General surgery on board: S/P R hemicolectomy 2016  - Diet supplementation    Metabolic acidosis with anion gap and resp compensation  - likely secondary to malnutrition  - vbg  - bicarb     Diarrhea: likely functional  - Stool studies  - Imodium  - Repeat lipase  - GI consult: possible colonoscopy,EGD.  Rifaximin  - Hydration     HORTENCIA on CKD Stage IV  Severe electrolyte imbalance  - Urine studies  - Electrolyte repletion  - Baseline Cr. 2.08, today 2.75: improving  - Hydration  - Nephrology consult     Hypokalemia  - Potassium replacement per protocol  - EKG    Anemia- likely AOCD  - Iron studies in April consistent with AOCD  - Trend H/H   - reticulocytopenia: likely due to malnutrition vs BM suppression  - Heme/onc consulted: ?need for BM Bx    This plan will be discussed with the rounding attending: Dr. Alicia Case MD PGY-3  7/22/2017 3:06 PM

## 2017-07-23 PROBLEM — D63.1 ANEMIA IN CHRONIC RENAL DISEASE: Status: ACTIVE | Noted: 2017-01-01

## 2017-07-23 PROBLEM — N18.9 ANEMIA IN CHRONIC RENAL DISEASE: Status: ACTIVE | Noted: 2017-01-01

## 2017-07-23 NOTE — PLAN OF CARE
Problem: Falls - Risk of  Goal: Absence of falls  Outcome: Met This Shift  Pt remains free of falls at this time. Bed locked in lowest position, siderails x2, call light in reach. Encouraged pt to call for assistance as needed for safety. Falling star posted outside of room. Will continue to monitor. Problem: Nutrition  Goal: Optimal nutrition therapy  Outcome: Ongoing  Optimal nutrition is ongoing. Pt. Is not tolerating oral diet. TPN and Lipids ordered to be started today. Will continue to monitor. Problem: Risk for Impaired Skin Integrity  Goal: Tissue integrity - skin and mucous membranes  Structural intactness and normal physiological function of skin and  mucous membranes. Outcome: Ongoing  Pts skin maintains structural intactness and physiologic function. Pt able to reposition independently in bed/ Assisting pt with turns every 2 hours and heels elevated off bed. Linens remain clean and dry. Will continue to monitor.

## 2017-07-23 NOTE — PROGRESS NOTES
Patient seen and examined at bedside. Patient is more alert and engaged. Patient reports not being able to tolerate PO intake, has had multiple episodes of emesis today. States that she does not feel well, c/o general malaise, n/v. Denies any chest pain, SOB, abdominal pain. Physical exam is unremarkable. Zoe Viera MD  [facility] Resident  7/22/2017 9:48 P. M.

## 2017-07-23 NOTE — PROGRESS NOTES
--    --    MPV  8.9   --   10.0   --    --    --     < > = values in this interval not displayed. BMP:   Recent Labs      07/21/17   0651  07/22/17   0251  07/22/17   1240   NA  145*  138  138   K  2.7*  2.7*  4.8   CL  107  101  100   CO2  7*  14*  16*   BUN  19  17  15   CREATININE  3.32*  2.75*  2.42*   GLUCOSE  61*  74  97   CALCIUM  9.4  9.0  8.8      BNP:No results found for: BNP  PHOSPHORUS:    Recent Labs      07/22/17   1240  07/23/17   0618   PHOS  1.4*  2.3*     MAGNESIUM:   Recent Labs      07/21/17   0651  07/22/17   0251  07/22/17   1240   MG  1.5*  1.8  1.5*     ALBUMIN:   Recent Labs      07/21/17   0651   LABALBU  3.2*     IRON:    Lab Results   Component Value Date    IRON 77 07/22/2017     IRON SATURATION:    Lab Results   Component Value Date    LABIRON 45 07/22/2017     TIBC:    Lab Results   Component Value Date    TIBC 171 07/22/2017     FERRITIN:    Lab Results   Component Value Date    FERRITIN 765 07/22/2017     NATANAEL:   Lab Results   Component Value Date    NATANAEL NEGATIVE 01/18/2017       SPEP:   Lab Results   Component Value Date    PROT 5.4 07/22/2017    ALBCAL Pending 07/22/2017    ALBPCT Pending 07/22/2017    LABALPH Pending 07/22/2017    LABALPH Pending 07/22/2017    A1PCT Pending 07/22/2017    A2PCT Pending 07/22/2017    LABBETA Pending 07/22/2017    BETAPCT Pending 07/22/2017    GAMGLOB Pending 07/22/2017    GGPCT Pending 07/22/2017    PATH Pending 07/22/2017     UPEP:   Lab Results   Component Value Date    TPU 14 01/18/2017    TPU 14 01/18/2017      HEPBSAG:No results found for: HEPBSAG  HEPCAB:No results found for: HEPCAB  C3:   Lab Results   Component Value Date    C3 75 (L) 07/22/2017     C4:   Lab Results   Component Value Date    C4 29 07/22/2017     MPO ANCA:   Lab Results   Component Value Date    MPO 7 01/18/2017    .   PR3 ANCA:    Lab Results   Component Value Date    PR3 6 01/18/2017     URINE SODIUM:    Lab Results   Component Value Date    WESTLEY 40 12/07/2016 likely, need to rule out other causes  3. APC S/P Hemicolectomy oct 2016 with chr diarrhea  4. Chronic severe malnutrition  5. 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 Hypertension  7. Iron deficiency anemia  8. High anion Gap metabolic acidosis from starvation ketosis  9. Grade 1 diastolic dysfunction    PLAN      1. Serologies unremarkable. 2 Strict I/O, morley, monitor UO  3. Continue IVF to D5W with sodium bicarbonate with 20 meq KCL/@ 100/hr  4. Replace K and Mag per protocol, follow Repeat K noon after replacement  5. DC metformin outpatient and avoid it in the future. 6. Will need a long term plan to avoid this in the future. Which may mean home TPN, anti motility agents if c diff neagtive etc.  7. Morley catheter continue  8. Will add immodium 2 mg tid  9. Start TPN  10. Will follow      Please do not hesitate to call with questions. Electronically signed by CANDY Santana on 7/23/2017 at 12:10 PM    Attending Physician Statement  I have discussed the care of Med Gamino, including pertinent history and exam findings with the resident/fellow. I have reviewed the key elements of all parts of the encounter with the resident/fellow. I have seen and examined the patient with the resident/fellow. I agree with the assessment and plan and status of the problem list as documented.       .  Electronically signed by Toma Hughes MD on 7/23/2017 at 1:27 PM

## 2017-07-23 NOTE — PROGRESS NOTES
Pharmacy Note     Renal Dose Adjustment    Rosio Up is a 78 y.o. female. Pharmacist assessment of renally cleared medications. Recent Labs      07/22/17   0251  07/22/17   1240   BUN  17  15       Recent Labs      07/22/17   0251  07/22/17   1240   CREATININE  2.75*  2.42*       Estimated Creatinine Clearance: 17 mL/min (based on Cr of 2.42). Estimated CrCl using Ideal Body Weight: 17 mL/min (based on IBW 54.7 kg)    Height:   Ht Readings from Last 1 Encounters:   07/20/17 5' 4\" (1.626 m)     Weight:  Wt Readings from Last 1 Encounters:   07/20/17 142 lb (64.4 kg)       The following medication dose has been adjusted based upon renal function per P&T Guidelines:             Ciprofloxacin 200 mg IVPB q24H    Lamar Davison, Pharm. D.

## 2017-07-23 NOTE — PROGRESS NOTES
07/22/17   1240   NA  145*  138  138   K  2.7*  2.7*  4.8   CL  107  101  100   CO2  7*  14*  16*   BUN  19  17  15   CREATININE  3.32*  2.75*  2.42*   GLUCOSE  61*  74  97     Magnesium:   Lab Results   Component Value Date    MG 1.5 07/22/2017     Phosphorus:   Lab Results   Component Value Date    PHOS 2.3 07/23/2017     Ionized Calcium:   Lab Results   Component Value Date    CAION 1.18 01/18/2017      PT/INR:    Lab Results   Component Value Date    PROTIME 11.7 01/17/2017    INR 1.1 01/17/2017     PTT:    Lab Results   Component Value Date    APTT 21.2 01/17/2017       ASSESSMENT   Principal Problem:    Malnutrition ([address]  Active Problems:    S/P right hemicolectomy    Hypokalemia    Chronic kidney disease (CKD), stage IV (severe) (HCC)    Acute on chronic kidney failure (HCC)    Weight loss    Anemia of chronic disease      PLAN       Severe Malnutrition  - Patient meets diagnostic criteria for severe calorie malnutrition per dietician (has 5/6 AND/ASPEN Guidelines)  - S/P R hemicolectomy 2016  - per surgery, no indication for PEG.  Signed off at this time  - Diet supplementation    UTI  - UA positive, UCx citrobacter  - start IV cipro 689 BID    Metabolic acidosis with anion gap and resp compensation  - likely secondary to malnutrition  - bicarb trending up 14 to 16  - bicarb replaced per nephrology     Diarrhea: likely functional  - Stool studies  - Lipase elevated at 141 (145 on admission)  - CT abdomen: b/l pleural effusions, abdomen no acute changes  - GI consult: continue rifaximin  - continue Imodium  - Hydration     HORTENCIA on CKD Stage IV  Severe electrolyte imbalance  - Urine studies  - Electrolyte repletion  - Baseline Cr. 2.08, today 2.75: improving  - Hydration  - Nephrology consult     Hypokalemia/hypomagnesemia/hypophosphatemia  - K 4.8 , Mg 1.5, phos 2.3  - hypokalemia resolved, Mg, phos low  - serial labs ordered  - Mg replacement per protocol  - oral K phos 500 BID per nephrology      Anemia- likely AOCD  - Iron studies consistent with AOCD  - reticulocytopenia: likely due to malnutrition vs BM suppression  - Heme/onc consulted: ?need for BM Bx    This plan will be discussed with the rounding attending: Dr. Ibis Allan MD PGY-1  7/23/2017 10:05 AM

## 2017-07-23 NOTE — PROGRESS NOTES
GI Progress notes    7/23/2017   4:35 PM    Name:  Cheryl Abdalla  MRN:    [id number]     Dianalyside:     [de-identified]   Room:  [address] [place] [address] Day: 3     Admit Date: 7/20/2017  6:52 PM  PCP: Mason Severe, MD    Subjective:     C/C: No chief complaint on file. Interval History: Status: not changed. No sig change in status  Her CT without IV contrast is negative  Has mild abd pains  She had Colonoscopy done last year she says? ROS:  Constitutional: negative for chills, fevers and sweats    Gastrointestinal: mild  abdominal pain, constipation, diarrhea, nausea and vomiting      Medications:      Allergies: No Known Allergies    Current Meds:   iohexol (OMNIPAQUE 240) injection 50 mL ONCE PRN   acetaminophen (TYLENOL) tablet 650 mg Q4H PRN   ciprofloxacin (CIPRO) IVPB 200 mg Q24H   midodrine (PROAMATINE) tablet 10 mg TID WC   potassium chloride 40 mEq in sodium chloride 0.45 % 1,000 mL infusion Continuous   PN-Adult 2-in-1 Central Line (Standard) Continuous TPN   fat emulsion 20 % infusion 250 mL Daily   sodium chloride flush 0.9 % injection 10 mL Q12H   sodium chloride flush 0.9 % injection 10 mL PRN   diphenoxylate-atropine (LOMOTIL) 2.5-0.025 MG per tablet 1 tablet 4x Daily PRN   loperamide (IMODIUM) capsule 2 mg TID PRN   sodium phosphate 10.29 mmol in dextrose 5 % 250 mL IVPB PRN   Or    sodium phosphate 20.61 mmol in dextrose 5 % 250 mL IVPB PRN   lidocaine PF 1 % injection 5 mL Once   sodium chloride flush 0.9 % injection 10 mL 2 times per day   sodium chloride flush 0.9 % injection 10 mL PRN   rifaximin (XIFAXAN) tablet 550 mg TID   potassium chloride 20 MEQ/15ML (10%) oral solution 40 mEq Once   glucose (GLUTOSE) 40 % oral gel 15 g PRN   dextrose 50 % solution 12.5 g PRN   glucagon (rDNA) injection 1 mg PRN   dextrose 5 % solution PRN   albuterol (PROVENTIL) nebulizer solution 2.5 mg Q6H PRN   [START ON 7/24/2017] vitamin D (ERGOCALCIFEROL) capsule 50,000 Units Weekly   ferrous sulfate EC tablet 325 mg TID WC   sodium chloride flush 0.9 % injection 10 mL 2 times per day   sodium chloride flush 0.9 % injection 10 mL PRN   potassium chloride (KLOR-CON M) extended release tablet 40 mEq PRN   Or    potassium chloride 20 MEQ/15ML (10%) oral solution 40 mEq PRN   Or    potassium chloride 10 mEq/100 mL IVPB (Peripheral Line) PRN   magnesium sulfate 1 g in dextrose 5% 100 mL IVPB PRN   ondansetron (ZOFRAN) injection 4 mg Q6H PRN   nicotine (NICODERM CQ) 21 MG/24HR 1 patch Daily PRN   heparin (porcine) injection 5,000 Units Once       Data:     Code Status:  Full Code    Family History   Problem Relation Age of Onset    Ovarian Cancer Mother     No Known Problems Father     Heart Disease Sister     Heart Surgery Sister     Heart Surgery Brother     No Known Problems Maternal Grandmother     No Known Problems Maternal Grandfather     No Known Problems Paternal Grandmother     No Known Problems Paternal Grandfather     No Known Problems Sister        Social History     Social History    Marital status:      Spouse name: N/A    Number of children: 0    Years of education: N/A     Occupational History    Not on file. Social History Main Topics    Smoking status: Never Smoker    Smokeless tobacco: Not on file    Alcohol use No    Drug use: No    Sexual activity: Not on file     Other Topics Concern    Not on file     Social History Narrative       Vitals:  BP (!) 105/49  Pulse 81  Temp 98.5 °F (36.9 °C) (Oral)   Resp 18  Ht 5' 4\" (1.626 m)  Wt 142 lb (64.4 kg)  SpO2 99%  BMI 24.37 kg/m2  Temp (24hrs), Av.3 °F (36.8 °C), Min:98 °F (36.7 °C), Max:98.5 °F (36.9 °C)    Recent Labs      17   1720  17   2037  17   0833  17   1156   POCGLU  131*  138*  104  139*       I/O (24Hr):     Intake/Output Summary (Last 24 hours) at 17 1635  Last data filed at 17 0537   Gross per 24 hour   Intake             3717 ml   Output             1325 ml   Net

## 2017-07-23 NOTE — PROGRESS NOTES
1234    potassium chloride 20 MEQ/15ML (10%) oral solution 40 mEq  40 mEq Oral Once Soledad Barrientos MD        glucose (GLUTOSE) 40 % oral gel 15 g  15 g Oral PRN Cecilio Galdamez MD        dextrose 50 % solution 12.5 g  12.5 g Intravenous PRN Cecilio Galdamez MD   12.5 g at 07/22/17 0537    glucagon (rDNA) injection 1 mg  1 mg Intramuscular PRN Cecilio Galdamez MD        dextrose 5 % solution  100 mL/hr Intravenous PRN Cecilio Galdamez MD        albuterol (PROVENTIL) nebulizer solution 2.5 mg  2.5 mg Nebulization Q6H PRN Soledad Barrientos MD        [START ON 7/24/2017] vitamin D (ERGOCALCIFEROL) capsule 50,000 Units  50,000 Units Oral Weekly Soledad Barrientos MD        ferrous sulfate EC tablet 325 mg  325 mg Oral TID WC Soledad Barrientos MD   325 mg at 07/23/17 5322    sodium chloride flush 0.9 % injection 10 mL  10 mL Intravenous 2 times per day Soledad Barrientos MD   10 mL at 07/23/17 0788    sodium chloride flush 0.9 % injection 10 mL  10 mL Intravenous PRN Soledad Barrientos MD        potassium chloride (KLOR-CON M) extended release tablet 40 mEq  40 mEq Oral PRN Soledad Barrientos MD        Or    potassium chloride 20 MEQ/15ML (10%) oral solution 40 mEq  40 mEq Oral PRN Soledad Barrientos MD        Or    potassium chloride 10 mEq/100 mL IVPB (Peripheral Line)  10 mEq Intravenous PRMELISSA Barrientos  mL/hr at 07/22/17 1119 10 mEq at 07/22/17 1119    magnesium sulfate 1 g in dextrose 5% 100 mL IVPB  1 g Intravenous PRN Soledad Barrientos MD   Stopped at 07/21/17 1218    ondansetron (ZOFRAN) injection 4 mg  4 mg Intravenous Q6H PRN Soledad Barrientos MD   4 mg at 07/23/17 0914    nicotine (NICODERM CQ) 21 MG/24HR 1 patch  1 patch Transdermal Daily PRMELISSA Barrientos MD        heparin (porcine) injection 5,000 Units  5,000 Units Subcutaneous Once Soledad Barrientos MD   Stopped at 07/20/17 2138     No Known Allergies  Principal Problem:    Malnutrition ([address]  Active Problems:    S/P right hemicolectomy    Hypokalemia resident. I have seen and examined the patient and the key elements of all parts of the encounter have been performed by me. I agree with the assessment, plan and orders as documented by the resident. (GC Modifier)  ).        Cozetta Fabry, DO  7/23/2017

## 2017-07-23 NOTE — PROGRESS NOTES
[facility]   Progress Note                      Date:                           7/23/2017  Patient name:           Matheus Barba  Date of admission:  7/20/2017  6:52 PM  MRN:   [id number]  YOB: 1940  PCP:                           Sukhdev Yu MD          Subjective:         Denies any fresh complaints        Problem Lists:   Primary Problem:  Malnutrition ([address]   Current Problems:  Active Hospital Problems    Diagnosis Date Noted    Anemia of chronic disease [D63.8] 07/22/2017    Weight loss [R63.4]     Malnutrition ([address] [E46] 07/20/2017    Acute on chronic kidney failure ([facility] [N17.9, N18.9] 07/20/2017    Chronic kidney disease (CKD), stage IV (severe) ([facility] [N18.4] 06/13/2017    Hypokalemia [E87.6] 12/06/2016    S/P right hemicolectomy [Z90.49] 12/06/2016     PMH:  Past Medical History:   Diagnosis Date    Adenomatous polyp 08/31/2016    HORTENCIA (acute kidney injury) ([facility] 12/6/2016    DM (diabetes mellitus) (HCC)     On Metformin    Hypercholesteremia     Hypertension     ON Amlodipine, Cozaar, Hydrochlorthiazide    Internal hemorrhoids     Lipoma 08/31/2016    Obesity     [facility]/17/2015    [facility]    Use of cane as ambulatory aid     as needed    Wears glasses       Allergies: No Known Allergies     Medications:   Scheduled Meds:   ciprofloxacin  200 mg Intravenous Q24H    sodium chloride flush  10 mL Intravenous Q12H    lidocaine PF  5 mL Intradermal Once    sodium chloride flush  10 mL Intravenous 2 times per day    midodrine  5 mg Oral TID WC    rifaximin  550 mg Oral TID    potassium chloride  40 mEq Oral Once    [START ON 7/24/2017] vitamin D  50,000 Units Oral Weekly    ferrous sulfate  325 mg Oral TID WC    sodium chloride flush  10 mL Intravenous 2 times per day    heparin (porcine)  5,000 Units Subcutaneous Once     PRN Medications: iohexol, acetaminophen, sodium chloride flush, diphenoxylate-atropine, loperamide, sodium the kidneys. Urinary bladder decompressed around a Issa catheter and suboptimally visualized. Assessment    1. Normocytic anemia, likely multifactorial, Anemia of renal disease and nutritional deficiency  2. HORTENCIA on CKD grade III  3. Malnutrition      Plan     1. Transfuse prbc if Hb less than 7  2. Initiate  STEPHEN as OP, continue oral Iron      Ernestina Barthel, MD  PGY-2, Internal Medicine Resident  [facility]      Attending Physician Statement  The patient was seen and examined during rounds, I have discussed the care of Demi Fernandes, including pertinent history and exam findings with the resident. I have reviewed the key elements of all parts of the encounter with the resident. I agree with the assessment, and status of the problem list as documented.    Additional assessment/ plan    As above  Watch hgb , Epo level pending  Plan to follow as outpatient     Javier Chaidez MD  Hematology Oncology  [phone]  Electronically signed by Zafar Parks MD on 7/23/2017 at 3:51 PM

## 2017-07-24 PROBLEM — N18.9 ANEMIA IN CHRONIC RENAL DISEASE: Status: ACTIVE | Noted: 2017-01-01

## 2017-07-24 PROBLEM — D63.1 ANEMIA IN CHRONIC RENAL DISEASE: Status: ACTIVE | Noted: 2017-01-01

## 2017-07-24 NOTE — PLAN OF CARE
Problem: Falls - Risk of  Goal: Absence of falls  Outcome: Met This Shift  Pt remains free of falls at this time. Bed locked in lowest position, siderails x2, call light in reach. Non-skid footwear applied. Pt ambulates in room with steady gait. Encouraged pt to call for assistance as needed for safety. Falling star posted outside of room. Will continue to monitor.

## 2017-07-24 NOTE — PLAN OF CARE
Problem: Falls - Risk of  Goal: Absence of falls  Outcome: Met This Shift  Pt remains free of falls at this time. Bed locked in lowest position, siderails x2, call light in reach. Non-skid footwear applied. Pt ambulates in room with steady gait. Encouraged pt to call for assistance as needed for safety. Falling star posted outside of room. Will continue to monitor. Problem: Nutrition  Goal: Optimal nutrition therapy  Outcome: Met This Shift  Pt getting TPN as ordered. Encouraging PO intake but Pt continuing to refuse.

## 2017-07-24 NOTE — PROGRESS NOTES
Nutrition Assessment    Type and Reason for Visit: Reassess    Nutrition Recommendations: Continue current General diet with Ensure Clear ONS. Encourage intakes as tolerated. Will continue TPN at 75 ml/hr with 200 g Dextrose, 60 g Amino Acids and 250 ml 20% lipids = 1420 kcal and 60 gm protein per day. Suggest in next bag increase potassium and continue MVI/Trace Elements. Will monitor labs and make changes as appropriate. Malnutrition Assessment:  · Malnutrition Status: Meets the criteria for severe malnutrition  · Context: Chronic illness  · Findings of the 6 clinical characteristics of malnutrition (Minimum of 2 out of 6 clinical characteristics is required to make the diagnosis of moderate or severe Protein Calorie Malnutrition based on AND/ASPEN Guidelines):  1. Energy Intake-Less than or equal to 75%, greater than or equal to 3 months    2. Weight Loss-20% loss or greater,  (9 months)  3. Fat Loss-Mild subcutaneous fat loss, Orbital, Triceps  4. Muscle Loss-Moderate muscle mass loss, Temples (temporalis muscle), Clavicles (pectoralis and deltoids)    Nutrition Diagnosis:   · Problem: Inadequate oral intake  · Etiology: related to Alteration in GI function, Variable/poor appetite     Signs and symptoms:  as evidenced by Diet history of poor intake, Nausea, Vomiting, Diarrhea, 45% weight loss x 9 months    Nutrition Assessment:  · Subjective Assessment: Pt started on TPN yesterday by Nephrology. RN states yesterday pt unable to eat or keep anything down with multiple bouts of nausea and frequent BM's. Today pt is doing much better and was able to take meals without c/o nausea or any episodes of vomiting. For lunch pt had roast beef and potatoes. RN states Nephrology would like TPN to continue for a few more days and consulted RD to write the TPN order. RN states potassium is low today.   · Nutrition-Focused Physical Findings: +Nausea resolving  · Wound Type: None  · Current Nutrition

## 2017-07-24 NOTE — PROGRESS NOTES
Physical Therapy  DATE: 2017    NAME: Demi Fernandes  MRN: [id number]   : 1940    Patient not seen this date for Physical Therapy due to:  [] Blood transfusion in progress  [] Hemodialysis  []  Patient Declined  [] Spine Precautions   [] Strict Bedrest  [] Surgery/ Procedure  [] Testing      [x] Other- xray of right foot ordered secondary to complaints of pain. Awaiting results prior evaluation this date. Will check back in PM as time allows        [] PT being discontinued at this time. Patient independent. No further needs. [] PT being discontinued at this time as the patient has been transferred to palliative care. No further needs.     Mabel Guerrero, PT

## 2017-07-24 NOTE — PROGRESS NOTES
17.3*   --    --    --   17.3*   PLT  210   --    --    --   204   MPV  10.0   --    --    --   10.1    < > = values in this interval not displayed. ASSESSMENT   1. HORTENCIA : pre renal dehydration and poor intake with diarrhea: resolved : Cr: 1.92 today (baseline : 1.5-2 )   2. CKD stage 3 : baseline creatinine : 1.5-2   3. APC: s/p hemicolectomy  4. Chronic severe malnutrition: onTPN  5. Acute gout : on colchicine ; uric acid : 8.6  6. Hypertension : BP: 105/61   7. Iron deficiency anemia    PLAN   1. Continue TPN for couple of days and dc. Her PO intake is better  2. Will sign off  3. Follow up in 3-4 weeks    Please do not hesitate to call with questions  Kailee Guerra  PGY-1  Internal Medicine       7/24/2017 10:54 AM  [facility]  Attending Physician Statement  I have discussed the care of Aleida Calvillo, including pertinent history and exam findings,  with the Fellow/Residentt. I have reviewed the key elements of all parts of the encounter with the Fellow/ Resident. I agree with the assessment, plan and orders as documented by the resident.     Saskia Gonzalez MD, MRCP Walter Blevins, FACP   7/24/2017 1:27 PM

## 2017-07-24 NOTE — PROGRESS NOTES
[facility]   Progress Note                      Date:                           7/24/2017  Patient name:           Kati Jane  Date of admission:  7/20/2017  6:52 PM  MRN:   [id number]  YOB: 1940  PCP:                           Meena Gonzalez MD          Subjective:         Denies any fresh complaints        Problem Lists:   Primary Problem:  Malnutrition ([facility]   Current Problems:  Active Hospital Problems    Diagnosis Date Noted    Anemia of chronic disease [D63.8] 07/22/2017    Weight loss [R63.4]     Malnutrition ([facility] [E46] 07/20/2017    Acute on chronic kidney failure ([facility] [N17.9, N18.9] 07/20/2017    Chronic kidney disease (CKD), stage IV (severe) ([facility] [N18.4] 06/13/2017    Hypokalemia [E87.6] 12/06/2016    S/P right hemicolectomy [Z90.49] 12/06/2016     PMH:  Past Medical History:   Diagnosis Date    Adenomatous polyp 08/31/2016    HORTENCIA (acute kidney injury) ([facility] 12/6/2016    DM (diabetes mellitus) (HCC)     On Metformin    Hypercholesteremia     Hypertension     ON Amlodipine, Cozaar, Hydrochlorthiazide    Internal hemorrhoids     Lipoma 08/31/2016    Obesity     [facility]/17/2015    [facility]    Use of cane as ambulatory aid     as needed    Wears glasses       Allergies: No Known Allergies     Medications:   Scheduled Meds:   ciprofloxacin  200 mg Intravenous Q24H    midodrine  10 mg Oral TID WC    sodium chloride flush  10 mL Intravenous Q12H    lidocaine PF  5 mL Intradermal Once    sodium chloride flush  10 mL Intravenous 2 times per day    rifaximin  550 mg Oral TID    potassium chloride  40 mEq Oral Once    vitamin D  50,000 Units Oral Weekly    ferrous sulfate  325 mg Oral TID WC    sodium chloride flush  10 mL Intravenous 2 times per day    heparin (porcine)  5,000 Units Subcutaneous Once     PRN Medications: morphine, iohexol, acetaminophen, diphenhydrAMINE, sodium chloride flush, diphenoxylate-atropine, loperamide, ALKPHOS in the last 72 hours. INR: No results for input(s): INR in the last 72 hours. PTT:No results for input(s): PTT in the last 72 hours. Labs:  General Labs:    CBC with Differential:    Lab Results   Component Value Date    WBC 10.5 07/23/2017    RBC 3.13 07/23/2017    HGB 8.5 07/23/2017    HCT 26.0 07/23/2017     07/23/2017    MCV 82.9 07/23/2017    MCH 27.1 07/23/2017    MCHC 32.8 07/23/2017    RDW 17.3 07/23/2017    METASPCT 2 10/31/2016    LYMPHOPCT 3 07/23/2017    MONOPCT 5 07/23/2017    BASOPCT 1 07/23/2017    MONOSABS 0.53 07/23/2017    LYMPHSABS 0.32 07/23/2017    EOSABS 0.00 07/23/2017    BASOSABS 0.11 07/23/2017    DIFFTYPE NOT REPORTED 07/23/2017     CMP:    Lab Results   Component Value Date     07/24/2017    K 3.5 07/24/2017     07/24/2017    CO2 26 07/24/2017    BUN 10 07/24/2017    CREATININE 1.92 07/24/2017    GFRAA 31 07/24/2017    LABGLOM 25 07/24/2017    GLUCOSE 184 07/24/2017    PROT 5.4 07/22/2017    LABALBU 3.2 07/21/2017    CALCIUM 8.7 07/24/2017    BILITOT 0.42 07/21/2017    ALKPHOS 110 07/21/2017    AST 15 07/21/2017    ALT 9 07/21/2017     TSH:    Lab Results   Component Value Date    TSH 2.16 05/11/2017     VITAMIN B12: No components found for: B12  FOLATE:    Lab Results   Component Value Date    FOLATE >20.0 04/05/2017     IRON:    Lab Results   Component Value Date    IRON 77 07/22/2017     Iron Saturation:  No components found for: PERCENTFE  TIBC:    Lab Results   Component Value Date    TIBC 171 07/22/2017     FERRITIN:    Lab Results   Component Value Date    FERRITIN 765 07/22/2017     Recent Labs      07/22/17   1240   LABIRON  45   TIBC  171*   FERRITIN  765*       Imaging  Xr Abdomen Limited (kub)    Result Date: 7/22/2017  EXAMINATION: SUPINE VIEW(S) OF THE ABDOMEN 7/22/2017 4:03 am COMPARISON: None.  HISTORY: ORDERING SYSTEM PROVIDED HISTORY: Verification of femoral central line TECHNOLOGIST PROVIDED HISTORY: Reason for exam:->Verification of femoral decompressed around a Issa catheter and suboptimally visualized. Assessment    1. Normocytic anemia, likely multifactorial, Anemia of renal disease and nutritional deficiency  2. HORTENCIA on CKD grade III  3. Malnutrition  4. UTI positive for citrobacter      Plan     1. Transfuse prbc if Hb less than 7  2. Follow up with erythropoietin level  3. Initiate  STEPHEN as OP, continue oral Iron  4. Conitune ciprofloxacin      Ramona Burns MD  PGY-2, Internal Medicine Resident  [facility]    Attending Physician Statement  The patient was seen and examined during rounds, I have discussed the care of [facility], including pertinent history and exam findings with the resident. I have reviewed the key elements of all parts of the encounter with the resident. I agree with the assessment, and status of the problem list as documented.    Additional assessment/ plan  As above plan Epo as outpatient       Jonelle Cleary MD  Hematology Oncology  [phone]  Electronically signed by Kaylene Butler MD on 7/24/2017 at 11:50 PM

## 2017-07-25 NOTE — CARE COORDINATION
SW talked to pt regarding PT/OT recs of SNF @ d/c  Pt refused going to a SNF-stating \"that will not happen\"  Pt lives with her sister and another lady who both assist pt   Pt is current with [facility] made aware

## 2017-07-25 NOTE — PLAN OF CARE
Problem: Falls - Risk of  Goal: Absence of falls  Outcome: Met This Shift  Patient assessed for fall risk and fall precautions initiated as needed. Patient instructed about safety devices and allowed to make decisions related to safey. Environment kept free of clutter and adequate lighting provided. Bed in lowest position with brakes locked. Call light in reach. Patient ID band correct and in place. Patient transferred with appropriate methods. Patient free of falls during shift. Will continue to monitor. Farhan Pierce RN    Problem: Nutrition  Goal: Optimal nutrition therapy  Outcome: Ongoing  Pt tolerating meals well. Eating about half of meals. Will continue to encourage oral intake. Will continue to monitor.  Farhan Pierce RN

## 2017-07-25 NOTE — PROGRESS NOTES
injection 10 mL 2 times per day   sodium chloride flush 0.9 % injection 10 mL PRN   potassium chloride (KLOR-CON M) extended release tablet 40 mEq PRN   Or    potassium chloride 20 MEQ/15ML (10%) oral solution 40 mEq PRN   Or    potassium chloride 10 mEq/100 mL IVPB (Peripheral Line) PRN   magnesium sulfate 1 g in dextrose 5% 100 mL IVPB PRN   ondansetron (ZOFRAN) injection 4 mg Q6H PRN   nicotine (NICODERM CQ) 21 MG/24HR 1 patch Daily PRN   heparin (porcine) injection 5,000 Units Once       Data:     Code Status:  Full Code    Family History   Problem Relation Age of Onset    Ovarian Cancer Mother     No Known Problems Father     Heart Disease Sister     Heart Surgery Sister     Heart Surgery Brother     No Known Problems Maternal Grandmother     No Known Problems Maternal Grandfather     No Known Problems Paternal Grandmother     No Known Problems Paternal Grandfather     No Known Problems Sister        Social History     Social History    Marital status:      Spouse name: N/A    Number of children: 0    Years of education: N/A     Occupational History    Not on file. Social History Main Topics    Smoking status: Never Smoker    Smokeless tobacco: Not on file    Alcohol use No    Drug use: No    Sexual activity: Not on file     Other Topics Concern    Not on file     Social History Narrative       Vitals:  /85  Pulse 101  Temp 98.8 °F (37.1 °C) (Oral)   Resp 14  Ht 5' 4\" (1.626 m)  Wt 142 lb (64.4 kg)  SpO2 96%  BMI 24.37 kg/m2  Temp (24hrs), Av.3 °F (36.8 °C), Min:97.9 °F (36.6 °C), Max:98.8 °F (37.1 °C)    Recent Labs      17   1156  17   0743  17   1153  17   2125   POCGLU  139*  186*  248*  202*       I/O (24Hr):     Intake/Output Summary (Last 24 hours) at 17 2302  Last data filed at 17 1827   Gross per 24 hour   Intake             1108 ml   Output             1050 ml   Net               58 ml       Labs:      CBC:   Lab Results   Component Value Date    WBC 10.5 07/23/2017    RBC 3.13 07/23/2017    HGB 8.5 07/23/2017    HCT 26.0 07/23/2017    MCV 82.9 07/23/2017    MCH 27.1 07/23/2017    MCHC 32.8 07/23/2017    RDW 17.3 07/23/2017     07/23/2017    MPV 10.1 07/23/2017     CBC with Differential:    Lab Results   Component Value Date    WBC 10.5 07/23/2017    RBC 3.13 07/23/2017    HGB 8.5 07/23/2017    HCT 26.0 07/23/2017     07/23/2017    MCV 82.9 07/23/2017    MCH 27.1 07/23/2017    MCHC 32.8 07/23/2017    RDW 17.3 07/23/2017    METASPCT 2 10/31/2016    LYMPHOPCT 3 07/23/2017    MONOPCT 5 07/23/2017    BASOPCT 1 07/23/2017    MONOSABS 0.53 07/23/2017    LYMPHSABS 0.32 07/23/2017    EOSABS 0.00 07/23/2017    BASOSABS 0.11 07/23/2017    DIFFTYPE NOT REPORTED 07/23/2017     Hemoglobin/Hematocrit:    Lab Results   Component Value Date    HGB 8.5 07/23/2017    HCT 26.0 07/23/2017     CMP:    Lab Results   Component Value Date     07/24/2017    K 3.5 07/24/2017     07/24/2017    CO2 26 07/24/2017    BUN 10 07/24/2017    CREATININE 1.92 07/24/2017    GFRAA 31 07/24/2017    LABGLOM 25 07/24/2017    GLUCOSE 184 07/24/2017    PROT 5.4 07/22/2017    LABALBU 3.2 07/21/2017    CALCIUM 8.7 07/24/2017    BILITOT 0.42 07/21/2017    ALKPHOS 110 07/21/2017    AST 15 07/21/2017    ALT 9 07/21/2017     BMP:    Lab Results   Component Value Date     07/24/2017    K 3.5 07/24/2017     07/24/2017    CO2 26 07/24/2017    BUN 10 07/24/2017    LABALBU 3.2 07/21/2017    CREATININE 1.92 07/24/2017    CALCIUM 8.7 07/24/2017    GFRAA 31 07/24/2017    LABGLOM 25 07/24/2017    GLUCOSE 184 07/24/2017     PT/INR:    Lab Results   Component Value Date    PROTIME 11.7 01/17/2017    INR 1.1 01/17/2017     PTT:    Lab Results   Component Value Date    APTT 21.2 01/17/2017   [APTT}    Physical Examination:        General appearance: alert, cooperative and no distress  Mental Status: oriented to person, place and time and anxious affect    Abdomen: soft, nontender, nondistended, bowel sounds present    Assessment:        Primary Problem  Malnutrition ([facility]     Active Hospital Problems    Diagnosis Date Noted    Anemia of chronic disease [D63.8] 07/22/2017    Weight loss [R63.4]     Malnutrition ([facility] [E46] 07/20/2017    Acute on chronic kidney failure ([facility] [N17.9, N18.9] 07/20/2017    Chronic kidney disease (CKD), stage IV (severe) ([facility] [facility] [N18.4] 06/13/2017    Hypokalemia [E87.6] 12/06/2016    S/P right hemicolectomy [Z90.49] 12/06/2016     Past Medical History:   Diagnosis Date    Adenomatous polyp 08/31/2016    HORTENCIA (acute kidney injury) ([facility] [address] 12/6/2016    DM (diabetes mellitus) ([facility] [address]     On Metformin    Hypercholesteremia     Hypertension     ON Amlodipine, Cozaar, Hydrochlorthiazide    Internal hemorrhoids     Lipoma 08/31/2016    Obesity     P[facility] 03/17/2015    [facility]    Use of cane as ambulatory aid     as needed    Wears glasses         IMPRESSION AND PLAN:        1. Advance diet as tolerated  2. Increase protein intake to 60-80 gm daily  3. Continue TPN. 4. Start calorie counts daily for 3 days. Continue TPN until patient is able to tolerate 2/3 or her intake orally, then wean off over 2 days.     Explained to the patient and d/w Nursing Staff at bedside      Electronically signed by Omari Rosales MD on 7/24/2017 at 11:02 PM

## 2017-07-25 NOTE — PROGRESS NOTES
restricted  Mobility: Walking and Moving Around Goal Status ():  At least 40 percent but less than 60 percent impaired, limited or restricted    OutComes Score                                             Goals  Short term goals  Time Frame for Short term goals: 14 visits  Short term goal 1: transfers with CGA  Short term goal 2: amb 25 ft with a RW x CGA  Short term goal 3: to be independent with bed mobility  Short term goal 4: exercise program x SBA       Therapy Time   Individual Concurrent Group Co-treatment   Time In 1010         Time Out 1036         Minutes 26             [address] Mikaela Calderón, PT

## 2017-07-25 NOTE — PROGRESS NOTES
Patient seen and examined at bedside. Reports feeling better today, does not have any complaints at this time. She states that her appetite has improved and she has been tolerating po intake. No nausea or vomiting today. Patient was resting comfortably in bed, physical exam unremarkable. Patel Chapman MD  [facility] Resident  7/24/2017 9:44 P. M.

## 2017-07-25 NOTE — PROGRESS NOTES
[facility]   Progress Note                      Date:                           7/25/2017  Patient name:           King Rivera  Date of admission:  7/20/2017  6:52 PM  MRN:   [id number]  YOB: 1940  PCP:                           Myriam Bhakta MD          Subjective:         Denies any fresh complaints        Problem Lists:   Primary Problem:  Malnutrition ([address]   Current Problems:  Active Hospital Problems    Diagnosis Date Noted    Anemia in chronic renal disease [N18.9, D63.1] 07/24/2017    Anemia of chronic disease [D63.8] 07/22/2017    Weight loss [R63.4]     Malnutrition ([address] [E46] 07/20/2017    Acute on chronic kidney failure ([address] [N17.9, N18.9] 07/20/2017    Chronic kidney disease (CKD), stage IV (severe) ([address] [N18.4] 06/13/2017    Hypokalemia [E87.6] 12/06/2016    S/P right hemicolectomy [Z90.49] 12/06/2016     PMH:  Past Medical History:   Diagnosis Date    Adenomatous polyp 08/31/2016    HORTENCIA (acute kidney injury) ([address] 12/6/2016    DM (diabetes mellitus) (HCC)     On Metformin    Hypercholesteremia     Hypertension     ON Amlodipine, Cozaar, Hydrochlorthiazide    Internal hemorrhoids     Lipoma 08/31/2016    Obesity     [facility] 03/17/2015    [facility]    Use of cane as ambulatory aid     as needed    Wears glasses       Allergies: No Known Allergies     Medications:   Scheduled Meds:   ciprofloxacin  200 mg Intravenous Q24H    midodrine  10 mg Oral TID WC    sodium chloride flush  10 mL Intravenous Q12H    lidocaine PF  5 mL Intradermal Once    sodium chloride flush  10 mL Intravenous 2 times per day    rifaximin  550 mg Oral TID    potassium chloride  40 mEq Oral Once    vitamin D  50,000 Units Oral Weekly    ferrous sulfate  325 mg Oral TID WC    sodium chloride flush  10 mL Intravenous 2 times per day    heparin (porcine)  5,000 Units Subcutaneous Once     PRN Medications: morphine, iohexol, acetaminophen, GLUCOSE  137*  184*  174*     Hepatic:   No results for input(s): AST, ALT, ALB, BILITOT, ALKPHOS in the last 72 hours. INR: No results for input(s): INR in the last 72 hours. PTT:No results for input(s): PTT in the last 72 hours. Labs:  General Labs:    CBC with Differential:    Lab Results   Component Value Date    WBC 10.5 07/23/2017    RBC 3.13 07/23/2017    HGB 8.5 07/23/2017    HCT 26.0 07/23/2017     07/23/2017    MCV 82.9 07/23/2017    MCH 27.1 07/23/2017    MCHC 32.8 07/23/2017    RDW 17.3 07/23/2017    METASPCT 2 10/31/2016    LYMPHOPCT 3 07/23/2017    MONOPCT 5 07/23/2017    BASOPCT 1 07/23/2017    MONOSABS 0.53 07/23/2017    LYMPHSABS 0.32 07/23/2017    EOSABS 0.00 07/23/2017    BASOSABS 0.11 07/23/2017    DIFFTYPE NOT REPORTED 07/23/2017     CMP:    Lab Results   Component Value Date     07/25/2017    K 3.9 07/25/2017     07/25/2017    CO2 25 07/25/2017    BUN 13 07/25/2017    CREATININE 1.91 07/25/2017    GFRAA 31 07/25/2017    LABGLOM 26 07/25/2017    GLUCOSE 174 07/25/2017    PROT 5.4 07/22/2017    LABALBU 3.2 07/21/2017    CALCIUM 9.4 07/25/2017    BILITOT 0.42 07/21/2017    ALKPHOS 110 07/21/2017    AST 15 07/21/2017    ALT 9 07/21/2017     TSH:    Lab Results   Component Value Date    TSH 2.16 05/11/2017     VITAMIN B12: No components found for: B12  FOLATE:    Lab Results   Component Value Date    FOLATE >20.0 04/05/2017     IRON:    Lab Results   Component Value Date    IRON 77 07/22/2017     Iron Saturation:  No components found for: PERCENTFE  TIBC:    Lab Results   Component Value Date    TIBC 171 07/22/2017     FERRITIN:    Lab Results   Component Value Date    FERRITIN 765 07/22/2017     Recent Labs      07/22/17   1240   LABIRON  45   TIBC  171*   FERRITIN  765*       Imaging  Xr Abdomen Limited (kub)    Result Date: 7/22/2017  EXAMINATION: SUPINE VIEW(S) OF THE ABDOMEN 7/22/2017 4:03 am COMPARISON: None.  HISTORY: ORDERING SYSTEM PROVIDED HISTORY: Verification of femoral central line TECHNOLOGIST PROVIDED HISTORY: Reason for exam:->Verification of femoral central line FINDINGS: The visualized bowel gas pattern is nonspecific and nonobstructive. No abnormally dilated loops of bowel are seen. There are clips from a cholecystectomy. There has been placement of right femoral central venous line seen with tip in the region of the mid common iliac vein. 1. Right femoral central venous line placement with tip in the region of the mid right common iliac vein. Xr Chest Portable    Result Date: 7/22/2017  EXAMINATION: SINGLE VIEW OF THE CHEST 7/22/2017 3:17 am COMPARISON: 01/17/2017. HISTORY: ORDERING SYSTEM PROVIDED HISTORY: Central Venous Catheter Placement and rule out pneumothorax TECHNOLOGIST PROVIDED HISTORY: Reason for exam:->Central Venous Catheter Placement and rule out pneumothorax FINDINGS: There are low lung volumes. The heart size and pulmonary vasculature are within normal limits. There is increased density involving the right lower lung zone. There has been removal of the right jugular central venous catheter. No new central venous catheter is seen. No pneumothorax is noted. 1. Interval removal of the right central venous line. 2. No new central venous line. 3. Right basilar atelectasis. Us Retroperitoneal Complete    Result Date: 7/22/2017  EXAMINATION: RETROPERITONEAL ULTRASOUND OF THE KIDNEYS AND URINARY BLADDER 7/21/2017 COMPARISON: None HISTORY: ORDERING SYSTEM PROVIDED HISTORY: rule out hydronephrosis No additional history provided FINDINGS: Kidneys: The right kidney measures 9.7 cm in length and the left kidney measures 8.6 cm in length. Kidneys demonstrate normal cortical echogenicity. No evidence of hydronephrosis or intrarenal stones. There is a cystic lesion within the upper pole the left kidney measuring 2.0 x 1.7 x 1.6 cm. Bladder: Urinary bladder is decompressed around a Issa catheter and not well visualized.      Small left upper pole renal cyst.  Otherwise, unremarkable ultrasound of the kidneys. Urinary bladder decompressed around a Issa catheter and suboptimally visualized. Assessment    1. Normocytic anemia, likely multifactorial, Anemia of renal disease and nutritional deficiency  2. HORTENCIA on CKD grade III  3. Malnutrition  4. UTI positive for citrobacter      Plan     1. Transfuse prbc if Hb less than 7  2. Erythropoietin level 20 inappropriately low   3. Continue oral Iron  4. Conitune ciprofloxacin      Félix Au MD  PGY-2, Internal Medicine Resident  [facility]              Attending Physician Statement  The patient was seen and examined during rounds, I have discussed the care of Pasha Goncalves, including pertinent history and exam findings with the resident. I have reviewed the key elements of all parts of the encounter with the resident. I agree with the assessment, and status of the problem list as documented.    Additional assessment/ plan        Jorge John MD  Hematology Oncology  [phone]  Electronically signed by Red Arambula MD on 7/26/2017 at 12:41 AM

## 2017-07-25 NOTE — PROGRESS NOTES
Occupational Therapy   Occupational Therapy Initial Assessment  Date: 2017   Patient Name: Gigi Schmidt  MRN: [id number]     : 1940    Patient Diagnosis(es):        Patient Active Problem List     Diagnosis Date Noted    Anemia in chronic renal disease 2017    Anemia of chronic disease 2017    Weight loss      Malnutrition ([address] 2017    Acute on chronic kidney failure ([facility] 2017    Vitamin D deficiency 2017    Localized swelling of both lower legs 2017    Chronic kidney disease (CKD), stage IV (severe) ([facility] [address] [facility] 2017    Iron deficiency anemia due to chronic blood loss 2017    Hypernatremia     Metabolic acidosis     Bandemia      Debilitated patient      S/P right hemicolectomy 2016    Hypokalemia 2016    Tubulovillous adenoma of colon 2016    Atelectasis      Adenomatous polyp      Internal hemorrhoids      Lymphedema 2014    Obesity due to excess calories      Hypertension        Past Medical History:   Diagnosis Date    Adenomatous polyp 2016    HORTENCIA (acute kidney injury) ([facility] [facility] 2016    DM (diabetes mellitus) ([facility] [facility]     On Metformin    Hypercholesteremia     Hypertension     ON Amlodipine, Cozaar, Hydrochlorthiazide    Internal hemorrhoids     Lipoma 2016    Obesity     [facility]onia 2015    [facility]    Use of cane as ambulatory aid     as needed    Wears glasses      Past Surgical History:   Procedure Laterality Date    ABDOMINAL ADHESION SURGERY  10/20/2016    robotic assisted laparascopic    CHOLECYSTECTOMY      COLONOSCOPY  2016    adenomatous polyp; IN THE DISTAL ASCENDING COLON AREA WITH LITTLE LOOPING OF THE SCOPE THERE A A LARGE ( ABOUT 6-7 CM ) FLAT POLYPOID LESION NOTED OVER A FOLD IT WAS VILLOUS APPEARING AND IS NOT AMENABLE TO BE REMOVED COMPLETELY BY ENDOSCOPY ALONE.  MULTIPLE BIOPSIES WERE TAKEN AND PICTURES WERE TAKEN.: HEPATIC FLEXURE A LARGE information obtained per pt, OTR questions above information based on pt's current level of functional ability. Objective   Vision: Impaired  Vision Exceptions: Wears glasses at all times  Hearing: Within functional limits    Orientation  Overall Orientation Status: Within Functional Limits     Balance  Sitting Balance: Stand by assistance (seated EOB ~12 minutes)    ADL  Feeding: Independent  Grooming: Increased time to complete;Stand by assistance  UE Bathing: Increased time to complete; Moderate assistance  LE Bathing: Maximum assistance; Increased time to complete  UE Dressing: Increased time to complete; Moderate assistance (seated EOB to don/doff hospital gown)  LE Dressing: Maximum assistance; Increased time to complete (seated EOB to don/doff socks- pt yells out as therapist assists with R sock donning/doffing due to R LE pain)  Toileting: Dependent/Total (for pericare/brief mngt)    RUE Tone: Normotonic  LUE Tone: Normotonic  Movements Are Fluid And Coordinated: Yes     Bed mobility  Rolling to Left: Minimal assistance  Rolling to Right: Minimal assistance  Supine to Sit: Minimal assistance  Sit to Supine: Minimal assistance  Comment: Pt required increased time throughout secondary to generalize weakness/fatigue and R LE pain with activity. Transfers  Sit to stand: Maximum assistance  Stand to sit: Maximum assistance  Transfer Comments: Pt unable to achieve a full standing position with max A x2, use of RW, and height of bed raised as pt stating R LE pain to significant. Pt unable to tolerate any WBing through R LE this session.      Cognition  Overall Cognitive Status: Exceptions  Safety Judgement: Decreased awareness of need for assistance  Insights: Decreased awareness of deficits     Sensation  Overall Sensation Status: WFL      LUE AROM : Exceptions  L Shoulder Flexion 0-180: ~0-80*  RUE AROM : Exceptions  R Shoulder Flexion 0-180: ~0-80*    LUE Strength  Gross LUE Strength: Exceptions to [facility] (grossly 3/5)  RUE Strength  Gross RUE Strength: Exceptions to [facility] (grossly 3/5)   Comments: Pt with generalized B UE weakness/fatigue. Assessment   Performance deficits / Impairments: Decreased functional mobility ; Decreased ADL status; Decreased strength;Decreased endurance;Decreased balance;Decreased safe awareness;Decreased high-level IADLs  Assessment: Pt with generalized weakness/fatigue, poor tolerance for activity, assistance required for all ADLs, and max A x2 required to attempt OOB activity. Pt unsafe to return home at this time and OTR recommending SNF; discussed recommendation with pt who is adamantly refusing placement at this time. Prognosis: Good  Decision Making: Medium Complexity  Patient Education: OT Services/OT POC, Importance of EOB/OOB Activity and Active Participation in ADLs, Safety with Transfers/RW Management, Fall Prevention, ADL Techniques, SNF Benefits/Recommendation, fair return  Discharge Recommendations: Subacute/Skilled Nursing Facility  REQUIRES OT FOLLOW UP: Yes  Activity Tolerance  Activity Tolerance: Patient limited by fatigue;Patient limited by pain  Safety Devices  Safety Devices in place: Yes  Type of devices: Call light within reach; Left in bed;Nurse notified  Restraints  Initially in place: No        Discharge Recommendations:  [facility]  Times per week: 4-5x/wk  Times per day: Daily  Current Treatment Recommendations: Strengthening, Balance Training, Functional Mobility Training, Endurance Training, Safety Education & Training, Self-Care / ADL, Patient/Caregiver Education & Training, Equipment Evaluation, Education, & procurement, Home Management Training    G-Code  OT G-codes  Functional Assessment Tool Used: BARHTEL INDEX  Score: 10/20  Functional Limitation: Self care  Self Care Current Status (): At least 40 percent but less than 60 percent impaired, limited or restricted  Self Care Goal Status ():  At least 20

## 2017-07-26 NOTE — PROGRESS NOTES
LIPOMA WAS ALSO NOTED    HEMICOLECTOMY  10/20/2016    open    HYSTERECTOMY      TUMOR REMOVAL      Lt. face close to ear       Restrictions  Restrictions/Precautions  Restrictions/Precautions: Fall Risk  Required Braces or Orthoses?: No  Position Activity Restriction  Other position/activity restrictions: Up as tolerated  Subjective   General  Chart Reviewed: Yes  Response To Previous Treatment: Patient with no complaints from previous session. Family / Caregiver Present: Yes ((2) Sisters)  Subjective  Subjective: RN and pt agreeable to PT  General Comment  Comments: pt initally refused PT, after the RN and PTA explained the importance of PT the pt then agreed to Tx. Pain Screening  Patient Currently in Pain: Yes  Pain Assessment  Pain Level: 7  Pain Type: Acute pain  Pain Location: Foot  Pain Descriptors: Aching;Discomfort  Pain Frequency: Continuous  Pain Intervention(s): RN notified; Ambulation/Increased activity; Emotional support;Distraction  Response to Pain Intervention: Patient Satisfied  Vital Signs  Patient Currently in Pain: Yes       Orientation  Orientation  Overall Orientation Status: Within Normal Limits  Objective   Bed mobility  Rolling to Left: Minimal assistance  Rolling to Right: Minimal assistance  Supine to Sit: Minimal assistance  Sit to Supine: Minimal assistance  Scooting: Minimal assistance  Comment: pt required Min A to progressing LE from floor to bed d/t increased pn  Transfers  Sit to Stand: Moderate Assistance (x2)  Stand to sit: Minimum  Assistance (x2)  Comment: two STS trails attempted, pt able to stand but very unsteady  Ambulation  Ambulation?: Yes  More Ambulation?: No  Ambulation 1  Surface: level tile  Device: Rolling Walker  Quality of Gait: Narrow GRAHAM  Distance: two steps  Comments: Two trials of AMB attempted, pt able to unweight her foot but immediately sits back down.   Stairs/Curb  Stairs?: No     Balance  Posture: Good  Sitting - Static: Fair  Sitting - Dynamic: Fair  Standing - Static: Fair;-  Standing - Dynamic: Poor  Comments: pt able to sit EOB for approx 15 mins and complete seated ex's with no LOB  Exercises Upper extremity exercises: Bicep curl, shoulder flexion/extension, punches, tricep curl, shoulder abduction/adduction. Reps: 15x1  Seated LE exercise program: hip abduction/adduction, heel/toe raises, and marches. Reps: 10x1 ( at EOB)   Long Arc Quads 5x2   Comments: frequent breaks required d/t increase pain        Assessment   Body structures, Functions, Activity limitations: Decreased functional mobility ; Decreased endurance;Decreased strength  Assessment: Seated EOB Usman UE/LE ex's. Pt c/o R foot pain,and fatigued quickly with mobilization. Pt would benefit from subacute/SNF to improve above mentioned deficits. Prognosis: Good  Patient Education: importance OOB activities  REQUIRES PT FOLLOW UP: Yes  Activity Tolerance  Activity Tolerance: Patient limited by pain; Patient limited by fatigue  Activity Tolerance: frequent breaks and increased time needed to complete ex's       Discharge Recommendations:  Subacute/Skilled Nursing Facility                 Goals  Short term goals  Time Frame for Short term goals: 14 visits  Short term goal 1: transfers with CGA  Short term goal 2: amb 25 ft with a RW x CGA  Short term goal 3: to be independent with bed mobility  Short term goal 4: exercise program x SBA    Plan    Plan  Times per week: 5-6x wk  Current Treatment Recommendations: Strengthening, Transfer Training, Endurance Training, Pain Management, Gait Training, Functional Mobility Training, Stair training, Safety Education & Training  Safety Devices  Type of devices:  All fall risk precautions in place, Nurse notified, Call light within reach, Left in bed, Gait belt  Restraints  Initially in place: No     Therapy Time   Individual Concurrent Group Co-treatment   Time In [phone]         Time Out 1426         Minutes 34               Treatment performed by Student PTA

## 2017-07-26 NOTE — PROGRESS NOTES
GASTROENTEROLOGY PROGRESS NOTE    7/25/2017   8:49 PM    Name:  Breanna Marquez  MRN:    [id number]     [place]:     [de-identified]   Room:  [address] [place] [address] Day: 5     Admit Date: 7/20/2017  6:52 PM  PCP: Emma Chiang MD    Subjective:     C/C: Severe weight loss. Interval History: Patient has been tolerating oral diet well. Denies any nausea, vomiting, abdominal pain, difficulty swallowing, diarrhea or constipation. ROS:  Constitutional: negative for chills, fevers and sweats    Medications:      Allergies: No Known Allergies    Current Meds:     colchicine (COLCRYS) tablet 0.6 mg Daily   HYDROcodone-acetaminophen (NORCO) 5-325 MG per tablet 1 tablet Q6H PRN   PN-Adult 2-in-1 Central Line (Standard) Continuous TPN   morphine injection 1 mg Q8H PRN   iohexol (OMNIPAQUE 240) injection 50 mL ONCE PRN   acetaminophen (TYLENOL) tablet 650 mg Q4H PRN   ciprofloxacin (CIPRO) IVPB 200 mg Q24H   midodrine (PROAMATINE) tablet 10 mg TID WC   fat emulsion 20 % infusion 250 mL Daily   diphenhydrAMINE (BENADRYL) injection 25 mg Nightly PRN   sodium chloride flush 0.9 % injection 10 mL Q12H   sodium chloride flush 0.9 % injection 10 mL PRN   diphenoxylate-atropine (LOMOTIL) 2.5-0.025 MG per tablet 1 tablet 4x Daily PRN   loperamide (IMODIUM) capsule 2 mg TID PRN   sodium phosphate 10.29 mmol in dextrose 5 % 250 mL IVPB PRN   Or    sodium phosphate 20.61 mmol in dextrose 5 % 250 mL IVPB PRN   lidocaine PF 1 % injection 5 mL Once   sodium chloride flush 0.9 % injection 10 mL 2 times per day   sodium chloride flush 0.9 % injection 10 mL PRN   rifaximin (XIFAXAN) tablet 550 mg TID   potassium chloride 20 MEQ/15ML (10%) oral solution 40 mEq Once   glucose (GLUTOSE) 40 % oral gel 15 g PRN   dextrose 50 % solution 12.5 g PRN   glucagon (rDNA) injection 1 mg PRN   dextrose 5 % solution PRN   albuterol (PROVENTIL) nebulizer solution 2.5 mg Q6H PRN   vitamin D (ERGOCALCIFEROL) capsule 50,000 Units Weekly   ferrous sulfate EC tablet 325 mg TID WC   sodium chloride flush 0.9 % injection 10 mL 2 times per day   sodium chloride flush 0.9 % injection 10 mL PRN   potassium chloride (KLOR-CON M) extended release tablet 40 mEq PRN   Or    potassium chloride 20 MEQ/15ML (10%) oral solution 40 mEq PRN   Or    potassium chloride 10 mEq/100 mL IVPB (Peripheral Line) PRN   magnesium sulfate 1 g in dextrose 5% 100 mL IVPB PRN   ondansetron (ZOFRAN) injection 4 mg Q6H PRN   nicotine (NICODERM CQ) 21 MG/24HR 1 patch Daily PRN   heparin (porcine) injection 5,000 Units Once       Data:     Code Status:  Full Code    Family History   Problem Relation Age of Onset    Ovarian Cancer Mother     No Known Problems Father     Heart Disease Sister     Heart Surgery Sister     Heart Surgery Brother     No Known Problems Maternal Grandmother     No Known Problems Maternal Grandfather     No Known Problems Paternal Grandmother     No Known Problems Paternal Grandfather     No Known Problems Sister        Social History     Social History    Marital status:      Spouse name: N/A    Number of children: 0    Years of education: N/A     Occupational History    Not on file. Social History Main Topics    Smoking status: Never Smoker    Smokeless tobacco: Not on file    Alcohol use No    Drug use: No    Sexual activity: Not on file     Other Topics Concern    Not on file     Social History Narrative       Vitals:  /63  Pulse 89  Temp 98.6 °F (37 °C) (Oral)   Resp 16  Ht 5' 4\" (1.626 m)  Wt 161 lb 2.5 oz (73.1 kg)  SpO2 100%  BMI 27.66 kg/m2  Temp (24hrs), Av.8 °F (37.1 °C), Min:98.3 °F (36.8 °C), Max:99.6 °F (37.6 °C)    Recent Labs      17   0743  17   1153  17   2125  17   0706   POCGLU  186*  248*  202*  163*       I/O (24Hr):     Intake/Output Summary (Last 24 hours) at 17  Last data filed at 17 1900   Gross per 24 hour   Intake             2616 ml   Output              550 ml   Net 2066 ml       Labs:      CBC:   Lab Results   Component Value Date    WBC 10.5 07/23/2017    RBC 3.13 07/23/2017    HGB 8.5 07/23/2017    HCT 26.0 07/23/2017    MCV 82.9 07/23/2017    MCH 27.1 07/23/2017    MCHC 32.8 07/23/2017    RDW 17.3 07/23/2017     07/23/2017    MPV 10.1 07/23/2017     CBC with Differential:    Lab Results   Component Value Date    WBC 10.5 07/23/2017    RBC 3.13 07/23/2017    HGB 8.5 07/23/2017    HCT 26.0 07/23/2017     07/23/2017    MCV 82.9 07/23/2017    MCH 27.1 07/23/2017    MCHC 32.8 07/23/2017    RDW 17.3 07/23/2017    METASPCT 2 10/31/2016    LYMPHOPCT 3 07/23/2017    MONOPCT 5 07/23/2017    BASOPCT 1 07/23/2017    MONOSABS 0.53 07/23/2017    LYMPHSABS 0.32 07/23/2017    EOSABS 0.00 07/23/2017    BASOSABS 0.11 07/23/2017    DIFFTYPE NOT REPORTED 07/23/2017     Hemoglobin/Hematocrit:    Lab Results   Component Value Date    HGB 8.5 07/23/2017    HCT 26.0 07/23/2017     CMP:    Lab Results   Component Value Date     07/25/2017    K 3.9 07/25/2017     07/25/2017    CO2 25 07/25/2017    BUN 13 07/25/2017    CREATININE 1.91 07/25/2017    GFRAA 31 07/25/2017    LABGLOM 26 07/25/2017    GLUCOSE 174 07/25/2017    PROT 5.4 07/22/2017    LABALBU 3.2 07/21/2017    CALCIUM 9.4 07/25/2017    BILITOT 0.42 07/21/2017    ALKPHOS 110 07/21/2017    AST 15 07/21/2017    ALT 9 07/21/2017     BMP:    Lab Results   Component Value Date     07/25/2017    K 3.9 07/25/2017     07/25/2017    CO2 25 07/25/2017    BUN 13 07/25/2017    LABALBU 3.2 07/21/2017    CREATININE 1.91 07/25/2017    CALCIUM 9.4 07/25/2017    GFRAA 31 07/25/2017    LABGLOM 26 07/25/2017    GLUCOSE 174 07/25/2017     PT/INR:    Lab Results   Component Value Date    PROTIME 11.7 01/17/2017    INR 1.1 01/17/2017     PTT:    Lab Results   Component Value Date    APTT 21.2 01/17/2017   [APTT}    Physical Examination:        General appearance: alert, cooperative and no distress  Mental Status:

## 2017-07-26 NOTE — PLAN OF CARE
Problem: Falls - Risk of  Goal: Absence of falls  Outcome: Met This Shift  Patient assessed for fall risk and fall precautions initiated as needed. Patient instructed about safety devices and allowed to make decisions related to safey. Environment kept free of clutter and adequate lighting provided. Bed in lowest position with brakes locked. Call light in reach. Patient ID band correct and in place. Patient transferred with appropriate methods. Patient free of falls during shift. Will continue to monitor. Aubrey Smith RN    Problem: Nutrition  Goal: Optimal nutrition therapy  Outcome: Ongoing  Pt tolerating oral intake well. No c/o N/V. Will continue to encourage oral intake. Will continue to monitor.  Aubrey Smith RN

## 2017-07-26 NOTE — PROGRESS NOTES
Department of Family Medicine  Resident Progress Note  [address]    Date:   7/26/2017  Patient name:  Eliecer Estevez  Date of admission:  7/20/2017  6:52 PM  MRN:   [id number]  YOB: 1940    SUBJECTIVE   Patient seen and examined at bedside. Pt feels overall better. She denies N/V/D and is now able to eat 50% of her meals and keep all PO intake down. She only eats 50% because of fullness but we encouraged her to try and eat all of her food. Right foot pain has resolved. Denies SOB, CP, dysuria, fever/chills. Review of Systems:  · General: Positive for fatigue, weight loss  · Cardio: Negative for chest pain, orthopnea and PND  · Respiratory: Negative for coughing, wheezing  · GI: Negative for nausea, vomiting, diarrhea    OBJECTIVE   BP (!) 97/58  Pulse 97  Temp 99 °F (37.2 °C) (Oral)   Resp 16  Ht 5' 4\" (1.626 m)  Wt 161 lb 2.5 oz (73.1 kg)  SpO2 97%  BMI 27.66 kg/m2   Constitutional: No acute distress, AOx3, fem line clean, dry, intact.  Receiving TPN  HEENT: atraumatic normocephalic, neck supple, symmetrical, trachea midline, no adenopathy,  no jvd, skin normal  Respiratory: clear to auscultation b/l  Cardiovascular: regular rate and rhythm, normal S1, S2, no murmur noted and 2+ pulses throughout  Abdomen: soft, nontender, nondistended, Surgical scar  Extremities:  peripheral pulses normal, no pedal edema, no clubbing or cyanosis  MSK: right foot slightly swollen and but not tender to touch    Intake/Output:    Intake/Output Summary (Last 24 hours) at 07/26/17 0832  Last data filed at 07/26/17 0445   Gross per 24 hour   Intake             2394 ml   Output                0 ml   Net             2394 ml       Laboratory Testing:  CBC:   Recent Labs      07/23/17   1240   WBC  10.5   HGB  8.5*   PLT  204     BMP:    Recent Labs      07/24/17   0702  07/25/17   0650  07/26/17   0632   NA  144  138  139   K  3.5*  3.9  3.6*   CL  101  100  104   CO2  26  25  22   BUN  10  13  16 CREATININE  1.92*  1.91*  1.78*   GLUCOSE  184*  174*  170*     Magnesium:   Lab Results   Component Value Date    MG 1.8 07/26/2017     Phosphorus:   Lab Results   Component Value Date    PHOS 2.4 07/26/2017     Ionized Calcium:   Lab Results   Component Value Date    CAION 1.31 07/26/2017      PT/INR:    Lab Results   Component Value Date    PROTIME 11.7 01/17/2017    INR 1.1 01/17/2017     PTT:    Lab Results   Component Value Date    APTT 21.2 01/17/2017       ASSESSMENT   Principal Problem:    Malnutrition ([address]  Active Problems:    S/P right hemicolectomy    Hypokalemia    Chronic kidney disease (CKD), stage IV (severe) (HCC)    Acute on chronic kidney failure (HCC)    Weight loss    Anemia of chronic disease    Anemia in chronic renal disease      PLAN     Severe Malnutrition  - Patient meets diagnostic criteria for severe calorie malnutrition per dietician (has 5/6 AND/ASPEN Guidelines); normal pre-alb, albumin  - S/P R hemicolectomy 2016  - per surgery, no indication for PEG. Signed off at this time  - per dietary, start weaning from TPN last night  - GI and nephro signed off. No EGD  - discharge once weaned off TPN    UTI  - UA positive, UCx citrobacter  - resume IV cipro 097 BID    Metabolic acidosis with anion gap and resp compensation  - likely secondary to malnutrition ketosis  - resolved     HORTENCIA on CKD Stage IV  Severe electrolyte imbalance  Hypokalemia/hypomagnesemia/hypophosphatemia  - at baseline; Cr 1.91  - resolved    Diarrhea: likely functional  - resolved      Foot Pain  - resolved  - continue colchicine qd and norco    Anemia- likely AOCD  - Iron studies consistent with AOCD  - Heme/onc consulted: agree with AOCD, continue ferrous sulfate and plan STEPHEN as outpatient    This plan will be discussed with the rounding attending: Dr. Noemy Ruiz.       Kirsten Rendon MD PGY-1  7/26/2017 8:32 AM

## 2017-07-26 NOTE — PROGRESS NOTES
[facility]   Progress Note                      Date:                           7/26/2017  Patient name:           Lesley Dougherty  Date of admission:  7/20/2017  6:52 PM  MRN:   [id number]  YOB: 1940  PCP:                           Teresa Hartman MD          Subjective:         Denies any fresh complaints        Problem Lists:   Primary Problem:  Malnutrition ([address]   Current Problems:  Active Hospital Problems    Diagnosis Date Noted    Anemia in chronic renal disease [N18.9, D63.1] 07/24/2017    Anemia of chronic disease [D63.8] 07/22/2017    Weight loss [R63.4]     Malnutrition ([facility] [E46] 07/20/2017    Acute on chronic kidney failure ([facility] [N17.9, N18.9] 07/20/2017    Chronic kidney disease (CKD), stage IV (severe) ([address] [N18.4] 06/13/2017    Hypokalemia [E87.6] 12/06/2016    S/P right hemicolectomy [Z90.49] 12/06/2016     PMH:  Past Medical History:   Diagnosis Date    Adenomatous polyp 08/31/2016    HORTENCIA (acute kidney injury) ([facility] 12/6/2016    DM (diabetes mellitus) (HCC)     On Metformin    Hypercholesteremia     Hypertension     ON Amlodipine, Cozaar, Hydrochlorthiazide    Internal hemorrhoids     Lipoma 08/31/2016    Obesity     Pneumonia 03/17/2015    [facility]    Use of cane as ambulatory aid     as needed    Wears glasses       Allergies: No Known Allergies     Medications:   Scheduled Meds:   colchicine  0.6 mg Oral Daily    ciprofloxacin  200 mg Intravenous Q24H    midodrine  10 mg Oral TID WC    sodium chloride flush  10 mL Intravenous Q12H    lidocaine PF  5 mL Intradermal Once    sodium chloride flush  10 mL Intravenous 2 times per day    rifaximin  550 mg Oral TID    potassium chloride  40 mEq Oral Once    vitamin D  50,000 Units Oral Weekly    ferrous sulfate  325 mg Oral TID WC    sodium chloride flush  10 mL Intravenous 2 times per day    heparin (porcine)  5,000 Units Subcutaneous Once     PRN Medications: HYDROcodone 5 mg - acetaminophen, morphine, iohexol, acetaminophen, diphenhydrAMINE, sodium chloride flush, diphenoxylate-atropine, loperamide, sodium phosphate IVPB **OR** sodium phosphate IVPB, sodium chloride flush, glucose, dextrose, glucagon (rDNA), dextrose, albuterol, sodium chloride flush, potassium chloride **OR** potassium chloride **OR** potassium chloride, magnesium sulfate, ondansetron, nicotine  Continuous Infusions:   PN-Adult 2-in-1 Central Line (Standard) 60 mL/hr at 07/25/17 1832    fat emulsion 250 mL (07/25/17 1832)    dextrose             Objective:     Vitals: /61  Pulse 96  Temp 98 °F (36.7 °C) (Oral)   Resp 16  Ht 5' 4\" (1.626 m)  Wt 161 lb 2.5 oz (73.1 kg)  SpO2 100%  BMI 27.66 kg/m2    General appearance - well appearing, no in pain or distress   Mental status - alert and cooperative   Eyes - pupils equal and reactive, pallor ++  Ears - bilateral TM's and external ear canals normal   Mouth - mucous membranes moist, pharynx normal without lesions   Neck - supple, no significant adenopathy   Lymphatics - no palpable lymphadenopathy, no hepatosplenomegaly   Chest - clear to auscultation, no wheezes, rales or rhonchi, symmetric air entry   Heart - normal S1, S2  Abdomen - soft, nontender, nondistended, no masses or organomegaly   Neurological - alert, oriented, normal speech  Musculoskeletal - no joint tenderness, deformity or swelling   Extremities - peripheral pulses normal, pitting 1 + B/l pedal edema  Skin - dry scaly skin    Data: In: 2394 [P.O.:360; I.V.:2034]  Out: -     CBC:   Recent Labs      07/23/17   1240   WBC  10.5   HGB  8.5*   PLT  204     BMP:    Recent Labs      07/24/17   0702  07/25/17   0650  07/26/17   0632   NA  144  138  139   K  3.5*  3.9  3.6*   CL  101  100  104   CO2  26  25  22   BUN  10  13  16   CREATININE  1.92*  1.91*  1.78*   GLUCOSE  184*  174*  170*     Hepatic:   No results for input(s): AST, ALT, ALB, BILITOT, ALKPHOS in the last 72 hours.   INR: No results for input(s): INR in the last 72 hours. PTT:No results for input(s): PTT in the last 72 hours. Labs:  General Labs:    CBC with Differential:    Lab Results   Component Value Date    WBC 10.5 07/23/2017    RBC 3.13 07/23/2017    HGB 8.5 07/23/2017    HCT 26.0 07/23/2017     07/23/2017    MCV 82.9 07/23/2017    MCH 27.1 07/23/2017    MCHC 32.8 07/23/2017    RDW 17.3 07/23/2017    METASPCT 2 10/31/2016    LYMPHOPCT 3 07/23/2017    MONOPCT 5 07/23/2017    BASOPCT 1 07/23/2017    MONOSABS 0.53 07/23/2017    LYMPHSABS 0.32 07/23/2017    EOSABS 0.00 07/23/2017    BASOSABS 0.11 07/23/2017    DIFFTYPE NOT REPORTED 07/23/2017     CMP:    Lab Results   Component Value Date     07/26/2017    K 3.6 07/26/2017     07/26/2017    CO2 22 07/26/2017    BUN 16 07/26/2017    CREATININE 1.78 07/26/2017    GFRAA 34 07/26/2017    LABGLOM 28 07/26/2017    GLUCOSE 170 07/26/2017    PROT 5.4 07/22/2017    LABALBU 3.2 07/21/2017    CALCIUM 9.5 07/26/2017    BILITOT 0.42 07/21/2017    ALKPHOS 110 07/21/2017    AST 15 07/21/2017    ALT 9 07/21/2017     TSH:    Lab Results   Component Value Date    TSH 2.16 05/11/2017     VITAMIN B12: No components found for: B12  FOLATE:    Lab Results   Component Value Date    FOLATE >20.0 04/05/2017     IRON:    Lab Results   Component Value Date    IRON 77 07/22/2017     Iron Saturation:  No components found for: PERCENTFE  TIBC:    Lab Results   Component Value Date    TIBC 171 07/22/2017     FERRITIN:    Lab Results   Component Value Date    FERRITIN 765 07/22/2017     No results for input(s): LABIRON, TIBC, FERRITIN, PPYKMCGH55, FOLATE, OCCULTBLD in the last 72 hours. Imaging  Xr Abdomen Limited (kub)    Result Date: 7/22/2017  EXAMINATION: SUPINE VIEW(S) OF THE ABDOMEN 7/22/2017 4:03 am COMPARISON: None.  HISTORY: ORDERING SYSTEM PROVIDED HISTORY: Verification of femoral central line TECHNOLOGIST PROVIDED HISTORY: Reason for exam:->Verification of femoral central line Issa catheter and suboptimally visualized. Assessment    1. Normocytic anemia, likely multifactorial, Anemia of renal disease and nutritional deficiency  2. HORTENCIA on CKD grade III  3. Malnutrition  4. UTI positive for citrobacter      Plan     1. Transfuse prbc if Hb less than 7  2. Erythropoietin level 20 inappropriately low   3. Continue oral Iron  4. Conitune ciprofloxacin      Ramona Burns MD  PGY-2, Internal Medicine Resident  [address] [place] [address]      Attending Physician Statement  The patient was seen and examined during rounds, I have discussed the care of Brand Locus, including pertinent history and exam findings with the resident. I have reviewed the key elements of all parts of the encounter with the resident. I agree with the assessment, and status of the problem list as documented.    Additional assessment/ plan  Plan outpatient Samia Pichardo MD  Hematology Oncology  [phone]  Electronically signed by Kaylene Butler MD on 7/26/2017 at 9:04 PM

## 2017-07-26 NOTE — PROGRESS NOTES
Patient seen and examined at bedside. Reports feeling better today, states that her appetite continues to improve, she has been tolerating po intake well, denies any n/v. Patient has no complaints at this time. Denies any chest pain, dizziness, lightheadedness, abdominal pain. Patient continues to have diarrhea but is otherwise doing well, per nurse. Physical exam is unremarkable.     Mary Dias MD  [facility] Resident  7/25/2017 9:31 PM

## 2017-07-26 NOTE — PROGRESS NOTES
Nutrition Assessment    Type and Reason for Visit: Reassess    Nutrition Recommendations: Continue current General diet. Will discontinue Ensure Clear Liquid per pt request and instead order Ensure Enlive ONS with meals. Encourage intakes of meals and supplements as tolerated  TPN to be stopped today - wean current bag off. Malnutrition Assessment:  · Malnutrition Status: Meets the criteria for severe malnutrition  · Context: Chronic illness  · Findings of the 6 clinical characteristics of malnutrition (Minimum of 2 out of 6 clinical characteristics is required to make the diagnosis of moderate or severe Protein Calorie Malnutrition based on AND/ASPEN Guidelines):  1. Energy Intake-Less than or equal to 75%, greater than or equal to 3 months    2. Weight Loss-20% loss or greater,  (9 months)  3. Fat Loss-Mild subcutaneous fat loss, Orbital, Triceps  4. Muscle Loss-Moderate muscle mass loss, Temples (temporalis muscle), Clavicles (pectoralis and deltoids)    Nutrition Diagnosis:   · Problem: Inadequate oral intake  · Etiology: related to Alteration in GI function, Variable/poor appetite     Signs and symptoms:  as evidenced by Diet history of poor intake, Nausea, Intake 50-75%, 45% weight loss x 9 months    Nutrition Assessment:  · Subjective Assessment: Pt continues to consume at least 50% of her meals. This morning had eggs with toast and coffee for breakfast.  Appetite continues to improve and no issues with nausea. Spoke with RN about weaning TPN off today and stopping after current bag. Pt states she does not like the Ensure clear ONS but would like to try Ensure Enlive supplements.   · Nutrition-Focused Physical Findings: +Nausea resolving  · Wound Type: None  · Current Nutrition Therapies:  · Oral Diet Orders: General   · Oral Diet intake: 26-50%, 51-75%  · Oral Nutrition Supplement (ONS) Orders: Clear Liquid Oral Supplement  · ONS intake: 0% - does not like  · Parenteral Nutrition Orders:  · Type and Formula: 2-in-1 Standard   · Lipids: 250ml, Daily  · Additives: 150 g Dextrose, 60 g Amino Acids - Weaning off and discontinuing today  · Rate/Volume: 60 ml/hr (1440 ml/day)  · Duration: Continuous 24 hrs  · Current PN Order Provides: 1260 kcal and 60 gm protein  · Anthropometric Measures:  · Ht: 5' 4\" (162.6 cm)   · Current Body Wt: 161 lb 2.5 oz (73.1 kg)  · Admission Body Wt: 141 lb 15.6 oz (64.4 kg)  · Usual Body Wt: 260 lb (117.9 kg) (10/20/2017)  · % Weight Change: 45%,  9 months  · Ideal Body Wt: 120 lb (54.4 kg), % Ideal Body 119%  · BMI Classification: BMI 25.0 - 29.9 Overweight  · Comparative Standards (Estimated Nutrition Needs):  · Estimated Daily Total Kcal: 2942-3459 kcals/day  · Estimated Daily Protein (g): 50-65 g/day    Estimated Intake vs Estimated Needs: Intake Improving    Nutrition Risk Level: High    Nutrition Interventions:   Continue current diet, Modify current ONS, Discontinue Parenteral Nutrition - Will provide Ensure Enlive ONS. Continued Inpatient Monitoring, Education Not Indicated    Nutrition Evaluation:   · Evaluation: Progressing toward goals   · Goals: Meet greater than 75% of estimated nutrient needs. · Monitoring: Meal Intake, Supplement Intake, Diet Tolerance, Skin Integrity, Fluid Balance, Nausea or Vomiting, Pertinent Labs, Weight    See Adult Nutrition Doc Flowsheet for more detail.      Electronically signed by Whitney Quinteros RD, LD on 7/26/17 at 1:23 PM    Contact Number: [phone]

## 2017-07-27 NOTE — PLAN OF CARE
Problem: Falls - Risk of  Goal: Absence of falls  Outcome: Met This Shift  Pt remains free from falls. Pt assessed as medium fall risk. Fall precautions in place including fall star posted, bed locked in lowest position, non skid slippers in place, side rails x2, call light in reach and room free from clutter. Will continue to monitor and reinforce use of call light with hourly rounding.

## 2017-07-27 NOTE — PROGRESS NOTES
[facility]   Progress Note                      Date:                           7/27/2017  Patient name:           Cheyenne Wolf  Date of admission:  7/20/2017  6:52 PM  MRN:   [id number]  YOB: 1940  PCP:                           Dianelys Campbell MD          Subjective:         Denies any fresh complaints  Hb 6.2, Plt 226        Problem Lists:   Primary Problem:  Malnutrition ([address]   Current Problems:  Active Hospital Problems    Diagnosis Date Noted    Anemia in chronic renal disease [N18.9, D63.1] 07/24/2017    Anemia of chronic disease [D63.8] 07/22/2017    Weight loss [R63.4]     Malnutrition ([facility] [E46] 07/20/2017    Acute on chronic kidney failure ([facility] [N17.9, N18.9] 07/20/2017    Chronic kidney disease (CKD), stage IV (severe) ([facility] [N18.4] 06/13/2017    Hypokalemia [E87.6] 12/06/2016    S/P right hemicolectomy [Z90.49] 12/06/2016     PMH:  Past Medical History:   Diagnosis Date    Adenomatous polyp 08/31/2016    HORTENCIA (acute kidney injury) ([facility] 12/6/2016    DM (diabetes mellitus) (HCC)     On Metformin    Hypercholesteremia     Hypertension     ON Amlodipine, Cozaar, Hydrochlorthiazide    Internal hemorrhoids     Lipoma 08/31/2016    Obesity     P[facility] 03/17/2015    [facility]    Use of cane as ambulatory aid     as needed    Wears glasses       Allergies: No Known Allergies     Medications:   Scheduled Meds:   colchicine  0.6 mg Oral Daily    ciprofloxacin  200 mg Intravenous Q24H    midodrine  10 mg Oral TID WC    sodium chloride flush  10 mL Intravenous Q12H    lidocaine PF  5 mL Intradermal Once    sodium chloride flush  10 mL Intravenous 2 times per day    rifaximin  550 mg Oral TID    potassium chloride  40 mEq Oral Once    vitamin D  50,000 Units Oral Weekly    ferrous sulfate  325 mg Oral TID WC    sodium chloride flush  10 mL Intravenous 2 times per day    heparin (porcine)  5,000 Units Subcutaneous Once     PRN Medications: HYDROcodone 5 mg - acetaminophen, morphine, iohexol, acetaminophen, diphenhydrAMINE, sodium chloride flush, diphenoxylate-atropine, loperamide, sodium phosphate IVPB **OR** sodium phosphate IVPB, sodium chloride flush, glucose, dextrose, glucagon (rDNA), dextrose, albuterol, sodium chloride flush, potassium chloride **OR** potassium chloride **OR** potassium chloride, magnesium sulfate, ondansetron, nicotine  Continuous Infusions:   dextrose             Objective:     Vitals: BP (!) 106/54  Pulse 77  Temp 98.7 °F (37.1 °C) (Oral)   Resp 16  Ht 5' 4\" (1.626 m)  Wt 161 lb 6.4 oz (73.2 kg)  SpO2 95%  BMI 27.7 kg/m2    General appearance - well appearing, no in pain or distress   Mental status - alert and cooperative   Eyes - pupils equal and reactive, pallor ++  Ears - bilateral TM's and external ear canals normal   Mouth - mucous membranes moist, pharynx normal without lesions   Neck - supple, no significant adenopathy   Lymphatics - no palpable lymphadenopathy, no hepatosplenomegaly   Chest - clear to auscultation, no wheezes, rales or rhonchi, symmetric air entry   Heart - normal S1, S2  Abdomen - soft, nontender, nondistended, no masses or organomegaly   Neurological - alert, oriented, normal speech  Musculoskeletal - no joint tenderness, deformity or swelling   Extremities - peripheral pulses normal, pitting 1 + B/l pedal edema  Skin - dry scaly skin    Data: In: 2022 [P.O.:1160; I.V.:862]  Out: -     CBC:   No results for input(s): WBC, HGB, PLT in the last 72 hours. BMP:    Recent Labs      07/25/17   0650  07/26/17   0632  07/27/17   0736   NA  138  139  142   K  3.9  3.6*  4.0   CL  100  104  109*   CO2  25  22  19*   BUN  13  16  19   CREATININE  1.91*  1.78*  1.95*   GLUCOSE  174*  170*  85     Hepatic:   No results for input(s): AST, ALT, ALB, BILITOT, ALKPHOS in the last 72 hours. INR: No results for input(s): INR in the last 72 hours.   PTT:No results for input(s): PTT in the last 72 hours. Labs:  General Labs:    CBC with Differential:    Lab Results   Component Value Date    WBC 10.5 07/23/2017    RBC 3.13 07/23/2017    HGB 8.5 07/23/2017    HCT 26.0 07/23/2017     07/23/2017    MCV 82.9 07/23/2017    MCH 27.1 07/23/2017    MCHC 32.8 07/23/2017    RDW 17.3 07/23/2017    METASPCT 2 10/31/2016    LYMPHOPCT 3 07/23/2017    MONOPCT 5 07/23/2017    BASOPCT 1 07/23/2017    MONOSABS 0.53 07/23/2017    LYMPHSABS 0.32 07/23/2017    EOSABS 0.00 07/23/2017    BASOSABS 0.11 07/23/2017    DIFFTYPE NOT REPORTED 07/23/2017     CMP:    Lab Results   Component Value Date     07/27/2017    K 4.0 07/27/2017     07/27/2017    CO2 19 07/27/2017    BUN 19 07/27/2017    CREATININE 1.95 07/27/2017    GFRAA 30 07/27/2017    LABGLOM 25 07/27/2017    GLUCOSE 85 07/27/2017    PROT 5.4 07/22/2017    LABALBU 3.2 07/21/2017    CALCIUM 9.0 07/27/2017    BILITOT 0.42 07/21/2017    ALKPHOS 110 07/21/2017    AST 15 07/21/2017    ALT 9 07/21/2017     TSH:    Lab Results   Component Value Date    TSH 2.16 05/11/2017     VITAMIN B12: No components found for: B12  FOLATE:    Lab Results   Component Value Date    FOLATE >20.0 04/05/2017     IRON:    Lab Results   Component Value Date    IRON 77 07/22/2017     Iron Saturation:  No components found for: PERCENTFE  TIBC:    Lab Results   Component Value Date    TIBC 171 07/22/2017     FERRITIN:    Lab Results   Component Value Date    FERRITIN 765 07/22/2017     No results for input(s): LABIRON, TIBC, FERRITIN, HLFGDCDA50, FOLATE, OCCULTBLD in the last 72 hours. Imaging  Xr Abdomen Limited (kub)    Result Date: 7/22/2017  EXAMINATION: SUPINE VIEW(S) OF THE ABDOMEN 7/22/2017 4:03 am COMPARISON: None. HISTORY: ORDERING SYSTEM PROVIDED HISTORY: Verification of femoral central line TECHNOLOGIST PROVIDED HISTORY: Reason for exam:->Verification of femoral central line FINDINGS: The visualized bowel gas pattern is nonspecific and nonobstructive.   No abnormally

## 2017-07-27 NOTE — DISCHARGE SUMMARY
Department of [address] [place] [address], [facility].    Discharge Summary      NAME:  Med Gamino  :  1940  MRN:  [id number]    Admit date:  2017  Discharge date:  2017    Admitting Physician:  Emily Quesada MD    Primary Diagnosis on Admission:   Present on Admission:   Hypokalemia   Malnutrition ([address]   Acute on chronic kidney failure ([address]   Chronic kidney disease (CKD), stage IV (severe) ([facility]   S/P right hemicolectomy      Secondary Diagnoses:   does not have any pertinent problems on file. Admission Condition:  poor     Discharged Condition: good    Hospital Course: The patient was admitted for the management of severe malnutrition. Pt is s/p hemicolectomy since 10/2016 and has had diarrhea since then which mostly likely precipitated her acidosis and electrolyte abnormalities causing her nausea and vomiting. Due to difficult access, a fem line was placed. Nephro and GI were on board. Patient was started on rifaximin for possible bacterial overgrowth and ciprofloxacin because of UCx positive for citrobacter. She had HORTENCIA, hypokalemia, hypophosphatemia and hypomagnesemia, and acidosis. Electrolytes replaced per nephrology. TPN was started for the patient as well 2/2 poor oral intake. Patient also had low Hgb classified as AOCD but also showed reticulocytopenia on peripheral smear. Hematology is on board and would like to consider EPO as outpatient. Patient also had an acute gout attack of her right foot. Foot XR showed no fracture and diffuse swelling and Uric Acid was elevated. Started patient on colchicine 0.6 QD to be continued for 3 months then start allopurinol. After discussing that SNF may be better for the patient, she insisted on going home. She will be sent home with home health, colchicine, and imodium PRN diarrhea. Today on day of discharge pt feels better with no further complaints. Vitals and Labs are at pts baseline.   All consultants involved during this admission are agreeable to d/c. Consults:  Nephrology, cardiology, hem/onc    Significant Diagnostic/theraputic interventions: CT abd/pelvis      Disposition:   Home with home health    Instructions to Patient:     Follow up with Aldo De Oliveira MD in  1 week   CBC and BMP before follow up  Follow up with GI and hem/onc    Discharge Medications:     Porsche Valera   Home Medication Instructions LFF:[id number]    Printed on:07/27/17 1127   Medication Information                      albuterol (PROVENTIL) (2.5 MG/3ML) 0.083% nebulizer solution  Take 3 mLs by nebulization every 6 hours as needed for Wheezing             aspirin 81 MG tablet  Take 81 mg by mouth             BREO ELLIPTA 100-25 MCG/INH AEPB inhaler               colchicine (COLCRYS) 0.6 MG tablet  Take 1 tablet by mouth daily             ergocalciferol (DRISDOL) 68339 units capsule  Take 1 capsule by mouth once a week             ferrous sulfate 325 (65 Fe) MG tablet  Take 1 tablet by mouth 3 times daily (with meals)             Lactobacillus (PROBIOTIC ACIDOPHILUS) TABS  Take 1 tablet by mouth 3 times daily             loperamide (IMODIUM) 2 MG capsule  Take 1 capsule by mouth 3 times daily as needed for Diarrhea             metFORMIN (GLUCOPHAGE) 500 MG tablet  Take 500 mg by mouth 2 times daily (with meals)             ondansetron (ZOFRAN) 4 MG tablet  Take 1 tablet by mouth daily as needed for Nausea or Vomiting             potassium chloride (KLOR-CON M) 20 MEQ extended release tablet  Take 1 tablet by mouth daily             sertraline (ZOLOFT) 100 MG tablet                   Send Copies to: Aldo De Oliveira MD,       Note that over 30 minutes was spent in preparing discharge papers, discussing discharge with patient and family, medication review, etc.    Signed:  Janak Varela MD PGY 1  7/27/2017, 11:59 AM     Ms. Layton's discharge was post pond as she was found on repeat labs to have worsening anemia.  She was

## 2017-07-27 NOTE — PROGRESS NOTES
Physical Therapy  Facility/Department: [address] STEPDOWN  Daily Treatment Note  NAME: Adwoa Contreras  : 1940  MRN: [id number]    Date of Service: 2017    Patient Diagnosis(es):   Patient Active Problem List    Diagnosis Date Noted    Anemia in chronic renal disease 2017    Anemia of chronic disease 2017    Weight loss     Malnutrition ([address] 2017    Acute on chronic kidney failure ([address] 2017    Vitamin D deficiency 2017    Localized swelling of both lower legs 2017    Chronic kidney disease (CKD), stage IV (severe) ([address] 2017    Iron deficiency anemia due to chronic blood loss 2017    Hypernatremia     Metabolic acidosis     Bandemia     Debilitated patient     S/P right hemicolectomy 2016    Hypokalemia 2016    Tubulovillous adenoma of colon 2016    Atelectasis     Adenomatous polyp     Internal hemorrhoids     Lymphedema 2014    Obesity due to excess calories     Hypertension        Past Medical History:   Diagnosis Date    Adenomatous polyp 2016    HORTENCIA (acute kidney injury) ([address] 2016    DM (diabetes mellitus) [facility]     On Metformin    Hypercholesteremia     Hypertension     ON Amlodipine, Cozaar, Hydrochlorthiazide    Internal hemorrhoids     Lipoma 2016    Obesity     [facility]onia 2015    [facility]    Use of cane as ambulatory aid     as needed    Wears glasses      Past Surgical History:   Procedure Laterality Date    ABDOMINAL ADHESION SURGERY  10/20/2016    robotic assisted laparascopic    CHOLECYSTECTOMY      COLONOSCOPY  2016    adenomatous polyp; IN THE DISTAL ASCENDING COLON AREA WITH LITTLE LOOPING OF THE SCOPE THERE A A LARGE ( ABOUT 6-7 CM ) FLAT POLYPOID LESION NOTED OVER A FOLD IT WAS VILLOUS APPEARING AND IS NOT AMENABLE TO BE REMOVED COMPLETELY BY ENDOSCOPY ALONE.  MULTIPLE BIOPSIES WERE TAKEN AND PICTURES WERE TAKEN.: HEPATIC FLEXURE A LARGE LIPOMA WAS ALSO NOTED    HEMICOLECTOMY  10/20/2016    open    HYSTERECTOMY      TUMOR REMOVAL      Lt. face close to ear       Restrictions  Restrictions/Precautions  Restrictions/Precautions: Fall Risk  Required Braces or Orthoses?: No  Position Activity Restriction  Other position/activity restrictions: Up as tolerated  Subjective   General  Chart Reviewed: Yes  Family / Caregiver Present: No  Subjective  Subjective: Pt agreeable to participate  Pain Screening  Patient Currently in Pain: Yes  Pain Assessment  Pain Assessment: 0-10  Pain Level: 6 (with weight bearing)  Pain Type: Acute pain  Pain Location: Foot  Pain Orientation: Right  Vital Signs  Patient Currently in Pain: Yes       Orientation  Orientation  Overall Orientation Status: Within Normal Limits  Objective   Bed mobility  Rolling to Right: Supervision  Supine to Sit: Supervision  Scooting: Stand by assistance  Transfers  Sit to Stand: Minimal Assistance  Stand to sit: Minimal Assistance  Ambulation  Ambulation?: Yes  More Ambulation?: No  Ambulation 1  Surface: level tile  Device: Rolling Walker  Quality of Gait: slow, short steps  Distance: 8 ft to chair  Stairs/Curb  Stairs?: No     Balance  Posture: Good  Sitting - Static: Good  Sitting - Dynamic: Fair;+  Standing - Static: Fair (rw)  Comments: Sat EOB x 5 minutes, SBA           Seated LE exercise program: Long Arc Quads, hip abduction/adduction, heel/toe raises, and marches. Reps: 10   B UE bicep and tricep there ex with red tband x 10 reps   Pt educated on safety an d importance of increasing gait and mobility for home           Assessment   Body structures, Functions, Activity limitations: Decreased functional mobility ; Decreased endurance;Decreased strength  Assessment: Pt able to tolerate increased mobility today.   Pt feels she will be safe to return home with her family, would benefit from SNF due to limited gait  Prognosis: Good  Patient Education: importance OOB activities  REQUIRES PT FOLLOW UP: Yes  Activity Tolerance  Activity Tolerance: Patient limited by fatigue;Patient Tolerated treatment well  PT Equipment Recommendations  Equipment Needed: No       Discharge Recommendations:   Subacute/Skilled Nursing Facility      Goals  Short term goals  Time Frame for Short term goals: 14 visits  Short term goal 1: transfers with CGA  Short term goal 2: amb 25 ft with a RW x CGA  Short term goal 3: to be independent with bed mobility  Short term goal 4: exercise program x SBA    Plan    Plan  Times per week: 5-6x wk  Current Treatment Recommendations: Strengthening, Transfer Training, Endurance Training, Pain Management, Gait Training, Functional Mobility Training, Stair training, Safety Education & Training  Safety Devices  Type of devices:  All fall risk precautions in place, Nurse notified, Call light within reach, Gait belt, Left in chair  Restraints  Initially in place: No     Therapy Time   Individual Concurrent Group Co-treatment   Time In 1000         Time Out 1023         Minutes Rosa PT

## 2017-07-27 NOTE — PLAN OF CARE
Problem: Falls - Risk of  Goal: Absence of falls  Outcome: Met This Shift  Patient assessed for fall risk and fall precautions initiated as needed. Patient and family instructed about safety devices and allowed to make decisions related to safey. Environment kept free of clutter and adequate lighting provided. Bed in lowest position with brakes locked. Call light in reach. Patient ID band correct and in place. Patient transferred with appropriate methods. Will continue to monitor. Reyes Stevens RN    Problem: Nutrition  Goal: Optimal nutrition therapy  Outcome: Ongoing  Pt tolerating oral intake, about 50-75% of meals. Will continue to encourage oral intake.  Reyes Stevens RN

## 2017-07-27 NOTE — PROGRESS NOTES
Department of Family Medicine  Resident Progress Note  [facility], [facility].    Date:   7/27/2017  Patient name:  Gurvinder Correa  Date of admission:  7/20/2017  6:52 PM  MRN:   [id number]  YOB: 1940    SUBJECTIVE   Patient seen and examined at bedside. Pt feels overall better. She denies N/Vand is now able to eat 75%. Right foot pain has resolved after colchicine. Denies SOB, CP, dysuria, fever/chills. Patient still having bouts of diarrhea, but doing better with imodium. Patient adamantly refusing to go to SNF. Discussed with patient's sisters who stated that they can help take care of her. TPN succesfully weaned    Review of Systems:  · General: Positive for fatigue, weight loss  · Cardio: Negative for chest pain, orthopnea and PND  · Respiratory: Negative for coughing, wheezing  · GI: Negative for nausea, vomiting, diarrhea    OBJECTIVE   BP (!) 106/54  Pulse 77  Temp 98.7 °F (37.1 °C) (Oral)   Resp 16  Ht 5' 4\" (1.626 m)  Wt 161 lb 6.4 oz (73.2 kg)  SpO2 95%  BMI 27.7 kg/m2   Constitutional: No acute distress, AOx3, fem line clean, dry, intact. Receiving TPN  HEENT: atraumatic normocephalic, neck supple, symmetrical, trachea midline, no adenopathy,  no jvd, skin normal  Respiratory: clear to auscultation b/l  Cardiovascular: regular rate and rhythm, normal S1, S2, no murmur noted and 2+ pulses throughout  Abdomen: soft, nontender, nondistended, Surgical scar  Extremities:  peripheral pulses normal, no pedal edema, no clubbing or cyanosis. MSK: right foot slightly swollen. Mild tenderness rt foot on palpation    Intake/Output:    Intake/Output Summary (Last 24 hours) at 07/27/17 0950  Last data filed at 07/27/17 0506   Gross per 24 hour   Intake             2022 ml   Output                0 ml   Net             2022 ml       Laboratory Testing:  CBC:   No results for input(s): WBC, HGB, PLT in the last 72 hours.   BMP:    Recent Labs      07/25/17   0650  07/26/17   8512  07/27/17 0736   NA  138  139  142   K  3.9  3.6*  4.0   CL  100  104  109*   CO2  25  22  19*   BUN  13  16  19   CREATININE  1.91*  1.78*  1.95*   GLUCOSE  174*  170*  85     Magnesium:   Lab Results   Component Value Date    MG 2.0 07/27/2017     Phosphorus:   Lab Results   Component Value Date    PHOS 2.8 07/27/2017     Ionized Calcium:   Lab Results   Component Value Date    CAION 1.28 07/27/2017      PT/INR:    Lab Results   Component Value Date    PROTIME 11.7 01/17/2017    INR 1.1 01/17/2017     PTT:    Lab Results   Component Value Date    APTT 21.2 01/17/2017       ASSESSMENT   Principal Problem:    Malnutrition ([address]  Active Problems:    S/P right hemicolectomy    Hypokalemia    Chronic kidney disease (CKD), stage IV (severe) (HCC)    Acute on chronic kidney failure (HCC)    Weight loss    Anemia of chronic disease    Anemia in chronic renal disease      PLAN     Severe Malnutrition  - Tolerating PO intake  - Successfully weaned off TPN    UTI  - UA positive, UCx citrobacter  - resume IV cipro 406 BID    Metabolic acidosis with anion gap and resp compensation  - likely secondary to malnutrition ketosis  - resolved     HORTENCIA on CKD Stage IV  Severe electrolyte imbalance  Hypokalemia/hypomagnesemia/hypophosphatemia  - at baseline; Cr   - resolved    Diarrhea: likely functional  - relatively controlled on immodium  - Colchicine could be contributing    Foot Pain- secondary to acute gout flare up  - Elevated uric acid  - continue colchicine qd for now as patient states it is the only medication that helps her. GI symptoms may improve if colchicine could be stopped    Anemia- likely AOCD  - Iron studies consistent with AOCD  - Heme/onc consulted: agree with AOCD, continue ferrous sulfate and plan STEPHEN as outpatient      Plan for discharge today with home health and PT    This plan will be discussed with the rounding attending: Dr. Navdeep Moreland.       Babar Bhakta MD PGY-3  7/27/2017 9:50 AM

## 2017-07-27 NOTE — PROGRESS NOTES
Patient seen and examined at bedside. Reports feeling better today, states that her appetite continues to improve, she has been tolerating po intake well, denies any n/v. C/o of diarrhea which has not improved since yesterday. Patient has no other complaints at this time. Denies any chest pain, dizziness, lightheadedness, abdominal pain Physical exam is unremarkable. Plan for discharge tomorrow.     Belen Norris MD  [facility] Resident  7/26/2017 9:16 PM

## 2017-07-27 NOTE — PROGRESS NOTES
Hold discharge. Hb 5.9. Will transfuse prbc. Await hem/onc recs.  Keep central line in place until other access gained    Herlinda Garay MD PGY-3  7/27/2017 1:24 PM

## 2017-07-27 NOTE — DISCHARGE SUMMARY
Today on day of discharge pt feels better with no further complaints. Vitals and Labs are at pts baseline. All consultants involved during this admission are agreeable to d/c.     Consults:  Nephrology, cardiology, hem/onc    Significant Diagnostic/theraputic interventions: CT abd/pelvis, EGD      Disposition:   Home with home health    Instructions to Patient:     Follow up with Teresa Hartman MD in  1 week   CBC and BMP before follow up  Follow up with GI and hem/onc    Discharge Medications:     Abdoul Record   Home Medication Instructions PPT:[id number]    Printed on:07/27/17 8817   Medication Information                      albuterol (PROVENTIL) (2.5 MG/3ML) 0.083% nebulizer solution  Take 3 mLs by nebulization every 6 hours as needed for Wheezing             aspirin 81 MG tablet  Take 81 mg by mouth             BREO ELLIPTA 100-25 MCG/INH AEPB inhaler               colchicine (COLCRYS) 0.6 MG tablet  Take 1 tablet by mouth daily             ergocalciferol (DRISDOL) 13547 units capsule  Take 1 capsule by mouth once a week             ferrous sulfate 325 (65 Fe) MG tablet  Take 1 tablet by mouth 3 times daily (with meals)             Lactobacillus (PROBIOTIC ACIDOPHILUS) TABS  Take 1 tablet by mouth 3 times daily             loperamide (IMODIUM) 2 MG capsule  Take 1 capsule by mouth 3 times daily as needed for Diarrhea             metFORMIN (GLUCOPHAGE) 500 MG tablet  Take 500 mg by mouth 2 times daily (with meals)             ondansetron (ZOFRAN) 4 MG tablet  Take 1 tablet by mouth daily as needed for Nausea or Vomiting             potassium chloride (KLOR-CON M) 20 MEQ extended release tablet  Take 1 tablet by mouth daily             sertraline (ZOLOFT) 100 MG tablet                   Send Copies to: Teresa Hartman MD,       Note that over 30 minutes was spent in preparing discharge papers, discussing discharge with patient and family, medication review, etc.    Signed:  Aparna Talavera MD PGY

## 2017-07-27 NOTE — PROGRESS NOTES
Dressing: Maximum assistance (seated EOB to rhianna socks.)  Toileting: Dependent/Total (to doff/rhianna brief supine in bed.)  Balance  Sitting Balance: Supervision (seated EOB)  Standing Balance: Contact guard assistance  Standing Balance  Time: 3 minutes  Activity: functional; mobility to/from bathroom. Sit to stand: Moderate assistance  Stand to sit: Minimal assistance  Functional Mobility  Functional - Mobility Device: Standard Walker  Activity: To/from bathroom  Assist Level: Contact guard assistance  Toilet Transfers  Toilet - Technique: Ambulating  Equipment Used: Grab bars  Toilet Transfer: Contact guard assistance  Bed mobility  Rolling to Left: Stand by assistance  Rolling to Right: Stand by assistance  Supine to Sit: Contact guard assistance  Sit to Supine: Contact guard assistance  Transfers  Sit to stand: Moderate assistance  Stand to sit: Minimal assistance  Cognition  Overall Cognitive Status: WFL     Assessment   Performance deficits / Impairments: Decreased functional mobility ; Decreased ADL status; Decreased strength;Decreased endurance;Decreased balance;Decreased safe awareness;Decreased high-level IADLs  Prognosis: Good  Patient Education: OT POC, EC/WS, fall prevention, walker safety. Discharge Recommendations: Subacute/Skilled Nursing Facility  REQUIRES OT FOLLOW UP: Yes  Activity Tolerance  Activity Tolerance: Patient Tolerated treatment well;Patient limited by pain  Safety Devices  Safety Devices in place: Yes  Type of devices: Call light within reach; Patient at risk for falls; Left in bed;Nurse notified       Discharge Recommendations:  [address] [place] [address]  Times per week: 4-5x/wk  Times per day:   Current Treatment Recommendations: Strengthening, Balance Training, Functional Mobility Training, Endurance Training, Safety Education & Training, Self-Care / ADL, Patient/Caregiver Education & Training, Equipment Evaluation, Education, & procurement, Home Management

## 2017-07-28 NOTE — PROCEDURES
EGD      Patient:   Gigi Schmidt    :    1940    Facility:   [address] [place] [address]   Referring/PCP: Marquis Root MD    Procedure:   Esophagogastroduodenoscopy --diagnostic  Date:     2017   Endoscopist:  Erasto Norris MD     Indication:   Melena    Anesthesia:  MAC    Complications: None    Description of Procedure:  Informed consent was obtained from the patient after explanation of the procedure including indications, description of the procedure,  benefits and possible risks and complications of the procedure, and alternatives. Questions were answered. The patient's history was reviewed and a directed physical examination was performed prior to the procedure. Patient was monitored throughout the procedure with pulse oximetry and periodic assessment of vital signs. Patient was sedated as noted above. With the patient in the left lateral decubitus position, the Olympus videoendoscope was placed in the patient's mouth and under direct visualization passed into the esophagus. Visualization of the esophagus, stomach, and duodenum was performed during both introduction and withdrawal of the endoscope and retroflexed view of the proximal stomach was obtained. The scope was passed to the 2nd portion of the duodenum. The patient tolerated the procedure well and was taken to the recovery area in good condition. Findings[de-identified]   Esophagus: erosive esophagitis, small hiatal hernia. Stomach: normal  Duodenum: normal    No signs or bleeding or stigmata of bleeding identified. Recommendations:   -Monitor H&H q12  -If patient shows signs of bleeding, perform a bleeding scan.  -Start oral PPI daily.  -Antireflux lifestyle. -GERD diet: avoid fried and fatty foods.  peppermint, chocolate, alcohol, coffee, citrus fruits and juices, tomoato products; avoid lying down for 2 to 3 hours after eating.  -Follow up with primary care physician    Please call for any questions or

## 2017-07-28 NOTE — PLAN OF CARE
Problem: Falls - Risk of  Goal: Absence of falls  Outcome: Met This Shift  Pt remains free from falls. Pt assessed as high fall risk. Fall precautions in place including fall star posted, bed locked in lowest position, non skid slippers in place, bed alarm activated, side rails x2, call light in reach and room free from clutter. Will continue to monitor and reinforce use of call light with hourly rounding.

## 2017-07-28 NOTE — PROGRESS NOTES
Physical Therapy  Facility/Department: [facility] 4B STEPDOWN  Daily Treatment Note  NAME: Giancarlo Rosales  : 1940  MRN: [id number]    Date of Service: 2017    Patient Diagnosis(es):   Patient Active Problem List    Diagnosis Date Noted    Anemia in chronic renal disease 2017    Anemia of chronic disease 2017    Weight loss     Malnutrition ([address] 2017    Acute on chronic kidney failure ([address] 2017    Vitamin D deficiency 2017    Localized swelling of both lower legs 2017    Chronic kidney disease (CKD), stage IV (severe) ([address] 2017    Iron deficiency anemia due to chronic blood loss 2017    Hypernatremia     Metabolic acidosis     Bandemia     Debilitated patient     S/P right hemicolectomy 2016    Hypokalemia 2016    Tubulovillous adenoma of colon 2016    Atelectasis     Adenomatous polyp     Internal hemorrhoids     Lymphedema 2014    Obesity due to excess calories     Hypertension        Past Medical History:   Diagnosis Date    Adenomatous polyp 2016    HORTENCIA (acute kidney injury) ([facility] 2016    CAD (coronary artery disease)     Cancer (HCC)     DM (diabetes mellitus) ([facility]     On Metformin    Hypercholesteremia     Hypertension     ON Amlodipine, Cozaar, Hydrochlorthiazide    Internal hemorrhoids     Lipoma 2016    Obesity     Pneumonia 2015    [facility]    Use of cane as ambulatory aid     as needed    Wears glasses      Past Surgical History:   Procedure Laterality Date    ABDOMINAL ADHESION SURGERY  10/20/2016    robotic assisted laparascopic    CHOLECYSTECTOMY      COLONOSCOPY  2016    adenomatous polyp; IN THE DISTAL ASCENDING COLON AREA WITH LITTLE LOOPING OF THE SCOPE THERE A A LARGE ( ABOUT 6-7 CM ) FLAT POLYPOID LESION NOTED OVER A FOLD IT WAS VILLOUS APPEARING AND IS NOT AMENABLE TO BE REMOVED COMPLETELY BY ENDOSCOPY ALONE.  MULTIPLE BIOPSIES WERE

## 2017-07-28 NOTE — PLAN OF CARE
Problem: Risk for Impaired Skin Integrity  Goal: Tissue integrity - skin and mucous membranes  Structural intactness and normal physiological function of skin and  mucous membranes. Outcome: Ongoing  Patient remains free from falls this shift. Fall precautions in place. Falling star posted. Pt is high fall risk. Pt is alert, oriented, and up with 2 max assist. Bed alarm activated. Family at bedside. Bed is locked and in lowest position. Call light within reach and patient uses appropriately. Non skid footwear applied. Room free from clutter. Will continue to monitor with hourly rounding. Problem: Pain:  Goal: Pain level will decrease  Pain level will decrease   Outcome: Ongoing  PRN pain medication given as ordered for pain. Patient satisfied with current pain medication and states a decrease in 0-10 pain level after each administration.

## 2017-07-28 NOTE — PROGRESS NOTES
Date: 7/22/2017  EXAMINATION: SUPINE VIEW(S) OF THE ABDOMEN 7/22/2017 4:03 am COMPARISON: None. HISTORY: ORDERING SYSTEM PROVIDED HISTORY: Verification of femoral central line TECHNOLOGIST PROVIDED HISTORY: Reason for exam:->Verification of femoral central line FINDINGS: The visualized bowel gas pattern is nonspecific and nonobstructive. No abnormally dilated loops of bowel are seen. There are clips from a cholecystectomy. There has been placement of right femoral central venous line seen with tip in the region of the mid common iliac vein. 1. Right femoral central venous line placement with tip in the region of the mid right common iliac vein. Xr Chest Portable    Result Date: 7/22/2017  EXAMINATION: SINGLE VIEW OF THE CHEST 7/22/2017 3:17 am COMPARISON: 01/17/2017. HISTORY: ORDERING SYSTEM PROVIDED HISTORY: Central Venous Catheter Placement and rule out pneumothorax TECHNOLOGIST PROVIDED HISTORY: Reason for exam:->Central Venous Catheter Placement and rule out pneumothorax FINDINGS: There are low lung volumes. The heart size and pulmonary vasculature are within normal limits. There is increased density involving the right lower lung zone. There has been removal of the right jugular central venous catheter. No new central venous catheter is seen. No pneumothorax is noted. 1. Interval removal of the right central venous line. 2. No new central venous line. 3. Right basilar atelectasis. Us Retroperitoneal Complete    Result Date: 7/22/2017  EXAMINATION: RETROPERITONEAL ULTRASOUND OF THE KIDNEYS AND URINARY BLADDER 7/21/2017 COMPARISON: None HISTORY: ORDERING SYSTEM PROVIDED HISTORY: rule out hydronephrosis No additional history provided FINDINGS: Kidneys: The right kidney measures 9.7 cm in length and the left kidney measures 8.6 cm in length. Kidneys demonstrate normal cortical echogenicity. No evidence of hydronephrosis or intrarenal stones.   There is a cystic lesion within the upper pole the left kidney measuring 2.0 x 1.7 x 1.6 cm. Bladder: Urinary bladder is decompressed around a Issa catheter and not well visualized. Small left upper pole renal cyst.  Otherwise, unremarkable ultrasound of the kidneys. Urinary bladder decompressed around a Issa catheter and suboptimally visualized. Assessment    1. Normocytic anemia, likely multifactorial, Anemia of renal disease and nutritional deficiency  2. HORTENCIA on CKD grade III  3. Malnutrition  4. UTI positive for citrobacter  5. Stool occult blood negative, had a dark brown stool yesterday but pt on oral Iron, check       Haptoglobin, LDH, Retic count r/o hemolysis. Plan     EGD is negative  No clinical evidence of bleeding hemolytic workup is negative  We will continue to follow.    Plan EPO as outpatient  If anemia persist and no other source is identfied , plan bone marrow

## 2017-07-29 NOTE — PROGRESS NOTES
Department of Family Medicine  Resident Progress Note  [facility], [facility].    Date:   7/29/2017  Patient name:  Lory Fernández  Date of admission:  7/20/2017  6:52 PM  MRN:   [id number]  YOB: 1940    SUBJECTIVE   Patient seen and examined at bedside. Pt feels overall better. She denies N/V and is still able to eat 75% of her tray. Right foot pain greatly improved. Denies SOB, CP, dysuria, fever/chills. Patient's Hb has been stable since 2 units prbc transfusion 2 days ago, EGD yesterday revealed erosive esophagitis. Patient still having loose stools, but reports them as being more formed today. Review of Systems:  · General: Positive for fatigue, weight loss  · Cardio: Negative for chest pain, orthopnea and PND  · Respiratory: Negative for coughing, wheezing  · GI: Negative for nausea, vomiting. Admits to loose stools    OBJECTIVE   /74  Pulse 84  Temp 98.6 °F (37 °C) (Oral)   Resp 16  Ht 5' 4\" (1.626 m)  Wt 168 lb 9.6 oz (76.5 kg)  SpO2 100%  BMI 28.94 kg/m2   Constitutional: No acute distress, AOx3, fem line clean, dry, intact. HEENT: atraumatic normocephalic, neck supple, symmetrical, trachea midline, no adenopathy,  no jvd, skin normal. Mild scleral pallor present  Respiratory: clear to auscultation b/l  Cardiovascular: regular rate and rhythm, normal S1, S2, no murmur noted and 2+ pulses throughout  Abdomen: soft, nontender, nondistended, Surgical scar  Extremities:  peripheral pulses normal, no pedal edema, no clubbing or cyanosis. MSK: right foot slightly swollen.  Mild tenderness rt foot on palpation    Intake/Output:    Intake/Output Summary (Last 24 hours) at 07/29/17 0821  Last data filed at 07/29/17 0246   Gross per 24 hour   Intake             1260 ml   Output             1100 ml   Net              160 ml       Laboratory Testing:  CBC:   Recent Labs      07/27/17   1010   07/27/17   2023   07/28/17   2107   WBC  9.1   --    --    --    --    HGB  6.2*   < > 7.4*   < >  9.1*   PLT  226   --   Platelet clumps present, count appears decreased. --    --     < > = values in this interval not displayed. BMP:    Recent Labs      07/27/17   0736  07/28/17   0743   NA  142  142   K  4.0  3.7   CL  109*  110*   CO2  19*  18*   BUN  19  17   CREATININE  1.95*  1.97*   GLUCOSE  85  80     Magnesium:   Lab Results   Component Value Date    MG 1.8 07/28/2017     Phosphorus:   Lab Results   Component Value Date    PHOS 3.2 07/28/2017     Ionized Calcium:   Lab Results   Component Value Date    CAION 1.23 07/28/2017      PT/INR:    Lab Results   Component Value Date    PROTIME 11.7 01/17/2017    INR 1.1 01/17/2017     PTT:    Lab Results   Component Value Date    APTT 21.2 01/17/2017       ASSESSMENT   Principal Problem:    Malnutrition ([address]  Active Problems:    S/P right hemicolectomy    Hypokalemia    Chronic kidney disease (CKD), stage IV (severe) (HCC)    Acute on chronic kidney failure (HCC)    Weight loss    Anemia of chronic disease    Anemia in chronic renal disease      PLAN     Severe Malnutrition  - Tolerating PO intake  - Successfully weaned off TPN    UTI  - UA positive, UCx citrobacter  - On IV cipro 121 BID    Metabolic acidosis with anion gap and resp compensation  - likely secondary to malnutrition ketosis  - resolved     HORTENCIA on CKD Stage IV  Severe electrolyte imbalance  Hypokalemia/hypomagnesemia/hypophosphatemia  - at baseline; Cr   - resolved    Diarrhea: likely functional  - relatively controlled on immodium  - Colchicine could be contributing    Foot Pain- secondary to acute gout flare up  - Elevated uric acid  - Foot pain improved significantly  - continue colchicine qd for now as patient states it is the only medication that helps her. GI symptoms may improve if colchicine could be stopped.     Anemia- AOCD with possible UGI bleed  - Iron studies consistent with AOCD  - S/P 2 units PRBC transfusion  - Heme/onc on board: will follow labs  - EGD: unremarkable aside from erosive esophagitis: will continue po PPI outpatient  - H/H has been stable, if remains stable throughout the day will discharge home with outpatient EPO plan with hem/onc      Plan for discharge when OK with GI and and Hem/Onc    This plan will be discussed with the rounding attending: Dr. Mukund Leal MD PGY-3  7/29/2017 8:21 AM

## 2017-07-29 NOTE — PROGRESS NOTES
_                   Date:                           7/29/2017  Patient name:           Curt Phelan  Date of admission:  7/20/2017  6:52 PM  MRN:   [id number]  YOB: 1940  PCP:                           Panda Stahl MD          Subjective:         Denies any fresh complaints  Hb 9.2    Problem Lists:   Primary Problem:  Malnutrition ([facility]   Current Problems:  Active Hospital Problems    Diagnosis Date Noted    Anemia in chronic renal disease [N18.9, D63.1] 07/24/2017    Anemia of chronic disease [D63.8] 07/22/2017    Weight loss [R63.4]     Malnutrition ([facility] [E46] 07/20/2017    Acute on chronic kidney failure ([facility] [N17.9, N18.9] 07/20/2017    Chronic kidney disease (CKD), stage IV (severe) ([facility] [N18.4] 06/13/2017    Hypokalemia [E87.6] 12/06/2016    S/P right hemicolectomy [Z90.49] 12/06/2016     PMH:  Past Medical History:   Diagnosis Date    Adenomatous polyp 08/31/2016    HORTENCIA (acute kidney injury) ([facility] 12/6/2016    CAD (coronary artery disease)     Cancer (HCC)     DM (diabetes mellitus) (HCC)     On Metformin    Hypercholesteremia     Hypertension     ON Amlodipine, Cozaar, Hydrochlorthiazide    Internal hemorrhoids     Lipoma 08/31/2016    Obesity     [facility]/17/2015    [facility]    Use of cane as ambulatory aid     as needed    Wears glasses       Allergies: No Known Allergies     Medications:   Scheduled Meds:   folic acid  1 mg Oral Daily    colchicine  0.6 mg Oral Daily    ciprofloxacin  200 mg Intravenous Q24H    midodrine  10 mg Oral TID WC    sodium chloride flush  10 mL Intravenous Q12H    lidocaine PF  5 mL Intradermal Once    sodium chloride flush  10 mL Intravenous 2 times per day    potassium chloride  40 mEq Oral Once    vitamin D  50,000 Units Oral Weekly    ferrous sulfate  325 mg Oral TID WC    sodium chloride flush  10 mL Intravenous 2 times per day    heparin (porcine)  5,000 Units Subcutaneous Once     PRN Medications: HYDROcodone 5 Date: 7/22/2017  EXAMINATION: SUPINE VIEW(S) OF THE ABDOMEN 7/22/2017 4:03 am COMPARISON: None. HISTORY: ORDERING SYSTEM PROVIDED HISTORY: Verification of femoral central line TECHNOLOGIST PROVIDED HISTORY: Reason for exam:->Verification of femoral central line FINDINGS: The visualized bowel gas pattern is nonspecific and nonobstructive. No abnormally dilated loops of bowel are seen. There are clips from a cholecystectomy. There has been placement of right femoral central venous line seen with tip in the region of the mid common iliac vein. 1. Right femoral central venous line placement with tip in the region of the mid right common iliac vein. Xr Chest Portable    Result Date: 7/22/2017  EXAMINATION: SINGLE VIEW OF THE CHEST 7/22/2017 3:17 am COMPARISON: 01/17/2017. HISTORY: ORDERING SYSTEM PROVIDED HISTORY: Central Venous Catheter Placement and rule out pneumothorax TECHNOLOGIST PROVIDED HISTORY: Reason for exam:->Central Venous Catheter Placement and rule out pneumothorax FINDINGS: There are low lung volumes. The heart size and pulmonary vasculature are within normal limits. There is increased density involving the right lower lung zone. There has been removal of the right jugular central venous catheter. No new central venous catheter is seen. No pneumothorax is noted. 1. Interval removal of the right central venous line. 2. No new central venous line. 3. Right basilar atelectasis. Us Retroperitoneal Complete    Result Date: 7/22/2017  EXAMINATION: RETROPERITONEAL ULTRASOUND OF THE KIDNEYS AND URINARY BLADDER 7/21/2017 COMPARISON: None HISTORY: ORDERING SYSTEM PROVIDED HISTORY: rule out hydronephrosis No additional history provided FINDINGS: Kidneys: The right kidney measures 9.7 cm in length and the left kidney measures 8.6 cm in length. Kidneys demonstrate normal cortical echogenicity. No evidence of hydronephrosis or intrarenal stones.   There is a cystic lesion within the upper pole

## 2017-07-29 NOTE — CARE COORDINATION
Transition Planning  Call from [facility]: SANDEE MCDONALD- asked that order & [employer] be faxed- done.

## 2017-07-29 NOTE — PROGRESS NOTES
Pt in bed awake and alert. No c/o pain no s/s of distress noted with assessment. HOB up, IV nexium infusing via R femoral line. Safety maintained.

## 2017-07-29 NOTE — PROGRESS NOTES
Pt. In bed resting quietly, nexium gtt infusing. Pt. Is able to reposition self. Incontinent care provided q 2 and prn. Safety maintained.

## 2017-08-10 NOTE — TELEPHONE ENCOUNTER
Patient called indicating that she was out of her potassium medicine and that it was not at the [address] on [place] that she goes to. After looking this up, I explained that the medication was sent to the [facility] on [address]. Patient said, NO, she doesn't go there, and she has no way to get her medicine from there. Patient stated out of med and its a very important that she can't miss. There was another prescription that she needed that also was not at the [address]   Please contact the patient regarding her meds

## 2017-08-16 PROBLEM — N18.9 ACUTE ON CHRONIC KIDNEY FAILURE (HCC): Status: RESOLVED | Noted: 2017-01-01 | Resolved: 2017-01-01

## 2017-08-16 PROBLEM — E87.0 HYPERNATREMIA: Status: RESOLVED | Noted: 2017-01-01 | Resolved: 2017-01-01

## 2017-08-16 PROBLEM — E87.20 METABOLIC ACIDOSIS: Status: RESOLVED | Noted: 2017-01-01 | Resolved: 2017-01-01

## 2017-08-16 PROBLEM — R22.43 LOCALIZED SWELLING OF BOTH LOWER LEGS: Status: RESOLVED | Noted: 2017-01-01 | Resolved: 2017-01-01

## 2017-08-16 PROBLEM — N17.9 ACUTE ON CHRONIC KIDNEY FAILURE (HCC): Status: RESOLVED | Noted: 2017-01-01 | Resolved: 2017-01-01

## 2017-08-24 NOTE — TELEPHONE ENCOUNTER
Nurse called and state pt is having left leg pain from thigh to foot, with nausea and vomiting, no appetite they called to let you know she will schedule an appt for her to be seen, by whom ever have an open for a same day, just wanted you to know, thanks writer.

## 2017-10-04 NOTE — TELEPHONE ENCOUNTER
Pt called because she says she thinks her hemoglobin is getting low and she does not want to go into the hospital so she'd like an order form to get her hemoglobin checked before she came in for her appt.

## 2017-10-06 NOTE — TELEPHONE ENCOUNTER
[facility] stating that they were calling to see if you would follow the patient for homecare. I told her yes per Micheal johnson. She told me once before that when home health agencies call asking if the patient would be followed for home care to say yes. Was that okay?

## 2017-10-11 NOTE — PROGRESS NOTES
I agree with the Care Coordinator's Plan of Care   Electronically signed by Butch Robles MD on 10/11/2017 at 4:09 PM

## 2017-10-11 NOTE — PROGRESS NOTES
Visit Information    Have you changed or started any medications since your last visit including any over-the-counter medicines, vitamins, or herbal medicines? no   Are you having any side effects from any of your medications? -  no  Have you stopped taking any of your medications? Is so, why? -  no    Have you seen any other physician or provider since your last visit? No  Have you had any other diagnostic tests since your last visit? No  Have you been seen in the emergency room and/or had an admission to a hospital since we last saw you? No  Have you had your routine dental cleaning in the past 6 months? no    Have you activated your [employer] account? If not, what are your barriers?  No: refused     Patient Care Team:  Gurpreet Vance MD as PCP - General (Family Medicine)  Gurpreet Vance MD as PCP - S Attributed Provider  Elia Florez MD (Pain Management)  Jocelyne Joseph MD as Consulting Physician (Gastroenterology)  Hammad Juarez as Care Coordinator    Medical History Review  Past Medical, Family, and Social History reviewed and does not contribute to the patient presenting condition    Health Maintenance   Topic Date Due    Diabetic foot exam  07/18/1952    Diabetic retinal exam  07/18/1952    DTaP/Tdap/Td vaccine (1 - Tdap) 07/18/1961    Pneumococcal high/highest risk (1 of 2 - PCV13) 07/18/2007    Flu vaccine (1) 09/01/2017    Lipid screen  04/05/2018    Diabetic hemoglobin A1C test  06/13/2018    Colon Cancer Screen FIT/FOBT  07/27/2018    Zostavax vaccine  Addressed    DEXA (modify frequency per FRAX score)  Addressed

## 2017-10-11 NOTE — CARE COORDINATION
Ambulatory Care Coordination Note  10/11/2017  CM Risk Score: 14  Salvador Mortality Risk Score: 43.57    ACC: Merle Perez    Summary Note: CC met with Nicky Wagner and her room mate Juan Dotson at her appointment this AM. She is accepting of care coordination. CC worked with Marguerite Anand and Dr. Vinicio Baron to get her medications blister packed at [facility] and delivered today. She is out of medications as of today. Nicky Wagner does not care for the taste of Ensure or Boost. CC suggested and explained drinking Pope instant breakfast drinks between meals to increase her nutritional intake. She stated she would try this. She will also attempt to increase her daily water intake. CC will f/u in 1 week to see how her oral intake is going. Ambulatory Care Coordination Assessment    Care Coordination Protocol  Program Enrollment:  Complex Care  Referral from Primary Care Provider:  No  Week 1 - Initial Assessment     Do you have all of your prescriptions and are they filled?:  Yes  Barriers to medication adherence:  None  Are you able to afford your medications?:  Yes  How often do you have trouble taking your medications the way you have been told to take them?:  I always take them as prescribed. Do you have Home O2 Therapy?:  No      Ability to seek help/take action for Emergent Urgent situations i.e. fire, crime, inclement weather or health crisis. :  Independent  Ability to ambulate to restroom:  Independent  Ability handle personal hygeine needs (bathing/dressing/grooming):  Needs Assistance  Ability to manage Medications:  Needs Assistance  Ability to prepare Food Preparation:  Needs Assistance  Ability to maintain home (clean home, laundry):  Needs Assistance  Ability to drive and/or has transportation:  Dependent  Ability to do shopping:  Needs Assistance  Is patient able to live independently?:  No     Current Housing:  Private Residence        Per the Fall Risk Screening, did the patient have 2 or more falls or 1 fall with injury in the past year?:  Yes  How often do you think you are about to fall and you do NOT fall? For example, you grab something to stabilize yourself or hold onto a wall/furniture?:  Rarely  Use of a Mobility Aid:  Yes  Difficulty walking/impaired gait:  No  Issues with feet or shoes like numbness, edema, shoes not fitting:  No  Changes in vision, poor vision or poor lighting in environment:  No  Dizziness:  No  Other Fall Risk:  Yes  What other fall risks does the patient have?:  fatigue, deconditioned        Are you experiencing loss of meaning?:  No  Are you experiencing loss of hope and peace?:  No     Thinking about your patient's physical health needs, are there any symptoms or problems (risk indicators) you are unsure about that require further investigation?:  No identified areas of uncertainly or problems already being investigated   Are the patients physical health problems impacting on their mental well-being?:  Mild impact on mental well-being e.g. \"\"feeling fed-up\"\", \"\"reduced enjoyment\"\"   Are there any problems with your patients lifestyle behaviors (alcohol, drugs, diet, exercise) that are impacting on physical or mental well-being?:  Some mild concern of potential negative impact on well-being   Do you have any other concerns about your patients mental well-being?  How would you rate their severity and impact on the patient?:  No identified areas of concern   How would you rate their home environment in terms of safety and stability (including domestic violence, insecure housing, neighbor harassment)?:  Consistently safe, supportive, stable, no identified problems   How do daily activities impact on the patient's well-being? (include current or anticipated unemployment, work, caregiving, access to transportation or other):  Some general dissatisfaction but no concern   How would you rate their social network (family, work, friends)?:  Adequate participation with social networks   How

## 2017-10-12 NOTE — TELEPHONE ENCOUNTER
Tamika From [facility] called to say that they are going to be doing occupational therapy with the patient 1 time a week for 1 week. 2 times a week for 2 weeks and 1 time a week for one week.

## 2017-10-13 NOTE — CARE COORDINATION
Ambulatory Care Coordination Note  10/13/2017  CM Risk Score: 14  Salvador Mortality Risk Score: 43.57    ACC: Mal Tomeka    Summary Note: Toma Lara reports she received her medication blister packs from [address] [place] [address] last evening. Care Coordination Interventions    Program Enrollment:  Complex Care  Referral from Primary Care Provider:  No  Suggested Interventions and Community Resources  Medi Set or Pill Pack:  Completed (Comment: Medications blister packed at [address] [place] [address])  Occupational Therapy:  Completed  Physical Therapy:  Completed         Goals Addressed     None          Prior to Admission medications    Medication Sig Start Date End Date Taking?  Authorizing Provider   potassium chloride (KLOR-CON M) 20 MEQ extended release tablet Take 1 tablet by mouth daily 10/11/17   Rolo Lew MD   aspirin 81 MG tablet Take 1 tablet by mouth daily 10/11/17   Rolo Lew MD   ergocalciferol (DRISDOL) 10961 units capsule Take 1 capsule by mouth once a week 10/11/17   Rolo Lew MD   ferrous sulfate 325 (65 Fe) MG tablet Take 1 tablet by mouth 3 times daily (with meals) 10/11/17   Rolo Lew MD   folic acid (FOLVITE) 1 MG tablet Take 1 tablet by mouth daily 10/11/17   Rolo Lew MD   Lactobacillus (PROBIOTIC ACIDOPHILUS) TABS Take 1 tablet by mouth 3 times daily 10/11/17   Rolo Lew MD   omeprazole (PRILOSEC) 40 MG delayed release capsule Take 1 capsule by mouth daily 10/11/17   Rolo Lew MD   ondansetron [facility]) 4 MG tablet Take 1 tablet by mouth every 12 hours as needed for Nausea or Vomiting 10/11/17   Rolo Lew MD   sertraline (ZOLOFT) 100 MG tablet Take 1 tablet by mouth daily 10/11/17   Rolo Lew MD   pravastatin (PRAVACHOL) 20 MG tablet Take 1 tablet by mouth every evening 10/11/17   Rolo Lew MD   budesonide-formoterol [facility]) 160-4.5 MCG/ACT AERO Inhale 2 puffs into the lungs 2 times daily 10/11/17   Darroll Bound Vinicio Baron MD       Future Appointments  Date Time Provider Jeff Tomas   1/19/2018 10:20 AM Kelton Ma MD  [facility]

## 2017-12-06 NOTE — TELEPHONE ENCOUNTER
Farida called from [facility]. Patient is no longer on dialysis per Dr. Kathrin Altamirano. She said an appointment needed to be scheduled for 1 week. She is scheduled to be seen 12/14/17 @ 8:40 @ main office.

## 2017-12-08 NOTE — PROGRESS NOTES
0825 Pt arrives via wheelchair for dialysis cath removal. Procedure explained to pt and questions answered. PT RIGHTS AND RESPONSIBILITIES OFFERED TO PT.  8724 According to nursing home MAR, pt did not hold aspirin for 5 days. Sharlene Moreno in [facility] notified. Sharlene Moreno states to ask pt where it was put in and how long ago. Pt unable to answer questions. 0900 Estelita at [facility] that aspirin was not held and she would call pt's daughter and find out where and when the catheter was placed. [address] M[address]oy from [facility] called and stated it was okay to remove dialysis cath. 0221 estelita from [place] states that she called Dr. Donavon Hua office and they stated she got it put in at [facility] around November 2.   1024 Pt to [facility] via bed. 1115 Pt eating turkey sandwich. Vitals stable. Dressing unchanged. 1130 Pt denies needs at this time. Dressing unchanged. Pt has no pain at this time. 1135 Report called to [facility] at [facility] and discharge instructions explained. Vidya verbalized understanding. 1145 Pt placed on bed pan and had small loose bowel movement. Depends changed. 1230 Pt discharged via wheelchair with instructions with no complaints. Dressing unchanged.     __m__ Safety:       (Environmental)   [place] to environment   Ensure ID band is correct and in place/ allergy band as needed   Assess for fall risk   Initiate fall precautions as applicable (fall band, side rails, etc.)   Call light within reach   Bed in low position/ wheels locked    _m___ Pain:        Assess pain level and characteristics   Administer analgesics as ordered   Assess effectiveness of pain management and report to MD as needed    _m___ Knowledge Deficit:   Assess baseline knowledge   Provide teaching at level of understanding   Provide teaching via preferred learning method   Evaluate teaching effectiveness    __m__ Hemodynamic/Respiratory Status:       (Pre and Post Procedure Monitoring)   Assess/Monitor vital signs and LOC   Assess Baseline SpO2 prior to any sedation   Obtain weight/height   Assess vital signs/ LOC until patient meets discharge criteria   Monitor procedure site and notify MD of any issues

## 2017-12-14 PROBLEM — N18.30 ANEMIA IN STAGE 3 CHRONIC KIDNEY DISEASE (HCC): Status: ACTIVE | Noted: 2017-01-01

## 2017-12-14 NOTE — TELEPHONE ENCOUNTER
Verbal per Dr. Jackie Saenz. The patient is to have a BMP done today. Dr. Mejia Going wants to know the results when we get them.

## 2017-12-15 NOTE — ED NOTES
Pt placed on and removed from bedpan. Urine sent to lab. Denies all other needs. Family at side. Call light in reach. Lab at bedside.       Quiana Avitia RN  12/15/17 [id number]

## 2017-12-15 NOTE — ED PROVIDER NOTES
[facility]  eMERGENCY dEPARTMENT eNCOUnter          CHIEF COMPLAINT       Chief Complaint   Patient presents with    Other     abnormal lab     Nurses Notes reviewed and I agree except as noted in the HPI. HISTORY OF PRESENT ILLNESS    Rosio Up is a 78 y.o. female with a past medical history of HORTENCIA, CAD, DM, and TN who presents to the ED for evaluation of abnormal lab results. The patient was seen in office by Dr. Oseas Madison (Nephrology) yesterday for a routine follow up. Outpatient labs were completed this morning due to previous hyperkalemia and acidosis on her previous lab results. The results came back this morning with an elevated potassium to 6.0. She was sent to the ED today for repeat labs and abnormal CO2 levels at the nursing home. The patient currently denies any symptoms or complaints. No chest pain, palpitations, abdominal pain, nausea, emesis, diarrhea, urinary symptoms, fevers, or chills. She has continued to have normal urine output. No additional complaints or concerns at the time of initial evaluation. REVIEW OF SYSTEMS     Review of Systems   Constitutional: Negative for appetite change, chills, fatigue and fever. HENT: Negative for congestion, ear pain, rhinorrhea and sore throat. Eyes: Negative for pain, discharge and visual disturbance. Respiratory: Negative for cough, shortness of breath and wheezing. Cardiovascular: Negative for chest pain, palpitations and leg swelling. Gastrointestinal: Negative for abdominal pain, diarrhea, nausea and vomiting. Genitourinary: Negative for difficulty urinating, dysuria and vaginal discharge. Musculoskeletal: Negative for arthralgias, back pain, joint swelling and neck pain. Skin: Negative for pallor and rash. Neurological: Negative for dizziness, syncope, weakness, light-headedness and headaches. Hematological: Negative for adenopathy.    Psychiatric/Behavioral: Negative for confusion, dysphoric mood and suicidal ideas. The patient is not nervous/anxious. PAST MEDICAL HISTORY    has a past medical history of Adenomatous polyp; HORTENCIA (acute kidney injury) ([facility] CAD (coronary artery disease); [facility] DM (diabetes mellitus) ([facility] Hypercholesteremia; Hypertension; Internal hemorrhoids; Lipoma; Obesity; Pneumonia; Use of cane as ambulatory aid; and Wears glasses. SURGICAL HISTORY      has a past surgical history that includes Hysterectomy; Colonoscopy (08/31/2016); Cholecystectomy; tumor removal; hemicolectomy (10/20/2016); Abdominal adhesion surgery (10/20/2016); and esophagogastroduodenoscopy transoral diagnostic (N/A, 7/28/2017). CURRENT MEDICATIONS       Current Discharge Medication List      CONTINUE these medications which have NOT CHANGED    Details   megestrol (MEGACE ORAL) 40 MG/ML suspension Take 200 mg by mouth daily      phenytoin (DILANTIN) 100 MG ER capsule Take 100 mg by mouth 3 times daily      oxyCODONE-acetaminophen (PERCOCET) 5-325 MG per tablet Take 1 tablet by mouth every 8 hours as needed for Pain .       loperamide (LOPERAMIDE A-D) 2 MG tablet Take 2 mg by mouth every 2 hours as needed for Diarrhea      albuterol (PROVENTIL) (2.5 MG/3ML) 0.083% nebulizer solution Take 2.5 mg by nebulization every 6 hours as needed for Wheezing      fluticasone 100 MCG/ACT AEPB Inhale into the lungs daily      colchicine (COLCRYS) 0.6 MG tablet Take 0.6 mg by mouth daily      amLODIPine (NORVASC) 5 MG tablet Take 1 tablet by mouth daily  Qty: 30 tablet, Refills: 3      potassium chloride (KLOR-CON M) 20 MEQ extended release tablet Take 1 tablet by mouth daily  Qty: 30 tablet, Refills: 3      aspirin 81 MG tablet Take 1 tablet by mouth daily  Qty: 30 tablet, Refills: 3      ergocalciferol (DRISDOL) 51890 units capsule Take 1 capsule by mouth once a week  Qty: 12 capsule, Refills: 0    Associated Diagnoses: Vitamin D deficiency      ferrous sulfate 325 (65 Fe) MG tablet Take 1 tablet by mouth 3 times °C). Her blood pressure is 120/68 and her pulse is 92. Her respiration is 16 and oxygen saturation is 100%. Physical Exam   Constitutional: She is oriented to person, place, and time. Vital signs are normal. She appears well-developed and well-nourished. She is cooperative. Non-toxic appearance. She does not appear ill. HENT:   Head: Normocephalic. Right Ear: External ear normal. No drainage. Left Ear: External ear normal. No drainage. Nose: Nose normal. No epistaxis. Mouth/Throat: Mucous membranes are not dry and not cyanotic. Eyes: Conjunctivae and EOM are normal. Right eye exhibits no discharge. Left eye exhibits no discharge. No scleral icterus. Neck: Trachea normal, normal range of motion and phonation normal. Neck supple. No tracheal deviation present. Cardiovascular: Normal rate, regular rhythm, normal heart sounds and intact distal pulses. Exam reveals no gallop and no friction rub. No murmur heard. Pulses:       Radial pulses are 2+ on the right side. Pulmonary/Chest: Effort normal and breath sounds normal. No stridor. No respiratory distress. She has no wheezes. She has no rales. She exhibits no tenderness. Abdominal: Soft. Bowel sounds are normal. She exhibits no distension and no pulsatile midline mass. There is no tenderness. There is no rebound and no guarding. Musculoskeletal: Normal range of motion. She exhibits no edema or tenderness (No calf pain or tenderness. No evidence of DVT). Neurological: She is alert and oriented to person, place, and time. She has normal strength. She displays no tremor. She displays no seizure activity. Coordination normal. GCS eye subscore is 4. GCS verbal subscore is 5. GCS motor subscore is 6. Skin: Skin is warm and dry. No rash (on exposed surfaced) noted. She is not diaphoretic. No cyanosis or erythema. No pallor. Psychiatric: She has a normal mood and affect.  Her speech is normal and behavior is normal.   Nursing note and vitals reviewed. DIFFERENTIAL DIAGNOSIS not limited to: Abnormal labs, hyperkalemia    DIAGNOSTIC RESULTS     EKG: All EKG's are interpreted by the Emergency Department Physician who either signs or Co-signs this chart in the absence of a cardiologist.  EKG interpreted by Cristian Bolivar MD:    Vent. Rate: 79 bpm  SC interval: 130 ms  QRS duration: 76 ms  QTc: 449 ms  P-R-T axes: 42, 6, 155  NSR.  No STEMI     RADIOLOGY: non-plain film images(s) such as CT, Ultrasound and MRI are read by the radiologist.    No orders to display        LABS:   Labs Reviewed   CBC WITH AUTO DIFFERENTIAL - Abnormal; Notable for the following:        Result Value    RBC 3.17 (*)     Hemoglobin 9.2 (*)     Hematocrit 28.8 (*)     MCHC 32.0 (*)     RDW 16.7 (*)     Eosinophils # 0.5 (*)     All other components within normal limits   BASIC METABOLIC PANEL - Abnormal; Notable for the following:     Potassium 5.6 (*)     Chloride 114 (*)     CO2 9 (*)     CREATININE 1.5 (*)     All other components within normal limits   TROPONIN - Abnormal; Notable for the following:     Troponin T 0.036 (*)     All other components within normal limits   MAGNESIUM - Abnormal; Notable for the following:     Magnesium 1.0 (*)     All other components within normal limits   URINE WITH REFLEXED MICRO - Abnormal; Notable for the following:     Protein, UA 30 (*)     Leukocyte Esterase, Urine TRACE (*)     Character, Urine CLOUDY (*)     All other components within normal limits   GLOMERULAR FILTRATION RATE, ESTIMATED - Abnormal; Notable for the following:     Est, Glom Filt Rate 41 (*)     All other components within normal limits   BLOOD GAS, ARTERIAL - Abnormal; Notable for the following:     pH, Blood Gas 7.27 (*)     PCO2 18 (*)     PO2 114 (*)     HCO3 8 (*)     Base Excess (Calculated) -16.9 (*)     All other components within normal limits   POCT GLUCOSE - Abnormal; Notable for the following:     POC Glucose 58 (*)     All other components within was dictated to the scribe in my presence, and it accurately records my words and actions.     Maria Elena Durand MD 12/15/17 2:27 AM       Maria Elena Durand MD  12/16/17 6114

## 2017-12-15 NOTE — ED NOTES
Dorothy Burkett RN in room to attempt IV. Unsuccessful. Dr. Anastasia Mosher notified that unable to obtain INT at this time. States pt can be a lab draw and will monitor results to see if INT is necessary. Will hold calcium gluconate at this time.       Amira Joel RN  12/15/17 372 [facility], RN  12/15/17 7891

## 2017-12-15 NOTE — ED NOTES
Attempted to look for IV access. Unable to find. Will have another RN attempt.       Eusebio Barclay, TAWANA  12/15/17 5264

## 2017-12-16 PROBLEM — R15.9 FECAL INCONTINENCE: Status: ACTIVE | Noted: 2017-01-01

## 2017-12-16 PROBLEM — E87.20 METABOLIC ACIDOSIS: Status: ACTIVE | Noted: 2017-01-01

## 2017-12-16 PROBLEM — K90.89 BILE SALT-INDUCED DIARRHEA: Status: ACTIVE | Noted: 2017-01-01

## 2017-12-16 PROBLEM — R19.7 DIARRHEA: Status: ACTIVE | Noted: 2017-01-01

## 2017-12-16 PROBLEM — D36.9 ADENOMATOUS POLYPS: Status: ACTIVE | Noted: 2017-01-01

## 2017-12-16 PROBLEM — E87.29 HYPERCHLOREMIC ACIDOSIS: Status: ACTIVE | Noted: 2017-01-01

## 2017-12-16 NOTE — PROGRESS NOTES
Pt admitted to  6K2 from ED. Complaints: None. IV sodium bicarb infusing into the femoral right, condition patent and no redness at a rate of 100 mls/ hour with about 700 mls in the bag still. IV site free of s/s of infection or infiltration. Vital signs obtained. Assessment and data collection initiated. Oriented to room. Policies and procedures for  explained All questions answered with no further questions at this time. Fall prevention and safety brochure discussed with patient.

## 2017-12-16 NOTE — PLAN OF CARE
Problem: Impaired respiratory status  Goal: Lung sounds clear or within normal limits for patient  Outcome: Ongoing  Patient has clear breath sounds yet diminished in the bases. Continue treatment as ordered.

## 2017-12-16 NOTE — FLOWSHEET NOTE
12/16/17 1420   Encounter Summary   Services provided to: Patient   Referral/Consult From: Rounding   Support System Unknown   Continue Visiting Yes  (12/16 continue support)   Complexity of Encounter Low   Length of Encounter 15 minutes   Routine   Type Initial   Spiritual/Scientology   Type Spiritual support   Assessment Approachable   Intervention Active listening;Nurtured hope;Prayer   Outcome Expressed gratitude;Comfort   12/16  - patient is hard of hearing.  Short visit

## 2017-12-16 NOTE — H&P
[address] [place] [address]                               HISTORY AND PHYSICAL    PATIENT NAME: Sunil Ferrara                       :        1940  MED REC NO:   [id number]                           ROOM:       0002  ACCOUNT NO:   [de-identified]                           ADMIT DATE: 12/15/2017  PROVIDER:     Kailee Cardona MD, Willie Espinal    IDENTIFICATION:  A EUBR black female. CHIEF COMPLAINT:  Diarrhea. HISTORY OF PRESENT ILLNESS:  The patient had a colonoscopy about a year  ago. She states she has had diarrhea for about 1 year with fecal  incontinence. Does not know when the stool is coming through, running  stool. No blood, no fever, cold, chills. No abdominal pain. Some weight  loss. .  She had a biopsy done of  her stomach I think by Dr. Kevin Quiroz in [facility]. It showed moderately  normocytic anemia with reticulocytopenia compatible with hypoproliferative  anemia on 2017. In , she had colonoscopy, it showed tubovillous  adenoma, submucous lipoma, margins appear viable and uninvolved. Three  lymph nodes negative for malignancy. Status post previous appendectomy. Preoperative diagnosis, ascending colon polyp. Postoperative diagnosis,  right colon and portions of terminal ileum removed, right hemicolectomy. The patient continues to have diarrhea after the surgery, was sent here by  the nephrologist because the gases showed CO2 of 9. When she arrived here,  blood pressure was 112/72, pulse 88, respirations 16, temperature 98, pulse  oximetry 97%, weight 144 pounds. PAST MEDICAL HISTORY:  Villous adenomatous polyps status post right  hemicolectomy, chronic diarrhea, chronic fecal incontinence, high  cholesterol, pneumonia, obesity, coronary artery disease.     PAST SURGICAL HISTORY:  Tumor removed left face, endoscopy, colonoscopy,  hemicolectomy 10/2016, cholecystectomy, abdominal herniorrhaphy, abdominal  adhesions and surgery. FAMILY HISTORY:  The patient came to me with her sister. Her    a long time ago. They have no children. Mother  of ovarian  cancer. Father  of cause unknown. Sister has heart disease, one  sister and a brother had heart surgery. SOCIAL HISTORY:  The patient does not smoke or drink. MEDICATIONS:  Albuterol 2.5 mg every 6 hours, Norvasc 5 mg daily, aspirin  81 mg daily, Symbicort 160/4.5 two puffs twice a day, colchicine 0.6 mg  daily, ergocalciferol 50,000 units once a week, Zofran 4 mg every 12 hours,  Percocet 5/325 every 8 hours, Dilantin 100 mg three times a day, Pravachol  20 mg daily, Megace 200 mg daily, Imodium 2 mg daily, lactobacillus 1  tablet 3 times a day, folic acid 1 mg daily. ALLERGIES:  None. CODE STATUS:  Full code. REVIEW OF SYSTEMS:  CNS:  No headaches. No dizziness. No lightheadedness. HEENT:  Eyes:  No double vision or blurred vision. Nose and Throat:  No  runny nose, postnasal drip  RESPIRATORY SYSTEM: No cough, sputum, shortness of breath, wheezing. CARDIOVASCULAR:  No chest pain or palpitations  GI:  No abdominal pain or distention. Appetite is poor. Lost some weight. No blood in the stool. Chronic diarrhea, running watery stools,  incontinence of stool, sometimes cannot tell the difference between stool  and flatus. PERIPHERAL VASCULAR SYSTEM:  No claudication or cramps. PERIPHERAL NERVOUS SYSTEM:  No neuropathy. SKIN:  No bruises, rashes, ecchymosis, or petechial hemorrhages. ENDOCRINE:  No diabetes. No thyroid disease. LYMPHATIC SYSTEM:  No peripheral lymphadenopathy. PSYCHIATRY:  Depressed. Disoriented to time, place, and person. Not  suicidal.  MUSCULOSKELETAL SYSTEM:  All extremities, no swelling, redness, or  deformity in any of the joints. CRANIAL NERVES:  Intact. GENITAL SYSTEM:  No discharge, drainage, or bleeding.     PHYSICAL EXAMINATION:  GENERAL:   She is conscious, alert, corrected unless the bile salt is  accounted for by sequestering the bile salt. 4.  She has lost some weight, underlying malignancy needs to be ruled out. PLAN:  1.  CEA, CA-125.  2.  The patient may have hypothyroidism. 3.  Other causes of weight loss, depression. 4.  The patient has a history of gout. The uric acid last done on 12/12  was 9.4.  5.  Troponins are slightly elevated, could be underlying ischemia or due to  renal failure or a combination. 6.  Iron, ferritin, B12, stool GI pathogen. 7.  Consult Dr. Gant [facility] for likely colonoscopy and endoscopy. 8.  Stool for CEA, CA-125.  9.  Hydrate the patient with lactated Ringer solution 100 mL/hr. 10.  Get lipase, amylase daily and CA 19-9. 11. DVT prophylaxis, SCDs. 12.  CBC and BMP daily. 13.  Urine culture. 14.  Partial hyperchloremic acidosis, corrected with lactated Ringer  solution. 15.  We may have to bind the bile salts with intestinal binders before we  could proceed. Sequestrant may work at this time. Hospitalist Service will follow the patient in the morning. Plan of care  explained to her. If there is any elevation in enzymes, she will need  another scope. I am ordering a review.         Karen Go MD, [facility]    D: 12/16/2017 2:18:19       T: 12/16/2017 5:01:16     [email or web address]  Job#: [id number]     Doc#: [id number]    CC:

## 2017-12-16 NOTE — PROGRESS NOTES
Renal Adjustment Per Protocol  Recent Labs      12/15/17   2000   BUN  17       Recent Labs      12/15/17   2000   CREATININE  1.5*       Estimated Creatinine Clearance: 27 mL/min (based on SCr of 1.5 mg/dL).       Plan: Change Cipro 400 mg IV Q18H     Jeb Pitts PharmD 12/16/2017 8:51 AM

## 2017-12-17 NOTE — PLAN OF CARE
Problem: Impaired respiratory status  Goal: Lung sounds clear or within normal limits for patient  Outcome: Ongoing  Breath sounds clear, pt remains on dulera bid.

## 2017-12-17 NOTE — PLAN OF CARE
Problem: Impaired respiratory status  Goal: Lung sounds clear or within normal limits for patient  Outcome: Ongoing  Patient has clear lung sounds yet diminished in the bases. Continue treatment as ordered.

## 2017-12-17 NOTE — PROGRESS NOTES
Oral BID    rifaximin  550 mg Oral BID     PRN Meds: ondansetron, oxyCODONE-acetaminophen, loperamide, albuterol      Intake/Output Summary (Last 24 hours) at 12/17/17 0953  Last data filed at 12/17/17 0400   Gross per 24 hour   Intake          4242.74 ml   Output              825 ml   Net          3417.74 ml       Diet:  DIET RENAL;    Exam:  /63   Pulse 91   Temp 98.4 °F (36.9 °C) (Oral)   Resp 16   Ht 5' 7\" (1.702 m)   Wt 120 lb (54.4 kg)   SpO2 99%   BMI 18.79 kg/m²     General appearance: No apparent distress, appears stated age and cooperative. HENT: Normocephalic and atraumatic. Oropharyngeal exam reveals normal pink moist mucosa. Eyes:Pupils equal, round, and reactive to light. Conjunctivae/corneas clear. Patient has bilateral cataracts. Neck: Supple, with full range of motion. No jugular venous distention. Trachea midline. Respiratory:  Normal respiratory effort. Clear to auscultation, bilaterally without Rales/Wheezes/Rhonchi. Cardiovascular: Regular rate and rhythm with normal S1/S2 without murmurs, rubs or gallops. Abdomen: Soft, non-tender, non-distended with normal bowel sounds. Musculoskeletal: No clubbing, cyanosis or edema bilaterally. Full range of motion without deformity. Skin: Skin color, texture, turgor normal.  No rashes or lesions. Neurologic:  Neurovascularly intact without any focal sensory/motor deficits.  Cranial nerves: II-XII intact, grossly non-focal.  Psychiatric: Alert and oriented, thought content appropriate, normal insight  Capillary Refill: Brisk,< 3 seconds   Peripheral Pulses: +2 palpable, equal bilaterally       Labs:   Recent Labs      12/15/17   2000  12/16/17   0820  12/17/17   0410   WBC  7.0  5.6  5.4   HGB  9.2*  9.2*  8.3*   HCT  28.8*  28.4*  24.8*   PLT  272  258  255     Recent Labs      12/15/17   2000  12/16/17   0820  12/17/17   0410   NA  137  140  138   K  5.6*  4.9  3.7   CL  114*  114*  104   CO2  9*  12*  21*   BUN  17  15  11

## 2017-12-17 NOTE — CONSULTS
without any rales or any crackles. No use of  accessory muscles. HEART:  S1, S2 without any S3 gallops. ABDOMEN:  Soft without any tenderness or any guarding. EXTREMITIES:  Did not reveal any pitting edema. NEUROLOGIC:  She does not have any slurred speech or any facial drooping. MUSCULOSKELETAL:  She moves all four extremities. LABORATORY DATA:  Blood work shows a hemoglobin of 9.2. Sodium of 140,  potassium of 4.9, chloride of 114, bicarbonate of 9, creatinine of 1.4,  magnesium of 1.0. Her albumin is 2.5. The patient's old records were reviewed. Her echocardiogram in 01/2017  showed ejection fraction of 65% to 70%. Previous serum creatinines have  been anywhere in the range of 1.8 to 2.0. In summary, a 43-year-old -American female with a history of chronic  kidney disease, stage III/IV, chronic diarrhea, history of hemicolectomy  secondary to adenomatous polyps, malnutrition who has had previous  recurrent acute kidney injuries from volume depletion. She presented to  the hospital with severe metabolic acidosis associated with hyperkalemia  and acute kidney injury. ASSESSMENT AND PLAN:  1. Severe metabolic acidosis. 2.  Hyperkalemia. 3.  Chronic kidney disease stage III. 4.  Diarrhea. 5.  Hypomagnesemia. 6.  History of hemicolectomy. The patient's renal issues are mostly originating secondary to volume  depletion from GI losses. The patient reports history of diarrhea ever  since she underwent hemicolectomy. She has had multiple episodes of acute  kidney injury. She has had severe electrolyte imbalance as a result of  this. She has also prior had severe metabolic acidemia and has needed  admissions for similar problems. This time, the patient presents with  similar set of problems. She was severely acidotic on arrival.  At this  time, plan is to simply continue D5W with 150 mEq of sodium bicarbonate. IV infusion for severe metabolic acidosis.   This will not only improve her  bicarbonate levels, but will also help improve her potassium levels  further. Clinically, she appears extremely dehydrated. She has poor skin  turgor and I have recommended her to increase her fluid intake. We will  continue with IV fluid hydration for now. She also presents with  hypomagnesemia which again is likely secondary to GI losses. I gave her 2  gm of magnesium sulfate last night. She will get another 2 gm of magnesium  sulfate today. We will monitor her potassium levels. We will monitor her  serum bicarbonate levels. Because she is on bicarbonate drip, we need to  monitor her calcium levels closely. My thought process was discussed with  the patient. I am unclear how much she understood. Nephrology will follow  and make further recommendations. At this time, there is no indication for  renal replacement therapy. Thank you for allowing me to participate in the care of this patient.         Marian Bey M.D.    D: 12/16/2017 15:19:13       T: 12/16/2017 19:53:45     [email or web address]  Job#: [id number]     Doc#: [id number]    CC:

## 2017-12-17 NOTE — PROGRESS NOTES
12/16/17   0820  12/17/17   0410   LABALBU  2.5*  2.3*   AST  28  18   ALT  29  20   BILITOT  0.2*  0.2*   ALKPHOS  219*  186*         Meds:  Infusion:    sodium bicarbonate infusion 100 mL/hr at 12/16/17 2240     Meds:    aspirin  81 mg Oral Daily    vitamin D  50,000 Units Oral Weekly    ferrous sulfate  325 mg Oral TID WC    folic acid  1 mg Oral Daily    lactobacillus  1 capsule Oral TID    sertraline  100 mg Oral Daily    pravastatin  20 mg Oral QPM    megestrol  200 mg Oral Daily    phenytoin  100 mg Oral TID    amLODIPine  5 mg Oral Daily    enoxaparin  40 mg Subcutaneous Daily    pantoprazole  40 mg Oral QAM AC    mometasone-formoterol  2 puff Inhalation BID    Colchicine  0.6 mg Oral Daily    ciprofloxacin  400 mg Intravenous Q18H    polycarbophil  625 mg Oral BID    rifaximin  550 mg Oral BID     Meds prn: ondansetron, oxyCODONE-acetaminophen, loperamide, albuterol       Impression and Plan:  1. Mild HORTENCIA secondary to volume depletion on CKD III  - improving renal function  - okay to stop IV bicarbonate  - change to normal saline    2. Severe met acidosis: improved sig, stop IV bicarbonate drip  - start normal saline    3. Diarrhea: improving  4. Hypomagnesemia: resolved  5.  Hypotension: stop norvasc    D/W patient     Ivelisse Thorne MD  [facility]

## 2017-12-17 NOTE — CONSULTS
pleural effusions, multiple  chronic postsurgical findings. PAST MEDICAL HISTORY:  History of flat villous adenomatous polyp status  post right hemicolectomy, chronic diarrhea after the colectomy, fecal  incontinence, hypercholesteremia, pneumonia, obesity, coronary artery  disease. 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 ADMITTING MEDICATIONS:  Albuterol, Norvasc, aspirin, Symbicort, colchicine,  ergocalciferol, Zofran, Percocet, Dilantin, Pravachol, Megace, Imodium,  lactobacillus folic acid. ALLERGIES:  None. SOCIAL HISTORY:  The patient denied any tobacco, alcohol or illicit drug  use. She moved back to [facility]. She used to live in [facility] for 49 years. FAMILY HISTORY:  Patient is . Mother  of ovarian cancer. Father  of unknown cause. One sister had heart disease. Brother had  heart surgery. REVIEW OF SYSTEMS:  12-point review of systems was reviewed. Pertinent  positives were as mentioned in HPI and past medical history; otherwise,  noncontributory. PHYSICAL EXAMINATION  VITAL SIGNS:  Weight 117 pounds, BMI 18.5 kg/m2, blood pressure 116/78,  pulse 103, respiratory rate 16, temperature 93. GENERAL:  A 68-year-old pleasant female lying in bed, well built, chronic  ill appearing female, appears to be in no acute distress. HEENT:  Normocephalic, atraumatic. Pupils are equal, round, reactive to  light. No erythema of oropharynx. NECK:  Supple. No JVD. No thyromegaly. CHEST:  No axillary or supraclavicular adenopathy. LUNGS:  Equal to expansion on inspection. Adequate air entry bilaterally  on percussion and auscultation. No added sounds. HEART:  Regular. Normal S1 and S2. No S3 or murmurs. ABDOMEN:  Soft, normoactive bowel sounds. Postsurgical scar on the  anterior abdominal wall noted. No rebound or guarding.   No palpable  masses. No appreciable hernias. RECTAL:  Examination deferred. The  patient had bowel movement. The patient had liquid green fecal material  noted. EXTREMITIES:  No clubbing, cyanosis or edema. SKIN:  No skin rash. NEUROLOGIC:  Generalized weakness. DIAGNOSTIC DATA:  As in HPI. IMPRESSION  1. A 78year-old pleasant female, who has chronic diarrhea. Stool studies  are negative for infectious colitis. Diarrhea could be secondary to loss  of the part of the colon and also the patient is at high risk for small  bowel bacterial overgrowth from an ileal resection with ileocolonic  anastomosis, less likely of malabsorption syndrome. 2.  Flat villous adenoma, status post right colectomy. 3.  Coronary artery disease. RECOMMENDATIONS:  At this time, continue Culturelle 1 tablet p.o. b.i.d. Start the patient on Imodium 2 mg 1 tablet p.o. b.i.d. After loose bowel  movement, place the patient Xifaxan 550 mg p.o. b.i.d., place the patient  on FiberCon 2 tablets p.o. b.i.d. and reassess the response. Also give the  patient _____ exercises. Thank you Dr. Joe Barron for letting me see this patient. Do not hesitate to  call me if you have any questions. My recommendations are as above.                 Lou Alfred M.D.    D: 12/16/2017 14:11:12       T: 12/16/2017 14:15:18     [email or web address]  Job#: [id number]     Doc#: [id number]    CC:  Luli Montgomery MD, Khadra Elaine M.D.

## 2017-12-17 NOTE — PLAN OF CARE
Problem: Falls - Risk of  Goal: Absence of falls  Outcome: Ongoing  No falls noted at this time. Bed in low position. Call light within reach. Problem: Tissue Perfusion - Renal, Altered:  Goal: Electrolytes within specified parameters  Electrolytes within specified parameters   Outcome: Ongoing  Sodium bicarb continued at 100ml/hr. Mg replaced today. Redraw in am.     Problem: Diarrhea:  Goal: Establishment of normal bowel function will improve to within specified parameters  Establishment of normal bowel function will improve to within specified parameters   Outcome: Ongoing  No diarrhea noted at this time. Problem: Discharge Planning:  Goal: Discharged to appropriate level of care  Discharged to appropriate level of care   Outcome: Ongoing  Pt plans to be discharged back to ECF at time of discharge. Comments: Care plan reviewed with patient. Patient verbalize understanding of the plan of care and contribute to goal setting.

## 2017-12-18 NOTE — PROGRESS NOTES
Pharmacy Medication History Note      List of current medications patient is taking is complete. Source of information: medication list from [facility]    Changes made to medication list:  Medications removed (include reason, ex. therapy complete or physician discontinued):  · Budesonide-formoterol - alternate therapy  · Fluticasone - therapy complete  · Sertraline - therapy complete    Medications added/doses adjusted:  · Albuterol 2.5 mg/3 mL - inhale 1 mL (0.83 mg) by nebulization every 6 hours as needed for wheezing  · Fluticasone-vilanterol 100-25 mcg - inhale 1 puff by mouth once daily  · Megestrol 40 mg/mL - take 40 mg by mouth once dily  · Ondansetron 4 mg - take 1 tablet by mouth every 8 hours as needed for nausea  · Potassium chloride 20 mEq - take 1 tablet by mouth twice daily    Other notes (ex. Recent course of antibiotics, Coumadin dosing):  · Denies use of other OTC or herbal medications.       Allergies reviewed      Electronically signed by NAUN Maldonado [facility] on 12/18/2017 at 3:07 PM

## 2017-12-18 NOTE — PROGRESS NOTES
[address]  PHYSICAL THERAPY MISSED TREATMENT NOTE  ACUTE CARE    Date: 2017  Patient Name: Jose Floyd        MRN: [id number]   : 1940  (78 y.o.)  Gender: female                REASON FOR MISSED TREATMENT:  Pt continues to have femoral line. Will check tomorrow. Maria Elena Roth.  Jerome Guerrero, [facility] [place] [place] [facility]

## 2017-12-18 NOTE — PROGRESS NOTES
[address]  PHYSICAL THERAPY MISSED TREATMENT NOTE  ACUTE CARE    Date: 2017  Patient Name: Med Gamino        MRN: [id number]   : 1940  (78 y.o.)  Gender: female                REASON FOR MISSED TREATMENT:  Hold treatment per nursing request.  Pt currently has a femoral line restricting mobility. Will check back later as RN reports trying to get order to remove it. Anya Higgins.  Anayeli Espinoza, [place] [facility]

## 2017-12-18 NOTE — PLAN OF CARE
Problem: Falls - Risk of  Goal: Absence of falls  Outcome: Ongoing  Falling star program in place, magnet on door, slip resistant socks on when patient is out of bed, bed and chair alarms are on. Patient is using call light appropriately. Will continue hourly rounding and reassessing risk score. Problem: Tissue Perfusion - Renal, Altered:  Goal: Electrolytes within specified parameters  Electrolytes within specified parameters   Outcome: Ongoing  Lab Results   Component Value Date     12/18/2017    K 3.9 12/18/2017     12/18/2017    CO2 24 12/18/2017    BUN 9 12/18/2017    CREATININE 1.3 12/18/2017    GLUCOSE 75 12/18/2017    CALCIUM 6.4 12/18/2017      Patient electrolytes are managed by nephrology  Goal: Ability to achieve a balanced intake and output will improve  Ability to achieve a balanced intake and output will improve   Outcome: Ongoing  I/O last 3 completed shifts: In: 1979.4 [P.O.:635; I.V.:1344.4]  Out: 0   No intake/output data recorded. Monitoring intake and outputs every 8 hours    Patient has frequent bowel movements and is encouraged to increase oral fluid intake to avoid dehydration    Problem: Diarrhea:  Goal: Establishment of normal bowel function will improve to within specified parameters  Establishment of normal bowel function will improve to within specified parameters   Outcome: Ongoing  Patient is suffering from chronic diarrhea related to a surgical complication she experienced in a hospital in the [place] area. Problem: Discharge Planning:  Goal: Discharged to appropriate level of care  Discharged to appropriate level of care   Outcome: Ongoing  Patient plans to return to [facility] assisted living when medically stable. Problem: DISCHARGE BARRIERS  Goal: Patient's continuum of care needs are met  Outcome: Ongoing  Patient needs are evaluated and addressed as they are discovered. No new needs identified by patient.      Comments: Care plan reviewed with patient

## 2017-12-18 NOTE — CARE COORDINATION
12/18/17, 7:44 AM      Kathia Wilson       Admitted from: ER 12/15/2017/ [address] day: 2   Location: -02/002-A Reason for admit: Metabolic acidosis [W55.1] Status: IP  Admit order signed?: yes  PMH:  has a past medical history of Adenomatous polyp; HORTENCIA (acute kidney injury) ([facility] [address] CAD (coronary artery disease); Cancer ([facility] [address] DM (diabetes mellitus) ([facility] [address] Hypercholesteremia; Hypertension; Internal hemorrhoids; Lipoma; Obesity; Pneumonia; Use of cane as ambulatory aid; and Wears glasses. Medications:  Scheduled Meds:   ciprofloxacin  500 mg Oral 2 times per day    dextrose        aspirin  81 mg Oral Daily    vitamin D  50,000 Units Oral Weekly    ferrous sulfate  325 mg Oral TID WC    folic acid  1 mg Oral Daily    lactobacillus  1 capsule Oral TID    sertraline  100 mg Oral Daily    pravastatin  20 mg Oral QPM    megestrol  200 mg Oral Daily    phenytoin  100 mg Oral TID    enoxaparin  40 mg Subcutaneous Daily    pantoprazole  40 mg Oral QAM AC    mometasone-formoterol  2 puff Inhalation BID    Colchicine  0.6 mg Oral Daily    polycarbophil  625 mg Oral BID    rifaximin  550 mg Oral BID     Continuous Infusions:   sodium chloride 75 mL/hr at 12/18/17 0535      Pertinent Info/Orders/Treatment Plan:  Creat 1.3, Ca+ 6.4, Hgb 7.9, trop 0.036. CO2 9 on admission, improved at 24. Bicarb gtt stopped. IVF, po cipro. GI following for chronic diarrhea, recommendations made. Nephrology on board. Diet: DIET RENAL;   DVT Prophylaxis: Lovenox  Smoking status:  reports that she has never smoked.  She has never used smokeless tobacco.   Influenza Vaccination Screening Completed: yes  Pneumonia Vaccination Screening Completed: yes  Core measures: vte  PCP: Phillip Forbes MD  Readmission:   no  Risk Score: 18.75     Discharge Planning  Current Residence:  Nursing Home  Living Arrangements:  Other (Comment) (Nursing Home)   Support Systems:  Children, Friends/Neighbors  Current Services PTA:

## 2017-12-18 NOTE — PROGRESS NOTES
oxyCODONE-acetaminophen, loperamide, albuterol      Intake/Output Summary (Last 24 hours) at 12/18/17 1550  Last data filed at 12/18/17 1352   Gross per 24 hour   Intake          1979.43 ml   Output                0 ml   Net          1979.43 ml       Diet:  DIET RENAL;    Exam:  /62   Pulse 92   Temp 98.6 °F (37 °C) (Oral)   Resp 17   Ht 5' 7\" (1.702 m)   Wt 125 lb 12.8 oz (57.1 kg)   SpO2 98%   BMI 19.70 kg/m²     General appearance: No apparent distress, appears stated age and cooperative. HENT: Normocephalic and atraumatic. Jenness Shane Conjunctivae/corneas clear. Patient has bilateral cataracts. Neck: Supple, with full range of motion. Respiratory:  Normal respiratory effort. Clear to auscultation,   Cardiovascular: Regular rate and rhythm with normal S1/S2  Abdomen: Soft, non-tender, non-distended with normal bowel sounds. Musculoskeletal:  Full range of motion without deformity. Skin: Skin color, texture, turgor normal.  No rashes or lesions. Neurologic:  Cranial nerves: II-XII intact, grossly non-focal.  Psychiatric: Alert and oriented, thought content appropriate, normal insight  Capillary Refill: Brisk,< 3 seconds   Peripheral Pulses: +2 palpable, equal bilaterally       Labs:   Recent Labs      12/16/17   0820 12/17/17 0410 12/18/17   0547   WBC  5.6  5.4  6.1   HGB  9.2*  8.3*  7.9*   HCT  28.4*  24.8*  23.2*   PLT  258  255  244     Recent Labs      12/16/17   0820  12/17/17   0410  12/18/17   0547   NA  140  138  139   K  4.9  3.7  3.9   CL  114*  104  104   CO2  12*  21*  24   BUN  15  11  9   CREATININE  1.4*  1.3*  1.3*   CALCIUM  8.8  7.9*  6.4*   PHOS   --   2.5   --      Recent Labs      12/16/17   0820  12/17/17   0410  12/18/17   0547   AST  28  18  14   ALT  29  20  16   BILITOT  0.2*  0.2*  0.3   ALKPHOS  219*  186*  171*     No results for input(s): INR in the last 72 hours. No results for input(s): Rolo Colfax in the last 72 hours.     Urinalysis:      Lab Results alkaline phosphatase: Very likely related to vitamin D deficiency. It will be measured.     Monitor and treat all chronic conditions        Electronically signed by Corina Leon MD on 12/18/2017 at 3:50 PM

## 2017-12-18 NOTE — PLAN OF CARE
Problem: DISCHARGE BARRIERS  Goal: Patient's continuum of care needs are met  Outcome: Ongoing  Return to [facility], pre-cert required

## 2017-12-19 NOTE — PROGRESS NOTES
endurance, Decreased functional mobility , Decreased balance, Decreased strength  Assessment: Pt very weak in LEs, high fall risk, requires skilled PT to work on strengthening and balance to [employer] safety with transfers and gait  Prognosis: Good    Clinical Presentation: Moderate - Evolving with Changing Characteristics: Pt very weak in LEs, high fall risk, requires skilled PT to work on strengthening and balance to [employer] safety with transfers and gait    Decision Making: Moderate Complexitybased on patient assessment and decision making process of determining plan of care and establishing reasonable expectations for measurable functional outcomes    REQUIRES PT FOLLOW UP: Yes  Discharge Recommendations: Patient would benefit from continued therapy after discharge, Continue to assess pending progress    Patient Education:  Patient Education: POC    Equipment Recommendations:  Equipment Needed: No    Safety:  Type of devices:  All fall risk precautions in place, Call light within reach, Chair alarm in place, Gait belt, Nurse notified, Left in chair  Restraints  Initially in place: No    Plan:  Times per week: 3-5 X GM  Times per day: Daily  Specific instructions for Next Treatment: ther ex, mobility, gait, endurance, balance   Current Treatment Recommendations: Strengthening, Balance Training, Endurance Training, Functional Mobility Training, Transfer Training, Gait Training    Goals:  Patient goals : Go to [facility] for therapy  Short term goals  Time Frame for Short term goals: 2 weeks `  Short term goal 1: supine to sit and return with Mod i to get in and out o fbed   Short term goal 2: sit to stand with CGA to get on and off various surfaces  Short term goal 3: ambulate with walker 25 feet with CGA to walk to bathroom  Long term goals  Time Frame for Long term goals : NA due to short ELOS    Evaluation Complexity: Based on the findings of patient history, examination, clinical presentation, and decision making during

## 2017-12-19 NOTE — PLAN OF CARE
Problem: Falls - Risk of  Goal: Absence of falls  Outcome: Ongoing  Patients bed is kept in a low and locked position. Patient has stayed in bed through the night. Problem: Tissue Perfusion - Renal, Altered:  Goal: Electrolytes within specified parameters  Electrolytes within specified parameters   Outcome: Ongoing  Monitoring the patients labs. Creatinine was 1.3. Potassium is 3.9. Will continue to monitor. Problem: Diarrhea:  Goal: Establishment of normal bowel function will improve to within specified parameters  Establishment of normal bowel function will improve to within specified parameters   Outcome: Ongoing  Patient has been having loose stools. She has been having loose stools now for the past year. Could be due to part of colon not being there. Problem: Discharge Planning:  Goal: Discharged to appropriate level of care  Discharged to appropriate level of care   Outcome: Ongoing  Patient will return to [facility] assisted living after hospital stay. Problem: DISCHARGE BARRIERS  Goal: Patient's continuum of care needs are met  Outcome: Ongoing  Patients needs will be met at [place] assisted living. Comments: Care plan reviewed with patient. Patient verbalize understanding of the plan of care and contribute to goal setting.

## 2017-12-19 NOTE — PLAN OF CARE
Problem: Impaired respiratory status  Goal: Lung sounds clear or within normal limits for patient  Outcome: Ongoing  dulera ongoing to improve aeration.  Breath sounds are diminshed

## 2017-12-19 NOTE — PLAN OF CARE
Problem: Impaired respiratory status  Goal: Lung sounds clear or within normal limits for patient  Outcome: Ongoing  Pt had no questions on purpose or side effects of medication.   Will continue with maintenance medication as ordered

## 2017-12-19 NOTE — PROGRESS NOTES
[facility]    Renal inpatient Progress Note  12/19/2017 9:21 AM      Pt Name:   Niles Cole  YOB: 1940  Attending:   Jourdan Abreu MD    Chief Complaint:   Niles Mcmullenident is a 78 y.o. female being followed by nephrology for CKD and metabolic acidosis     Interval History : patient seen and examined by me. feels well. No cp or SOB. No diarrhea at this time. Itching is better     Scheduled Medications :   calcium elemental  500 mg Oral BID    megestrol  40 mg Oral Daily    ciprofloxacin  500 mg Oral 2 times per day    aspirin  81 mg Oral Daily    vitamin D  50,000 Units Oral Weekly    ferrous sulfate  325 mg Oral TID WC    folic acid  1 mg Oral Daily    lactobacillus  1 capsule Oral TID    pravastatin  20 mg Oral QPM    phenytoin  100 mg Oral TID    enoxaparin  40 mg Subcutaneous Daily    pantoprazole  40 mg Oral QAM AC    mometasone-formoterol  2 puff Inhalation BID    Colchicine  0.6 mg Oral Daily    polycarbophil  625 mg Oral BID    rifaximin  550 mg Oral BID           Vitals :  /62   Pulse 99   Temp 98.2 °F (36.8 °C) (Oral)   Resp 16   Ht 5' 7\" (1.702 m)   Wt 124 lb 8 oz (56.5 kg)   SpO2 95%   BMI 19.50 kg/m²     24HR INTAKE/OUTPUT:      Intake/Output Summary (Last 24 hours) at 12/19/17 0921  Last data filed at 12/19/17 0315   Gross per 24 hour   Intake              785 ml   Output                0 ml   Net              785 ml     Last 3 weights  Wt Readings from Last 3 Encounters:   12/19/17 124 lb 8 oz (56.5 kg)   10/11/17 141 lb (64 kg)   08/25/17 125 lb 9.6 oz (57 kg)        Physical Exam :  General Appearance:  Well developed.  No distress  Mouth/Throat:  Oral mucosa moist  Neck:  Supple, no JVD  Lungs:  Breath sounds: clear  Heart[de-identified]  S1,S2 heard  Abdomen:  Soft, non - tender  Musculoskeletal:  Edema - none     Last 3 CBC  Recent Labs      12/17/17   0410  12/18/17   0547  12/19/17   0620   WBC  5.4  6.1  6.6   RBC  2.77*  2.59*  2.66*

## 2017-12-19 NOTE — PROGRESS NOTES
difficulty advancing L LE                Activity Tolerance:  Activity Tolerance: Patient Tolerated treatment well, Patient limited by fatigue  Activity Tolerance: Pt just finished with PT 1 hour prior to OT session, requesting light session. Assessment:  Assessment: Pt exhibiting above deficits requiring additional OT intervention to increase indep with self care. Performance deficits / Impairments: Decreased functional mobility , Decreased ADL status, Decreased endurance, Decreased balance, Decreased strength  Prognosis: Good  Discharge Recommendations: Subacute/Skilled Nursing Facility    Clinical Decision Making: Clinical Decision making was of Moderate Complexity as the result of analysis of data from a detailed assessment, a consideration of several treatment options, the presence of comorbidities affecting the plan of care and the need for minimal to moderate modifications or assistance required to complete the evaluation. Patient Education:  Patient Education: OT POC, safety with mobility/transfers    Equipment Recommendations:  Equipment Needed: No    Safety:  Safety Devices in place: Yes  Type of devices: All fall risk precautions in place, Gait belt, Patient at risk for falls, Call light within reach, Left in chair    Plan:  Times per week: 5x  Current Treatment Recommendations: Strengthening, Balance Training, Functional Mobility Training, Safety Education & Training, Patient/Caregiver Education & Training, Self-Care / ADL    Goals:       Short term goals  Time Frame for Short term goals: 1 week  Short term goal 1: Pt will complete functional mobility within environment with min cues safety and Rosalina to increase indep with self care. Short term goal 2: Pt will complete dynamic standing task x 5 min with B hand release to increase indep with grooming. Short term goal 3: Pt will complete ADL routine with CGA to increase indep with self care.   Long term goals  Time Frame for Long term goals : No LTG d/t short ELOS. Evaluation Complexity: Based on the findings of patient history, examination, clinical presentation, and decision making during this evaluation, this patient is of medium complexity.

## 2017-12-20 NOTE — PROGRESS NOTES
12/19/17   0620   WBC  6.1  6.6   RBC  2.59*  2.66*   HGB  7.9*  7.8*   HCT  23.2*  23.8*   PLT  244  253     Last 3 CMP  Recent Labs      12/18/17   0547  12/19/17   0620   NA  139  140   K  3.9  3.3*   CL  104  106   CO2  24  21*   BUN  9  8   CREATININE  1.3*  1.3*   CALCIUM  6.4*  8.2*   LABALBU  2.3*  2.1*   BILITOT  0.3  0.2*        Assessment / Plan   Renal - HORTENCIA / CKD III overall stable  - renal Fx at baseline. No new BMP available. Electrolytes - Hypokalemia, S/P 40 kcl yesterday . Follow k level today  Essential Hypertension - no meds, Running slightly borderline  Metabolic acidosis - 2/2 diarrhea - Currently down to 21 again I will start her on sodium bicarbonate tablets  Itching - Continue hydroxyzine  Hypocalcemia - continue ergo for now  D/W patient, nurse. meds reviewed    If discharged follow in clinic in 4 weeks with a BMP prior    PABLO Lassiter D.  [facility].

## 2017-12-20 NOTE — PROGRESS NOTES
pantoprazole  40 mg Oral QAM AC    mometasone-formoterol  2 puff Inhalation BID    Colchicine  0.6 mg Oral Daily    polycarbophil  625 mg Oral BID    rifaximin  550 mg Oral BID     PRN Meds: hydrOXYzine, ondansetron, oxyCODONE-acetaminophen, loperamide, albuterol      Intake/Output Summary (Last 24 hours) at 12/20/17 1308  Last data filed at 12/20/17 0831   Gross per 24 hour   Intake              550 ml   Output                0 ml   Net              550 ml       Diet:  DIET RENAL;    Exam:  /74   Pulse 112   Temp 97.9 °F (36.6 °C) (Oral)   Resp 18   Ht 5' 7\" (1.702 m)   Wt 126 lb 3.2 oz (57.2 kg)   SpO2 96%   BMI 19.77 kg/m²     General appearance: No apparent distress, appears stated age and cooperative. HENT: Normocephalic and atraumatic. Vernida Chard Conjunctivae/corneas clear. Patient has bilateral cataracts. Neck: Supple, with full range of motion. Respiratory:  Normal respiratory effort. Clear to auscultation,   Cardiovascular: Regular rate and rhythm with normal S1/S2  Abdomen: Soft, non-tender, non-distended with normal bowel sounds. Musculoskeletal:  Full range of motion without deformity. Skin: Skin color, texture, turgor normal.  No rashes or lesions.   Neurologic:  Cranial nerves: II-XII intact, grossly non-focal.  Psychiatric: Alert and oriented, thought content appropriate, normal insight  Capillary Refill: Brisk,< 3 seconds   Peripheral Pulses: +2 palpable, equal bilaterally       Labs:   Recent Labs      12/18/17   0547  12/19/17   0620   WBC  6.1  6.6   HGB  7.9*  7.8*   HCT  23.2*  23.8*   PLT  244  253     Recent Labs      12/18/17   0547  12/19/17   0620  12/20/17   0829   NA  139  140  141   K  3.9  3.3*  3.9   CL  104  106  111   CO2  24  21*  18*   BUN  9  8  11   CREATININE  1.3*  1.3*  1.5*   CALCIUM  6.4*  8.2*  8.8     Recent Labs      12/18/17   0547  12/19/17   0620  12/20/17   0829   AST  14  18  17   ALT  16  14  14   BILITOT  0.3  0.2*  0.3   ALKPHOS  171*  170*  197*

## 2017-12-20 NOTE — CARE COORDINATION
12/20/17, 8:13 AM      Nadia Son day: 4  Location: [facility]02/002 Reason for admit: Metabolic acidosis [U04.2]   Treatment Plan of Care: Working with PT/OT. Awaiting morning labs. Bicarb gtt stopped. Nephrology following. Core Measures: vte  PCP: Rhonad Eckert MD  Discharge Plan: Return to [facility]. Precert required. * Update:  Discharge today if precert returned.

## 2017-12-20 NOTE — DISCHARGE SUMMARY
Hospital Medicine Discharge Summary      Patient Identification:   Pasha Carter   : 1940  MRN: [id number]   Account: [de-identified]      Patient's PCP: Merissa Rios MD    Admit Date: 12/15/2017     Discharge Date:   17    Admitting Physician: Ana Diana MD     Discharge Physician: Corina Leon MD     Discharge Diagnoses: Active Hospital Problems    Diagnosis Date Noted    Metabolic acidosis [I74.9] 2017     Priority: High     Class: Acute    Diarrhea [R19.7] 2017     Priority: High     Class: Acute    Hyperchloremic acidosis [E87.2] 2017     Priority: High     Class: Chronic    Fecal incontinence [R15.9] 2017     Priority: High     Class: Chronic    Adenomatous polyps [D36.9] 2017     Priority: High     Class: Acute    Bile salt-induced diarrhea  bile salt sequestration dfollowinh hemicolectomy [K90.89] 2017     Priority: High     Class: Acute    Chronic kidney disease [N18.9]     Physical deconditioning [R53.81]     Elevated alkaline phosphatase level [R74.8]     Hyperkalemia [E87.5]     Volume depletion, gastrointestinal loss [E86.9]        The patient was seen and examined on day of discharge and this discharge summary is in conjunction with any daily progress note from day of discharge. Hospital Course:   Pasha Carter is a 78 y.o. female admitted to [facility] on 12/15/2017 for hyperkalemia. Pt was stabilized, admitted. Potassium now normal. She was also found to have uti. discharged on Cipro. Pt had HORTENCIA and Nephrology followed. Cr has stabilized  Pt has diarrhea but this is chronic   Pt has acidosis and is being discharged on bicarb.   Chronic conditions were monitored and treated  See progress note        Exam:     Vitals:  Vitals:    17 0419 17 0445 17 0831 17 1137   BP: 97/61  (!) 94/58 125/74   Pulse: 107  104 112   Resp:    Temp: 98.4 °F (36.9 °C)  97.9 °F (36.6 °C) TempSrc: Oral  Oral    SpO2: 100%  95% 96%   Weight: 126 lb (57.2 kg) 126 lb 3.2 oz (57.2 kg)     Height:         Weight: Weight: 126 lb 3.2 oz (57.2 kg)     24 hour intake/output:  Intake/Output Summary (Last 24 hours) at 12/20/17 1309  Last data filed at 12/20/17 0831   Gross per 24 hour   Intake              550 ml   Output                0 ml   Net              550 ml            Labs: For convenience and continuity at follow-up the following most recent labs are provided:      CBC:    Lab Results   Component Value Date    WBC 6.6 12/19/2017    HGB 7.8 12/19/2017    HCT 23.8 12/19/2017     12/19/2017       Renal:  Lab Results   Component Value Date     12/20/2017    K 3.9 12/20/2017     12/20/2017    CO2 18 12/20/2017    BUN 11 12/20/2017    CREATININE 1.5 12/20/2017    CALCIUM 8.8 12/20/2017    PHOS 2.5 12/17/2017         Significant Diagnostic Studies    Radiology:   No orders to display          Consults:     IP CONSULT TO GI  IP CONSULT TO NEPHROLOGY  IP CONSULT TO SOCIAL WORK    Disposition:    [] Home       [] TCU       [] Rehab       [] Psych       [] SNF       [] [place]       [] Other-    Condition at Discharge: Stable    Code Status:  Full Code     Patient Instructions:    Discharge lab work: Activity: activity as tolerated  Diet: DIET RENAL;      Follow-up visits:   Jamie Cabrera MD  [address] [facility]  [phone]          [address] [place] [address] [address]  [address] MD  [address] [place] [address]  [phone]      Have ECF physician see within 5 days    Anam Castillo MD  [address]  [phone]    In 4 weeks  with a BMP prior    Tasneem Vilchis MD  [address]  [address] [address]  [phone]               Discharge Medications:      J oAnn Oropeza   Home Medication Instructions OAO:[id number]    Printed on:12/20/17 1303   Medication Information                      albuterol (PROVENTIL) (2.5 MG/3ML) 0.083% nebulizer solution  Take 0.83 mg by nebulization every 6 hours as needed for Wheezing              aspirin 81 MG tablet  Take 1 tablet by mouth daily             ciprofloxacin (CIPRO) 500 MG tablet  Take 1 tablet by mouth every 12 hours for 3 days             colchicine (COLCRYS) 0.6 MG tablet  Take 0.6 mg by mouth daily             ergocalciferol (DRISDOL) 67951 units capsule  Take 1 capsule by mouth once a week             ferrous sulfate 325 (65 Fe) MG tablet  Take 1 tablet by mouth 3 times daily (with meals)             fluticasone-vilanterol (BREO ELLIPTA) 100-25 MCG/INH AEPB inhaler  Inhale 1 puff into the lungs daily             folic acid (FOLVITE) 1 MG tablet  Take 1 tablet by mouth daily             Lactobacillus (PROBIOTIC ACIDOPHILUS) TABS  Take 1 tablet by mouth 3 times daily             loperamide (LOPERAMIDE A-D) 2 MG tablet  Take 2 mg by mouth every 2 hours as needed for Diarrhea             megestrol (MEGACE ORAL) 40 MG/ML suspension  Take 40 mg by mouth daily              omeprazole (PRILOSEC) 40 MG delayed release capsule  Take 1 capsule by mouth daily             ondansetron (ZOFRAN) 4 MG tablet  Take 4 mg by mouth every 8 hours as needed for Nausea             oxyCODONE-acetaminophen (PERCOCET) 5-325 MG per tablet  Take 1 tablet by mouth every 8 hours as needed for Pain .             phenytoin (DILANTIN) 100 MG ER capsule  Take 100 mg by mouth 3 times daily             potassium chloride (KLOR-CON M) 20 MEQ extended release tablet  Take 1 tablet by mouth daily (with breakfast)             pravastatin (PRAVACHOL) 20 MG tablet  Take 1 tablet by mouth every evening             rifaximin (XIFAXAN) 550 MG tablet  Take 1 tablet by mouth 2 times daily             sodium bicarbonate 650 MG tablet  Take 1 tablet by mouth 2 times daily                 Time Spent on discharge is more than 45 minutes in the examination, evaluation, counseling and review of medications and discharge plan. Signed: Thank you Rianna Galvan MD for the opportunity to be involved in this patient's care.     Electronically signed by Oral Heimlich, MD on 12/20/2017 at 1:09 PM

## 2017-12-20 NOTE — PROGRESS NOTES
Called EMILIA to see where pt's ambulette is as it was suppose to be here at 1630. EMIL[facility] stated that it is going to be another 15-20min before an ambulette becomes available.

## 2017-12-24 PROBLEM — N39.0 SEPSIS SECONDARY TO UTI (HCC): Status: ACTIVE | Noted: 2017-01-01

## 2017-12-24 PROBLEM — A41.9 SEPSIS SECONDARY TO UTI (HCC): Status: ACTIVE | Noted: 2017-01-01

## 2017-12-24 NOTE — H&P
History & Physical        Patient:  Zenaida Irwin  YOB: 1940    MRN: [id number]     Acct: [de-identified]    PCP: Kelley Berrios MD    Date of Admission: 12/24/2017    Date of Service: Pt seen/examined on 12/24/2017 and Admitted to Inpatient with expected LOS greater than two midnights due to medical therapy. Chief Complaint:  Fall    History Of Present Illness:   78 y.o. female who presented to [facility] with a fall that happened last night. Pt was in bed trying to reach out for something but rolled of. Pt lives [address] and was feeling weak. Pt was bought into the ED for evaluation. Scans were negative, but in ED noted to be septic with a UTI    Past Medical History:          Diagnosis Date    Adenomatous polyp 08/31/2016    HORTENCIA (acute kidney injury) ([address] 12/6/2016    CAD (coronary artery disease)     Cancer ([address]     DM (diabetes mellitus) ([address]     On Metformin    Hypercholesteremia     Hypertension     ON Amlodipine, Cozaar, Hydrochlorthiazide    Internal hemorrhoids     Lipoma 08/31/2016    Obesity     Pneumonia 03/17/2015    [facility]    Use of cane as ambulatory aid     as needed    Wears glasses      Past Surgical History:          Procedure Laterality Date    ABDOMINAL ADHESION SURGERY  10/20/2016    robotic assisted laparascopic    CHOLECYSTECTOMY      COLONOSCOPY  08/31/2016    adenomatous polyp; IN THE DISTAL ASCENDING COLON AREA WITH LITTLE LOOPING OF THE SCOPE THERE A A LARGE ( ABOUT 6-7 CM ) FLAT POLYPOID LESION NOTED OVER A FOLD IT WAS VILLOUS APPEARING AND IS NOT AMENABLE TO BE REMOVED COMPLETELY BY ENDOSCOPY ALONE.  MULTIPLE BIOPSIES WERE TAKEN AND PICTURES WERE TAKEN.: HEPATIC FLEXURE A LARGE LIPOMA WAS ALSO NOTED    HEMICOLECTOMY  10/20/2016    open    HYSTERECTOMY      NC ESOPHAGOGASTRODUODENOSCOPY TRANSORAL DIAGNOSTIC N/A 7/28/2017    EGD ESOPHAGOGASTRODUODENOSCOPY performed by Brooke Padilla MD at  [facility] [address] TUMOR REMOVAL      Lt. face close to ear       Medications Prior to Admission:      Prior to Admission medications    Medication Sig Start Date End Date Taking? Authorizing Provider   oxyCODONE-acetaminophen (PERCOCET) 5-325 MG per tablet Take 1 tablet by mouth every 8 hours as needed for Pain .  12/20/17   Howard Moran MD   rifaximin Laverta Rogue) 550 MG tablet Take 1 tablet by mouth 2 times daily 12/20/17 1/19/18  Howard Moran MD   sodium bicarbonate 650 MG tablet Take 1 tablet by mouth 2 times daily 12/20/17   Howard Moran MD   potassium chloride (KLOR-CON M) 20 MEQ extended release tablet Take 1 tablet by mouth daily (with breakfast) 12/20/17   Howard Moran MD   fluticasone-vilanterol (BREO ELLIPTA) 100-25 MCG/INH AEPB inhaler Inhale 1 puff into the lungs daily    Historical Provider, MD   ondansetron (ZOFRAN) 4 MG tablet Take 4 mg by mouth every 8 hours as needed for Nausea    Historical Provider, MD   megestrol (MEGACE ORAL) 40 MG/ML suspension Take 40 mg by mouth daily     Historical Provider, MD   phenytoin (DILANTIN) 100 MG ER capsule Take 100 mg by mouth 3 times daily    Historical Provider, MD   loperamide (LOPERAMIDE A-D) 2 MG tablet Take 2 mg by mouth every 2 hours as needed for Diarrhea    Historical Provider, MD   albuterol (PROVENTIL) (2.5 MG/3ML) 0.083% nebulizer solution Take 0.83 mg by nebulization every 6 hours as needed for Wheezing     Historical Provider, MD   colchicine (COLCRYS) 0.6 MG tablet Take 0.6 mg by mouth daily    Historical Provider, MD   aspirin 81 MG tablet Take 1 tablet by mouth daily 10/11/17   Jacobo Mar MD   ergocalciferol (DRISDOL) 06398 units capsule Take 1 capsule by mouth once a week 10/11/17   Jacobo Mar MD   ferrous sulfate 325 (65 Fe) MG tablet Take 1 tablet by mouth 3 times daily (with meals) 10/11/17   Jacobo Mar MD   folic acid (FOLVITE) 1 MG tablet Take 1 tablet by mouth daily 10/11/17   Jacobo Mar MD   Lactobacillus (PROBIOTIC ACIDOPHILUS) TABS Take 1 tablet by mouth 3 times daily 10/11/17   May Cox MD   omeprazole (PRILOSEC) 40 MG delayed release capsule Take 1 capsule by mouth daily 10/11/17   May Cox MD   pravastatin (PRAVACHOL) 20 MG tablet Take 1 tablet by mouth every evening 10/11/17   May Cox MD       Allergies:  Review of patient's allergies indicates no known allergies. Social History:      The patient currently lives [address] [facility] home    TOBACCO:   reports that she has never smoked. She has never used smokeless tobacco.  ETOH:   reports that she does not drink alcohol. Family History:    Reviewed in detail and negative for DM, CAD, Cancer, CVA. Positive as follows:        Problem Relation Age of Onset    Ovarian Cancer Mother     No Known Problems Father     Heart Disease Sister     Heart Surgery Sister     Heart Surgery Brother     No Known Problems Maternal Grandmother     No Known Problems Maternal Grandfather     No Known Problems Paternal Grandmother     No Known Problems Paternal Grandfather     No Known Problems Sister        Diet:       REVIEW OF SYSTEMS:   Pertinent positives as noted in the HPI. All other systems reviewed and negative. PHYSICAL EXAM:    BP 90/71   Pulse 108   Temp 98.8 °F (37.1 °C) (Oral)   Resp 17   Wt 125 lb (56.7 kg)   SpO2 100%   BMI 19.58 kg/m²     General appearance:  No apparent distress, appears stated age and cooperative. HEENT:  Normal cephalic, atraumatic without obvious deformity. Pupils equal, round. Extra ocular muscles intact. Conjunctivae/corneas clear. Neck: Supple, with full range of motion. No jugular venous distention. Trachea midline. Respiratory:  Normal respiratory effort. Clear to auscultation, bilaterally without Rales/Wheezes/Rhonchi. Cardiovascular:  S1S2 (+), tachycardic   Abdomen: Soft, non-tender, non-distended with normal bowel sounds.   Musculoskeletal:  No clubbing, cyanosis or edema bilaterally. Full range of motion without deformity. Skin: Skin color, texture, turgor normal.  No rashes or lesions. Neurologic:  Neurovascularly intact without any focal sensory/motor deficits. Cranial nerves: II-XII intact, grossly non-focal.  Psychiatric:  Alert and oriented, thought content appropriate, normal insight  Peripheral Pulses: +2 palpable, equal bilaterally       Labs:     Recent Labs      12/24/17   0650   WBC  16.0*   HGB  7.7*   HCT  23.8*   PLT  202     Recent Labs      12/24/17   0650   NA  141   K  5.5*   CL  109   CO2  15*   BUN  35*   CREATININE  3.8*   CALCIUM  8.5     Recent Labs      12/24/17   0650   AST  14   ALT  14   BILITOT  0.3   ALKPHOS  169*     Recent Labs      12/24/17   0650   INR  1.45*     Recent Labs      12/24/17   0650   CKTOTAL  69       Urinalysis:      Lab Results   Component Value Date    NITRU NEGATIVE 12/24/2017    WBCUA  12/24/2017    BACTERIA NONE 12/24/2017    RBCUA 0-2 12/24/2017    BLOODU SMALL 12/24/2017    SPECGRAV 1.013 07/21/2017    GLUCOSEU NEGATIVE 12/24/2017    GLUCOSEU NEGATIVE 03/01/2012       Radiology:   XR Chest Portable   Final Result   1. Mild platelike atelectasis and overall mild volume loss in the right lower lobe with mild elevation of the right diaphragm. **This report has been created using voice recognition software. It may contain minor errors which are inherent in voice recognition technology. **      Final report electronically signed by Dr. Veda Fatima on 12/24/2017 4:29 AM           DVT prophylaxis: [] Lovenox                                 [x] SCDs                                 [] SQ Heparin                                 [] Encourage ambulation           [] Already on Anticoagulation    Code Status: Prior      PT/OT Eval Status: ordered    Disposition:    [] Home       [] TCU       [] Rehab       [] Psych       [] SNF       [] [place]       [] Other-    ASSESSMENT/PLAN:  1) UTI with Sepsis

## 2017-12-24 NOTE — ED PROVIDER NOTES
[facility]  eMERGENCY dEPARTMENT eNCOUnter          CHIEF COMPLAINT       Chief Complaint   Patient presents with    Fall       Nurses Notes reviewed and I agree except as noted in the HPI. HISTORY OF PRESENT ILLNESS    Kim Louis is a 78 y.o. female who presents following a fall at the nursing home. Patient states that she was lying in bed watching TV when she reached over to grab her gown, and fell off of her bed. Patient states she remembers falling, and did not lose consciousness, but did hit her head. She denies having experienced any neck pain, or any other generalized pain due to the fall. Patient had a blood pressure of 200 just prior to arrival at the ED. Patient reports a rash all over her body that itches. Other than this, she has no other physical complaints at this time. REVIEW OF SYSTEMS     Review of Systems   Constitutional: Negative for activity change, appetite change, chills, fatigue and fever. HENT: Negative for congestion, ear discharge, hearing loss, nosebleeds and rhinorrhea. Eyes: Negative for discharge and itching. Respiratory: Negative for cough, shortness of breath and wheezing. Cardiovascular: Negative for chest pain. Gastrointestinal: Negative for abdominal distention, abdominal pain, diarrhea, nausea and vomiting. Endocrine: Negative for cold intolerance. Genitourinary: Negative for dysuria and flank pain. Musculoskeletal: Negative for arthralgias, myalgias, neck pain and neck stiffness. Skin: Positive for rash. Negative for wound. Neurological: Negative for dizziness and weakness. Psychiatric/Behavioral: Negative for agitation and suicidal ideas. PAST MEDICAL HISTORY    has a past medical history of Adenomatous polyp; HORTENCIA (acute kidney injury) ([facility] CAD (coronary artery disease); Cancer ([facility] [facility] DM (diabetes mellitus) ([facility] Hypercholesteremia; Hypertension; Internal hemorrhoids; Lipoma;  Obesity; Pneumonia; Use of cane as ambulatory aid; and Wears glasses. SURGICAL HISTORY      has a past surgical history that includes Hysterectomy; Colonoscopy (08/31/2016); Cholecystectomy; tumor removal; hemicolectomy (10/20/2016); Abdominal adhesion surgery (10/20/2016); and esophagogastroduodenoscopy transoral diagnostic (N/A, 7/28/2017). CURRENT MEDICATIONS       Previous Medications    ALBUTEROL (PROVENTIL) (2.5 MG/3ML) 0.083% NEBULIZER SOLUTION    Take 0.83 mg by nebulization every 6 hours as needed for Wheezing     ASPIRIN 81 MG TABLET    Take 1 tablet by mouth daily    COLCHICINE (COLCRYS) 0.6 MG TABLET    Take 0.6 mg by mouth daily    ERGOCALCIFEROL (DRISDOL) 66229 UNITS CAPSULE    Take 1 capsule by mouth once a week    FERROUS SULFATE 325 (65 FE) MG TABLET    Take 1 tablet by mouth 3 times daily (with meals)    FLUTICASONE-VILANTEROL (BREO ELLIPTA) 100-25 MCG/INH AEPB INHALER    Inhale 1 puff into the lungs daily    FOLIC ACID (FOLVITE) 1 MG TABLET    Take 1 tablet by mouth daily    LACTOBACILLUS (PROBIOTIC ACIDOPHILUS) TABS    Take 1 tablet by mouth 3 times daily    LOPERAMIDE (LOPERAMIDE A-D) 2 MG TABLET    Take 2 mg by mouth every 2 hours as needed for Diarrhea    MEGESTROL (MEGACE ORAL) 40 MG/ML SUSPENSION    Take 40 mg by mouth daily     OMEPRAZOLE (PRILOSEC) 40 MG DELAYED RELEASE CAPSULE    Take 1 capsule by mouth daily    ONDANSETRON (ZOFRAN) 4 MG TABLET    Take 4 mg by mouth every 8 hours as needed for Nausea    OXYCODONE-ACETAMINOPHEN (PERCOCET) 5-325 MG PER TABLET    Take 1 tablet by mouth every 8 hours as needed for Pain .     PHENYTOIN (DILANTIN) 100 MG ER CAPSULE    Take 100 mg by mouth 3 times daily    POTASSIUM CHLORIDE (KLOR-CON M) 20 MEQ EXTENDED RELEASE TABLET    Take 1 tablet by mouth daily (with breakfast)    PRAVASTATIN (PRAVACHOL) 20 MG TABLET    Take 1 tablet by mouth every evening    RIFAXIMIN (XIFAXAN) 550 MG TABLET    Take 1 tablet by mouth 2 times daily    SODIUM BICARBONATE 650 MG TABLET    Take 1 tablet by mouth 2 times daily       ALLERGIES     has No Known Allergies. FAMILY HISTORY     indicated that her mother is . She indicated that the status of her father is unknown. She indicated that both of her sisters are alive. She indicated that her brother is alive. She indicated that her maternal grandmother is . She indicated that her maternal grandfather is . She indicated that her paternal grandmother is . She indicated that her paternal grandfather is . family history includes Heart Disease in her sister; Heart Surgery in her brother and sister; No Known Problems in her father, maternal grandfather, maternal grandmother, paternal grandfather, paternal grandmother, and sister; Ovarian Cancer in her mother. SOCIAL HISTORY      reports that she has never smoked. She has never used smokeless tobacco. She reports that she does not drink alcohol or use drugs. PHYSICAL EXAM     INITIAL VITALS:  weight is 125 lb (56.7 kg). Her oral temperature is 98.8 °F (37.1 °C). Her blood pressure is 90/71 and her pulse is 108. Her respiration is 17 and oxygen saturation is 100%. Physical Exam   Constitutional: She is oriented to person, place, and time. She appears well-developed and well-nourished. HENT:   Head: Normocephalic and atraumatic. Eyes: Pupils are equal, round, and reactive to light. Right eye exhibits no discharge. Left eye exhibits no discharge. No scleral icterus. Neck: Normal range of motion. Neck supple. No tracheal deviation present. Cardiovascular: Regular rhythm and normal heart sounds. Exam reveals no gallop and no friction rub. No murmur heard. She is tachycardic   Pulmonary/Chest: Effort normal and breath sounds normal. No stridor. No respiratory distress. She has no wheezes. Abdominal: Soft. There is no tenderness. There is no rebound and no guarding. Musculoskeletal: She exhibits no edema or tenderness.         Right upper arm: She exhibits

## 2017-12-24 NOTE — ED TRIAGE NOTES
Pt comes to the ED with c/o falling out of bed. Pt states that she was reaching for her gown and she rolled out of bed. Pt is a resident @ [facility]. Nursing staff stated that her BP systolic was in the 025O when they took it. They thought that this pressure was too high and pt needed to be evaluated. EKG complete. Pt alert and oriented x3. Pt oriented to room and educated on the call light. Bed in lowest position and side rails up x2.

## 2017-12-24 NOTE — ED NOTES
Pt admitted to hospital. Transported to floor by cart in stable condition, notified floor of transport. Pt on monitor.         Erika Lee, RONNYN  47/72/35 [id number]

## 2017-12-24 NOTE — PROGRESS NOTES
Renal Adjustment Per Protocol       Darshana Sweeney is a 78 y.o. female. Pharmacy has been ordered to renally adjust Lovenox per P&T Committee. Recent Labs      12/24/17   0650   BUN  35*       Recent Labs      12/24/17   0650   CREATININE  3.8*       Estimated Creatinine Clearance: 12 mL/min (based on SCr of 3.8 mg/dL).     Height:   Ht Readings from Last 1 Encounters:   12/24/17 5' 7\" (1.702 m)     Weight:  Wt Readings from Last 1 Encounters:   12/24/17 135 lb 4.8 oz (61.4 kg)       SCr:   3.8    Plan: Adjust the following medications based on renal function:           Lovenox 40 mg Daily was changed to Lovenox 30 mg Daily for CrCl of 12

## 2017-12-24 NOTE — CONSULTS
heart sounds. No murmur heard. Pulmonary/Chest: Effort normal and breath sounds normal. No stridor. No respiratory distress. She exhibits no tenderness. Abdominal: Soft. Bowel sounds are normal. There is no tenderness. Musculoskeletal: She exhibits no edema or tenderness. Neurological: She is alert and oriented to person, place, and time. Skin: Skin is warm and dry. No rash noted. She is not diaphoretic. No erythema. Psychiatric: Mood, memory and affect normal.   Vitals reviewed. Vitals:    12/24/17 1145   BP:    Pulse:    Resp:    Temp: 97.4 °F (36.3 °C)   SpO2:      Labs, Radiology and Tests       Recent Labs      12/24/17   0650   WBC  16.0*   RBC  2.65*   HGB  7.7*   HCT  23.8*   MCV  89.8   MCH  29.2   MCHC  32.5*   RDW  15.7*   PLT  202     Recent Labs      12/24/17   0650   NA  141   K  5.5*   CL  109   CO2  15*   BUN  35*   CREATININE  3.8*   CALCIUM  8.5   PROT  4.1*   LABALBU  2.0*   BILITOT  0.3   ALKPHOS  169*   AST  14   ALT  14       Radiology :Chest x-ray-personally reviewed by me appears to be clear no effusion consolidation. Pulmonary vasculature appears to be within normal limits. Other : Old laboratory data have been reviewed and noted that patient's baseline creatinine normally runs between 1.3-1.5. Assessment    Renal - Acute kidney injury on chronic kidney disease, This is most likely secondary to prerenal etiology from poor oral intake combined with chronic diarrhea. - Currently agree with IV hydration at 150 ML per hour. Can decrease it down to 100 ML per hour at 5 PM.  - Send urine electrolytes. Hyperkalemia - Secondary to acute kidney injury  And exogenous kcl supplements . the hydration will help no need for any Kayexalate at this time. Metabolic acidosis - Due to chronic diarrhea on oral bicarbonate. Bicarbonate is 15 . ABG shows a pH of 7.39 and most likely we will need to start her on IV bicarbonate in the morning.     Hypotension again probably from volume depletion/sepsis continue IV fluids currently appears to be getting better. Anemia - Chronic, Iron studies okay in July we will repeat iron studies. Urinary tract infection - On antibiotics and await cultures and sensitivities. Hypoalbuminemia    meds reviewed and D/W patient and nurse    **This report has been created using voice recognition software. It may contain minor  errors which are inherent in voice recognition technology. **    Tara Encarnacion M.D  [facility].

## 2017-12-24 NOTE — ED NOTES
Pt refused blood draw at this time. Dr. Debora Plascencia at bedside to discuss CVC. Pt is refusing at this time.      Kale Hodges RN  12/24/17 [id number]

## 2017-12-25 PROBLEM — R65.20 SEVERE SEPSIS (HCC): Status: ACTIVE | Noted: 2017-01-01

## 2017-12-25 NOTE — PROGRESS NOTES
Received call from Danilo Villar NP regarding positive blood cultures. Appears to be VRE culture. Pt to be placed in contact isolation and Rocephin abx changed to Zyvox IVPB.

## 2017-12-25 NOTE — PLAN OF CARE
Problem: Nutritional:  Goal: Maintenance of adequate nutrition will improve  Maintenance of adequate nutrition will improve     Intervention: Encourage appropriate dietary choices  Patient has decrease appetite. Did not eat much during the day. Tolerating water. Denies nausea or vomiting. Problem: Physical Regulation:  Goal: Signs and symptoms of infection will decrease  Signs and symptoms of infection will decrease   Outcome: Ongoing  Patient weak. Turns in bed without assistance. Unable to sit up in bed without assistance. Goal: Complications related to the disease process, condition or treatment will be avoided or minimized  Complications related to the disease process, condition or treatment will be avoided or minimized   Outcome: Ongoing  Patient complains of dysuria with voiding. Incontinent with unmeasurable voids. Urine culture pending. Receiving Rocephin for antibiotic therapy. Urine was sent for random electrolytes per order. Problem: Discharge Planning:  Goal: Discharged to appropriate level of care  Discharged to appropriate level of care   Outcome: Ongoing  Patient admitted from [facility]. Plans to return there. Social work assistance with discharge needs. Discharge planning remains in progress. Problem: Falls - Risk of:  Goal: Will remain free from falls  Will remain free from falls   Outcome: Ongoing  Bed alarm on zone 2. Patient remains in bed throughout shift without trying to get out on own. Remains free from falls. Problem: Infection - Central Venous Catheter-Associated Bloodstream Infection:  Goal: Will show no infection signs and symptoms  Will show no infection signs and symptoms   Outcome: Ongoing  Right femoral venous line in place. Patient having multiple bouts of diarrhea. Stool on dressing. Dressing removed and new dressing applied using sterile technique and while wearing mask. Discussed with patient that line needs to be kept clean.   Triple lumens draw and flush with ease. Afebrile. Problem: Urinary Elimination:  Goal: Signs and symptoms of infection will decrease  Signs and symptoms of infection will decrease   Outcome: Ongoing  Patient weak. Turns in bed without assistance. Unable to sit up in bed without assistance. Goal: Complications related to the disease process, condition or treatment will be avoided or minimized  Complications related to the disease process, condition or treatment will be avoided or minimized   Outcome: Ongoing  Patient complains of dysuria with voiding. Incontinent with unmeasurable voids. Urine culture pending. Receiving Rocephin for antibiotic therapy. Urine was sent for random electrolytes per order. Comments: Care plan reviewed with patient. Patient verbalizes understanding of the plan of care and contribute to goal setting.

## 2017-12-25 NOTE — PROGRESS NOTES
4.5   CL  109  112*   CO2  15*  14*   BUN  35*  32*   CREATININE  3.8*  3.5*   CALCIUM  8.5  7.6*   PHOS   --   2.7     Recent Labs      12/24/17   0650   AST  14   ALT  14   BILITOT  0.3   ALKPHOS  169*     Recent Labs      12/24/17   0650  12/25/17   0318   INR  1.45*  1.45*     Recent Labs      12/24/17   0650   CKTOTAL  69       Urinalysis:    Lab Results   Component Value Date    NITRU NEGATIVE 12/24/2017    WBCUA  12/24/2017    BACTERIA NONE 12/24/2017    RBCUA 0-2 12/24/2017    BLOODU SMALL 12/24/2017    SPECGRAV 1.013 07/21/2017    GLUCOSEU NEGATIVE 12/24/2017    GLUCOSEU NEGATIVE 03/01/2012       Radiology:  XR Chest Portable   Final Result   1. Mild platelike atelectasis and overall mild volume loss in the right lower lobe with mild elevation of the right diaphragm. **This report has been created using voice recognition software. It may contain minor errors which are inherent in voice recognition technology. **      Final report electronically signed by Dr. Tank Stuart on 12/24/2017 4:29 AM                  Electronically signed by Jossie Mijares MD on 12/25/2017 at 1:28 PM

## 2017-12-25 NOTE — PROGRESS NOTES
[facility]    Renal inpatient Progress Note  12/25/2017 11:29 AM      Pt Name:   Akash Briones  YOB: 1940  Attending:   Fabio Martínez MD    Chief Complaint:   Akash Briones is a 78 y.o. female being followed by nephrology for CAROLINE OWASSO / CKD / metabolic acidosis     Interval History : patient seen and examined by me. Feels okay denies any complaints at this time. She is having some chills and ongoing diarrhea. Patient's blood pressure has been running low since morning and currently receiving some boluses of fluid  Patient's blood cultures are also positive. Scheduled Medications :   linezolid  600 mg Intravenous Q12H    cefepime  1 g Intravenous Q24H    sodium chloride  500 mL Intravenous Once    colchicine  0.6 mg Oral Daily    ergocalciferol  50,000 Units Oral Weekly    ferrous sulfate  325 mg Oral TID WC    mometasone-formoterol  2 puff Inhalation BID    folic acid  1 mg Oral Daily    lactobacillus  1 capsule Oral TID    megestrol  40 mg Oral Daily    pantoprazole  40 mg Oral QAM AC    phenytoin  100 mg Oral TID    pravastatin  20 mg Oral QPM    rifaximin  550 mg Oral BID    sodium bicarbonate  650 mg Oral BID    sodium chloride flush  10 mL Intravenous 2 times per day    enoxaparin  30 mg Subcutaneous Daily      sodium chloride 100 mL/hr at 12/25/17 2719        Vitals :  BP 80/60   Pulse 100   Temp 97.4 °F (36.3 °C) (Oral)   Resp 20   Ht 5' 7\" (1.702 m)   Wt 136 lb 3.2 oz (61.8 kg)   SpO2 100%   BMI 21.33 kg/m²     24HR INTAKE/OUTPUT:      Intake/Output Summary (Last 24 hours) at 12/25/17 1129  Last data filed at 12/25/17 0843   Gross per 24 hour   Intake          4160.32 ml   Output                0 ml   Net          4160.32 ml     Last 3 weights  Wt Readings from Last 3 Encounters:   12/25/17 136 lb 3.2 oz (61.8 kg)   12/20/17 126 lb 3.2 oz (57.2 kg)   10/11/17 141 lb (64 kg)        Physical Exam :  General Appearance:  Well developed.  No

## 2017-12-25 NOTE — PROGRESS NOTES
Pharmacy Note  Bio[employer] Result    Renetta Welch is a 78 y.o. female, with a positive blood culture result    Perfect[employer] message received from Bebeto , laboratory employee on 12/25/2017 at 0300    First Gram stain result: gram positive cocci in pairs    BioFire BCID result: Enterococcus with Yassine/B resistance gene detected    BioFire BCID and gram stain congruent? Yes    Suspected source? urine    Patient chart reviewed for signs/symptoms of infection: Yes  BP (!) 81/49   Pulse 114   Temp 98.2 °F (36.8 °C) (Oral)   Resp 18   Ht 5' 7\" (1.702 m)   Wt 135 lb 4.8 oz (61.4 kg)   SpO2 100%   BMI 21.19 kg/m²   Lab Results   Component Value Date    WBC 16.0 (H) 12/24/2017       Allergies reviewed  Review of patient's allergies indicates no known allergies. Renal function reviewed  Estimated Creatinine Clearance: 12 mL/min (based on SCr of 3.8 mg/dL).     Current antibiotic regimen: Rocephin    Intervention needed: Yes    Individual contacted: Provider Jo Guzmán  12/25/2017 3:23 AM

## 2017-12-26 NOTE — CONSULTS
temperature is 97.4 °F (36.3 °C). Her blood pressure is 98/56 (abnormal) and her pulse is 112. Her respiration is 20 and oxygen saturation is 98%. I/O    Intake/Output Summary (Last 24 hours) at 12/25/17 1909  Last data filed at 12/25/17 1846   Gross per 24 hour   Intake          4293.02 ml   Output              400 ml   Net          3893.02 ml     Patient Vitals for the past 96 hrs (Last 3 readings):   Weight   12/25/17 0422 136 lb 3.2 oz (61.8 kg)   12/24/17 0900 135 lb 4.8 oz (61.4 kg)   12/24/17 0324 125 lb (56.7 kg)       Exam   General Appearance  Awake, alert, pleasant, mildly disoriented, in no acute distress  HEENT - Normal, Head is normocephalic, atraumatic. EOMI, PERRLA  Neck - Supple, symmetrical, trachea midline and Soft, trachea midline and straight  Lungs - no wheezes, rales or rhonchi, aeration good  Cardiovascular - Heart sounds are normal.  Regular rhythm normal rate without murmur, gallop or rub. Abdomen - Soft, nontender, nondistended, no masses or organomegaly  Neurologic - CN II-XII are grossly intact. There are no focal motor or sensory deficits  Skin - No bruising or bleeding  Extremities - No cyanosis, clubbing or edema      Labs/ Radiology/ Other   I reviewed the labs and radiology reports in [facility].   (See actual reports for details)  Lactic Acid  Recent Labs      12/24/17   0650  12/25/17   0318   LACTA  1.5  1.2

## 2017-12-26 NOTE — CARE COORDINATION
12/26/17, 3:32 PM      Jada Matthews       Admitted from: ED 12/24/2017/ [id number] [facility] day: 2   Location: -23/023-A Reason for admit: Sepsis secondary to UTI ([facility] [address] [A41.9, N39.0] Status: IP  Admit order signed?: yes  PMH:  has a past medical history of Adenomatous polyp; HORTENCIA (acute kidney injury) ([facility] [address] CAD (coronary artery disease); Cancer ([facility] [address] DM (diabetes mellitus) ([facility] Hypercholesteremia; Hypertension; Internal hemorrhoids; Lipoma; Obesity; Pneumonia; Use of cane as ambulatory aid; and Wears glasses. Procedure: 12/24 CVC, 12/29 CVC Removal, 12/29 Tunneled Right Internal Jugular PICC Catheter   Pertinent abnormal Imaging:none  Medications:  Scheduled Meds:   [START ON 12/27/2017] daptomycin (CUBICIN RF) in SWFI IV syringe  6 mg/kg Intravenous Q48H    magnesium sulfate  4 g Intravenous Once    midodrine  10 mg Oral TID WC    ciprofloxacin  250 mg Oral Daily    heparin (porcine)  5,000 Units Subcutaneous 3 times per day    hydrocortisone sodium succinate PF  100 mg Intravenous Q6H    ergocalciferol  50,000 Units Oral Weekly    ferrous sulfate  325 mg Oral TID WC    mometasone-formoterol  2 puff Inhalation BID    folic acid  1 mg Oral Daily    lactobacillus  1 capsule Oral TID    megestrol  40 mg Oral Daily    pantoprazole  40 mg Oral QAM AC    phenytoin  100 mg Oral TID    pravastatin  20 mg Oral QPM    rifaximin  550 mg Oral BID    sodium chloride flush  10 mL Intravenous 2 times per day     Continuous Infusions:   sodium chloride 75 mL/hr at 12/26/17 1148    sodium bicarbonate infusion 50 mL/hr at 12/25/17 2105    dextrose        Pertinent Info/Orders/Treatment Plan:  AB/IVF continued. (+) UA: Proteus, elevated renal labs/WBC, (+) blood cultures; gram positive cocci, (+) UA: Turbid; monitor. Palliative Care/ID/Nephrology following  Diet: DIET CARDIAC;   DVT Prophylaxis: Heparin  Smoking status:  reports that she has never smoked.  She has never used smokeless tobacco.   Influenza Vaccination Screening Completed: no, reviewed core measure, updated Yves Wesley RN  Pneumonia Vaccination Screening Completed: no, reviewed core measure, updated Yves Wesley RN  Core measures: monitor  PCP: Norm Siegel MD  Readmission:   Yes  Patient has been readmitted within 4 days s/p IP stay for Acute Renal Failure. Patient went to f/u appointment? Yes at [facility]  If yes, was it within 7 days? Yes at [facility]  Patient was able to fill prescriptions? Yes at [facility]  Patient is taking medications as prescribed? Yes at [facility]  Cause for readmission? Sepsis    Risk Score: 26.75     Discharge Planning  Current Residence:  Nursing Home  Living Arrangements:  Other (Comment) (ECF)   Support Systems:  Family Members, Friends/Neighbors  Current Services PTA:     Potential Assistance Needed:  N/A  Potential Assistance Purchasing Medications:  No  Does patient want to participate in local refill/ meds to beds program?  No  Type of Home Care Services:  None  Patient expects to be discharged to:  [facility]   Expected Discharge date:  12/26/17  Follow Up Appointment: Best Day/ Time: Tuesday AM    Discharge Plan: plans return [facility] with IV Daptomycin daily x 2 weeks every 48h per ID, has nebulizers, cane     Evaluation: yes    Update: Creatinine 2.9, H 6.7; blood transfusion orders on chart; (+) VRE Fecal Culture, (+) S. Epidermidis/E. Faecium Blood Culture, (+) UA: P.  Mirabilis; AB continued  12/28/17   12:45 PM

## 2017-12-26 NOTE — PROGRESS NOTES
Discussed with primary RN and palliative care will plan to see the patient tomorrow due to time constraints.

## 2017-12-26 NOTE — PROGRESS NOTES
Good improvement in BP with restarting midodrine. If the patient develops new ICU needs we can assist with, please don't hesitate to re-consult us.

## 2017-12-26 NOTE — PLAN OF CARE
Outcome: Not Met This Shift  BUN and creatine are elevated. No urine outtput. Dr Chris Ortega aware. Oral medications changed. Problem: Discharge Planning:  Goal: Discharged to appropriate level of care  Discharged to appropriate level of care   Outcome: Not Met This Shift  Plan for pt to return to [facility] upon completion of care. Problem: Falls - Risk of:  Goal: Will remain free from falls  Will remain free from falls   Outcome: Met This Shift  No falls noted this shift. Patient is on bedrest.  Bed kept in low position. Safe environment maintained. Bedside table & call light in reach. Pt does not use call light appropriately when needing assistance. Frequent rounding. Increase in hourly rounding. Goal: Absence of physical injury  Absence of physical injury   Outcome: Met This Shift  Bed in low position. Pt does turn herself. Problem: Infection - Central Venous Catheter-Associated Bloodstream Infection:  Goal: Will show no infection signs and symptoms  Will show no infection signs and symptoms   Outcome: Ongoing  Femoral cath observed. Triple lumens have been flushed and capped when not in use with alcohol bathed cap. Problem: Mobility - Impaired:  Goal: Mobility will improve  Mobility will improve   Outcome: Not Met This Shift  Pt is on strict bedrest, d/t femoral line in place. Side rails 3/4 up. Problem: Nutrition Deficit:  Goal: Ability to achieve adequate nutritional intake will improve  Ability to achieve adequate nutritional intake will improve   Outcome: Ongoing  Poor oral intake. Enc fluids. PT is refusing megace. Enc family to bring in foods pt likes. Problem: Sensory:  Goal: General experience of comfort will improve  General experience of comfort will improve   Outcome: Ongoing  Restless. Continually itching. BUN and creatine are elevated. Lotion applied to arms and back decreased need to scratch temporarily.     Problem: Urinary Elimination:  Goal: Ability to reestablish a normal urinary

## 2017-12-26 NOTE — PLAN OF CARE
Problem: Discharge Planning:  Goal: Discharged to appropriate level of care  Discharged to appropriate level of care   Outcome: Ongoing  See SW note, from [facility]

## 2017-12-26 NOTE — PROGRESS NOTES
phenytoin  100 mg Oral TID    pravastatin  20 mg Oral QPM    rifaximin  550 mg Oral BID    sodium chloride flush  10 mL Intravenous 2 times per day      sodium chloride 75 mL/hr at 12/26/17 1148    sodium bicarbonate infusion 50 mL/hr at 12/25/17 2105    dextrose       glucose, dextrose, glucagon (rDNA), dextrose, albuterol, ondansetron, oxyCODONE-acetaminophen, sodium chloride flush, acetaminophen, diphenhydrAMINE      LABS:     CBC:   Recent Labs      12/24/17   0650   12/24/17   2020  12/25/17   0318  12/26/17   0540   WBC  16.0*   --    --   16.0*  14.1*   HGB  7.7*   < >  7.6*  7.3*  7.1*   PLT  202   --    --   189  169    < > = values in this interval not displayed. BMP:  Recent Labs      12/24/17   0650  12/25/17 0318  12/26/17   0540   NA  141  142  142   K  5.5*  4.5  3.7   CL  109  112*  110   CO2  15*  14*  18*   BUN  35*  32*  28*   CREATININE  3.8*  3.5*  3.2*   GLUCOSE  56*  47*  120*     Calcium:  Recent Labs      12/26/17   0540   CALCIUM  7.4*     Ionized Calcium:No results for input(s): IONCA in the last 72 hours. Magnesium:  Recent Labs      12/26/17   0540   MG  1.3*     Phosphorus:  Recent Labs      12/26/17   0540   PHOS  2.8     BNP:No results for input(s): BNP in the last 72 hours. Glucose:  Recent Labs      12/25/17   1813  12/25/17   1941  12/26/17   0742   POCGLU  112*  107  108     HgbA1C: No results for input(s): LABA1C in the last 72 hours. INR:   Recent Labs      12/24/17   0650  12/25/17   0318   INR  1.45*  1.45*     Hepatic: Recent Labs      12/24/17   0650  12/26/17   0540   ALKPHOS  169*   --    ALT  14   --    AST  14   --    PROT  4.1*   --    BILITOT  0.3   --    LABALBU  2.0*  1.9*     Amylase and Lipase:  Recent Labs      12/25/17   0318   LACTA  1.2     Lactic Acid:   Recent Labs      12/25/17   0318   LACTA  1.2     Troponin: Recent Labs      12/24/17   0650   CKTOTAL  69     BNP: No results for input(s): BNP in the last 72 hours.     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    S/P right hemicolectomy Z90.49    Hypokalemia E87.6    CKD (chronic kidney disease) stage 3, GFR 30-59 ml/min N18.3    Debilitated patient H30.34    Metabolic acidosis G94.7    Iron deficiency anemia due to chronic blood loss D50.0    Vitamin D deficiency E55.9    Chronic kidney disease (CKD), stage IV (severe) (HCC) N18.4    Malnutrition (HCC) E46    Weight loss R63.4    Anemia of chronic disease D63.8    Anemia in stage 3 chronic kidney disease N18.3, R99.0    Metabolic acidosis O58.7    Diarrhea R19.7    Hyperchloremic acidosis E87.2    Fecal incontinence R15.9    Adenomatous polyps D36.9    Bile salt-induced diarrhea  bile salt sequestration dfollowinh hemicolectomy K90.89    Hyperkalemia E87.5    Volume depletion, gastrointestinal loss E86.9    Physical deconditioning R53.81    Elevated alkaline phosphatase level R74.8    Acute on chronic renal insufficiency N28.9, N18.9    Severe sepsis (HCC) A41.9, R65.20    Urinary tract infection in female N39.0         ASSESSMENT/PLAN   Sepsis due to VRE  Likely source Urine  CKD  UTI with proteus will add oral cipro for 3 days    Tammi Jacobo MD, FACP 12/26/2017 1:51 PM

## 2017-12-26 NOTE — PROGRESS NOTES
Hospitalist Progress Note    Patient:  Mary Jeffers      Unit/Bed:4K-23/023-A    YOB: 1940    MRN: [id number]       Acct: [de-identified]     PCP: Norm Siegel MD    Date of Admission: 12/24/2017  --------------------------    Chief Complaint:     Mental status changes     Hospital Course:      42-year-old female patient who resides in a nursing progress into the emergency room with mental status changes    Assessment:       1.  severe sepsis with hypotension ( hx of HTN  Likely secondary to urinary tract infection, blood culture grew gram-positiveCocci in chains. 2.  acute on chronic renal failure (recently admitted for the same)  3.  metabolic acidosis, multifactorial  4. Hyper K , resolved  5. Nonspecific troponin elevation, likely secondary to hypotension  6. Normocytic anemia, worsen from before, no overt bleeding  7. Diarrhea, C. Difficile negative         comorbidities:    ·   cognitive impairment  · GERD, PPI  · Dyslipidemia Pravachol  · Seizure disorder, on Dilantin  ·   COPD , no exacerbation   · Gout, on colchicine  ·     Plan:  1.  abx changed to Daptomycin , ID following, awaiting final culture data  2. BP improved with midodrine , will monitor, started on steroid per pulmonary ,   3. Replete Mg  4.  poor urine output. Place morley to monitor urine output, renal US  Showed no obstructive uropathy. Nephrology following. 5.  continue monitor Hb  6. Change Morley catheter to midline if okay with nephrology        Code Status: Full Code         DVT prophylaxis:  Heparin      Disposition:  awaiting improvement in renal function    I discussed my though processes at length with patient. Poor insight   Discussed with RN       ----------------  Subjective:     Patient seen and examined  Overnight events noted  RN and ancillary staff note reviewed      More awake, conversant. No new complaint. Blood pressure is improving.     No urinary reported    She reported suprapubic

## 2017-12-27 NOTE — PLAN OF CARE
General experience of comfort will improve  General experience of comfort will improve   Outcome: Ongoing  Issa is in place due to lack of output the last 3 days, 100 out right after placed. Problem: Urinary Elimination:  Goal: Signs and symptoms of infection will decrease  Signs and symptoms of infection will decrease   Outcome: Ongoing  WBC 16 now 14.1, started on daptomycin for positive blood culture for MRSA. Problem: Pain:  Goal: Pain level will decrease  Pain level will decrease   Outcome: Ongoing  No pain this shift, pain goal is no pain. Will continue to monitor. Comments: Care plan reviewed with patient. Patient verbalize understanding of the plan of care and contribute to goal setting.     Electronically signed by Thalia Fernandez RN on 12/26/2017 at 11:52 PM

## 2017-12-27 NOTE — PLAN OF CARE
infection  Prevent transmision of infection  Outcome: Ongoing  Pt verbalizes understanding of contact precautions. Problem: Discharge Planning:  Goal: Discharged to appropriate level of care  Discharged to appropriate level of care   Outcome: Ongoing  Planning to discharge back to [facility] when medically stable. Problem: Falls - Risk of:  Goal: Will remain free from falls  Will remain free from falls   Outcome: Ongoing  Pt remains in bed this shift, turning every 2 hours and as needed, will continue to encourage activity as tolerated, bed alarm on at all times. Goal: Absence of physical injury  Absence of physical injury   Outcome: Completed Date Met: 12/27/17      Problem: Infection - Central Venous Catheter-Associated Bloodstream Infection:  Goal: Will show no infection signs and symptoms  Will show no infection signs and symptoms   Outcome: Ongoing  Pt remains afebrile this shift, WBC increased from yesterday, however pt is on steroids, will continue to monitor, CVC site remains clean, dry, and intact. Problem: Mobility - Impaired:  Goal: Mobility will improve  Mobility will improve   Pt remains in bed this shift, turning every 2 hours and as needed, will continue to encourage activity as tolerated. Problem: Nutrition Deficit:  Goal: Ability to achieve adequate nutritional intake will improve  Ability to achieve adequate nutritional intake will improve   Outcome: Completed Date Met: 34/59/81  duplicate    Problem: Sensory:  Goal: General experience of comfort will improve  General experience of comfort will improve   Outcome: Ongoing  Pt experiences no pain with morley catheter, will continue to monitor. Problem: Urinary Elimination:  Goal: Signs and symptoms of infection will decrease  Signs and symptoms of infection will decrease   Outcome: Ongoing  Pt remains afebrile this shift, WBC increased from yesterday, however pt is on steroids, will continue to monitor.   Goal: Complications related to the

## 2017-12-27 NOTE — PROGRESS NOTES
Spoke to Dr. Padmini Hartman who states it's ok for pt to have midline or PICC placed for long term atbs.

## 2017-12-27 NOTE — PROGRESS NOTES
and examined  Overnight events noted  RN and ancillary staff note reviewed     no new complain today  Blood pressure reading improved     She reported suprapubic discomfort           Diet: DIET CARDIAC;      Medications:  Reviewed    Infusion Medications    sodium chloride 60 mL/hr at 12/27/17 0850    dextrose       Scheduled Medications    cefUROXime  250 mg Oral Daily    daptomycin (CUBICIN RF) in SWFI IV syringe  6 mg/kg Intravenous Q48H    midodrine  10 mg Oral TID     heparin (porcine)  5,000 Units Subcutaneous 3 times per day    ergocalciferol  50,000 Units Oral Weekly    ferrous sulfate  325 mg Oral TID WC    mometasone-formoterol  2 puff Inhalation BID    folic acid  1 mg Oral Daily    lactobacillus  1 capsule Oral TID    megestrol  40 mg Oral Daily    pantoprazole  40 mg Oral QAM AC    phenytoin  100 mg Oral TID    pravastatin  20 mg Oral QPM    rifaximin  550 mg Oral BID    sodium chloride flush  10 mL Intravenous 2 times per day     PRN Meds: glucose, dextrose, glucagon (rDNA), dextrose, albuterol, ondansetron, oxyCODONE-acetaminophen, sodium chloride flush, acetaminophen, diphenhydrAMINE      Intake/Output Summary (Last 24 hours) at 12/27/17 1318  Last data filed at 12/27/17 0447   Gross per 24 hour   Intake          2575.35 ml   Output              150 ml   Net          2425.35 ml       Diet:  DIET CARDIAC; Exam:  /63   Pulse 65   Temp 97.5 °F (36.4 °C) (Oral)   Resp 16   Ht 5' 7\" (1.702 m)   Wt 150 lb 4.8 oz (68.2 kg)   SpO2 92%   BMI 23.54 kg/m²     unchanged   Gen: Not in distress. Alert. Head: Normocephalic. Atraumatic. Eyes: Conjunctivae/corneas clear. ENT: Oral mucosa moist  Neck: No JVD. No obvious thyromegaly. CVS: Nml S1S2, no MRG, RRR  Pulmomary: Clear bilaterally. No crackles. No wheezes. Gastrointestinal: Soft, slight suprapubic tenderness  , non distend,  Positive bowel sounds. Musculoskeletal: No edema. Warm  Neuro: No focal deficit.  Moves extremity

## 2017-12-28 NOTE — FLOWSHEET NOTE
12/27/17 1658   Provider Notification   Reason for Communication Evaluate  (HR dropping into 40s)   Provider Name Dr. Queenie Davila   Provider Notification Physician   Method of Communication Secure Message   Response No new orders  (Acknowledges HR, no new orders)   Notification Time Ira Davila aware of HR dropping into 40s frequently this afternoon, otherwise in 60s, no new orders received. Will continue to monitor.

## 2017-12-28 NOTE — PROGRESS NOTES
GLUCOSEU, BILIRUBINUR, UROBILINOGEN, KETUA, LABCAST, LABCASTTY, AMORPHOS in the last 72 hours.     Invalid input(s): CRYSTALS  Micro:   Lab Results   Component Value Date    BC No growth-preliminary 12/27/2017         IMAGING:         Problem list of patient:     Patient Active Problem List   Diagnosis Code    Hypertension I10    Lymphedema I89.0    Adenomatous polyp D36.9    Internal hemorrhoids K64.8    Tubulovillous adenoma of colon D12.6    S/P right hemicolectomy Z90.49    Hypokalemia E87.6    CKD (chronic kidney disease) stage 3, GFR 30-59 ml/min N18.3    Debilitated patient L19.49    Metabolic acidosis L57.2    Iron deficiency anemia due to chronic blood loss D50.0    Vitamin D deficiency E55.9    Chronic kidney disease (CKD), stage IV (severe) (HCC) N18.4    Malnutrition (HCC) E46    Weight loss R63.4    Anemia of chronic disease D63.8    Anemia in stage 3 chronic kidney disease N18.3, U94.9    Metabolic acidosis U00.9    Diarrhea R19.7    Hyperchloremic acidosis E87.2    Fecal incontinence R15.9    Adenomatous polyps D36.9    Bile salt-induced diarrhea  bile salt sequestration dfollowinh hemicolectomy K90.89    Hyperkalemia E87.5    Volume depletion, gastrointestinal loss E86.9    Physical deconditioning R53.81    Elevated alkaline phosphatase level R74.8    Acute on chronic renal insufficiency N28.9, N18.9    Severe sepsis (HCC) A41.9, R65.20    Urinary tract infection in female N39.0    Bacteremia R78.81         ASSESSMENT/PLAN   Sepsis due to VRE  Likely source Urine  CKD  UTI with proteus antibiotic changed to cefuroxime due to resistance  Leukocytosis likely related to steroid  Repeat blood cx  Result pending so far negative  Tricia Reina MD, FACP 12/28/2017 1:27 PM

## 2017-12-28 NOTE — PROGRESS NOTES
Patient refuses to turn as recommended. Pt does shift weight in bed but does not allow a pillow to be placed under each side Q2H. Education provided regarding the benefits of repositioning and the risk to skin integrity associated with not repositioning as recommended.

## 2017-12-28 NOTE — PROGRESS NOTES
----------------  Subjective:     Patient seen and examined  Overnight events noted  RN and ancillary staff note reviewed  No new complain   Low hb noted this morning no overt bleeding           Diet: DIET CARDIAC;      Medications:  Reviewed    Infusion Medications    sodium chloride      sodium chloride 60 mL/hr at 12/28/17 0014    dextrose       Scheduled Medications    sodium chloride  250 mL Intravenous Once    sodium chloride  250 mL Intravenous Once    potassium chloride  40 mEq Oral Daily with breakfast    cefUROXime  250 mg Oral Daily    daptomycin (CUBICIN RF) in SWFI IV syringe  6 mg/kg Intravenous Q48H    midodrine  10 mg Oral TID WC    heparin (porcine)  5,000 Units Subcutaneous 3 times per day    ergocalciferol  50,000 Units Oral Weekly    ferrous sulfate  325 mg Oral TID WC    mometasone-formoterol  2 puff Inhalation BID    folic acid  1 mg Oral Daily    lactobacillus  1 capsule Oral TID    megestrol  40 mg Oral Daily    pantoprazole  40 mg Oral QAM AC    phenytoin  100 mg Oral TID    pravastatin  20 mg Oral QPM    rifaximin  550 mg Oral BID    sodium chloride flush  10 mL Intravenous 2 times per day     PRN Meds: glucose, dextrose, glucagon (rDNA), dextrose, albuterol, ondansetron, oxyCODONE-acetaminophen, sodium chloride flush, acetaminophen, diphenhydrAMINE      Intake/Output Summary (Last 24 hours) at 12/28/17 1503  Last data filed at 12/28/17 1411   Gross per 24 hour   Intake          1934.87 ml   Output              225 ml   Net          1709.87 ml       Diet:  DIET CARDIAC; Exam:  BP (!) 98/52   Pulse 74   Temp 96.8 °F (36 °C) (Axillary)   Resp 18 Comment: Lungs clear  Ht 5' 7\" (1.702 m)   Wt 151 lb (68.5 kg)   SpO2 98%   BMI 23.65 kg/m²     unchanged   Gen: Not in distress. Alert. Head: Normocephalic. Atraumatic. Eyes: Conjunctivae/corneas clear. ENT: Oral mucosa moist  Neck: No JVD. No obvious thyromegaly.   CVS: Nml S1S2, no MRG, RRR  Pulmomary: Clear bilaterally. No crackles. No wheezes. Gastrointestinal: Soft, slight suprapubic tenderness  , non distend,  Positive bowel sounds. Musculoskeletal: No edema. Warm  Neuro: No focal deficit. Moves extremity spontaneously. Psychiatry: Appropriate affect. Not agitated. Labs:   Recent Labs      12/26/17   0540  12/27/17   0504  12/28/17   0406   WBC  14.1*  22.7*  20.1*   HGB  7.1*  7.0*  6.7*   HCT  21.4*  21.3*  20.0*   PLT  169  193  188     Recent Labs      12/26/17   0540  12/27/17   0504  12/28/17   0406   NA  142  141  145   K  3.7  3.2*  3.5   CL  110  107  112*   CO2  18*  19*  17*   BUN  28*  28*  29*   CREATININE  3.2*  3.0*  2.9*   CALCIUM  7.4*  7.6*  8.0*   PHOS  2.8  3.1  3.1     No results for input(s): AST, ALT, BILIDIR, BILITOT, ALKPHOS in the last 72 hours. No results for input(s): INR in the last 72 hours. No results for input(s): Kylee Alvaro in the last 72 hours. Urinalysis:      Lab Results   Component Value Date    NITRU NEGATIVE 12/24/2017    WBCUA  12/24/2017    BACTERIA NONE 12/24/2017    RBCUA 0-2 12/24/2017    BLOODU SMALL 12/24/2017    SPECGRAV 1.013 07/21/2017    GLUCOSEU NEGATIVE 12/24/2017    GLUCOSEU NEGATIVE 03/01/2012       Radiology:  US RENAL COMPLETE   Final Result   No evidence of obstructive uropathy            **This report has been created using voice recognition software. It may contain minor errors which are inherent in voice recognition technology. **      Final report electronically signed by Dr. Tisha Smith on 12/25/2017 4:42 PM      XR Chest Portable   Final Result   1. Mild platelike atelectasis and overall mild volume loss in the right lower lobe with mild elevation of the right diaphragm. **This report has been created using voice recognition software. It may contain minor errors which are inherent in voice recognition technology. **      Final report electronically signed by Dr. Nav Olvera on 12/24/2017 4:29 AM      IR FLUORO GUIDED CVA DEVICE PLACEMENT    (Results Pending)               Electronically signed by Moses Moralez MD on 12/28/2017 at 3:03 PM

## 2017-12-28 NOTE — PLAN OF CARE
Problem: Discharge Planning:  Goal: Discharged to appropriate level of care  Discharged to appropriate level of care   Outcome: Ongoing  Pt currently resides at [facility]. Pts sister and brother at bedside. Discussed code status with pt and family. Pt states she wishes to be a full code at this time. Spiritual Care consulted for assistance with advanced directives. Problem: Support with Decision-Making  Goal: Verbalization of thoughts/feelings regarding decision  Outcome: Ongoing  Palliative Care information left at bedside. Discussed code status with pt and family. Spiritual Care consulted for assistance with advanced directives. Pt withdrawn and only participates in about 20% of conversation with nurse and family regarding her care.

## 2017-12-28 NOTE — FLOWSHEET NOTE
12/28/17 0434 12/28/17 0435   Provider Notification   Reason for Communication Critical Value (comment)  (Hgb 6.7 and Hct 20.0) New orders   Provider Name Dr. Pop Burkett   Provider Notification Physician Physician   Method of Communication Call Call   Response Waiting for response See orders   Notification Time [phone] Lea Litten Dr. Purnell Risk to report critical hemoglobin of 6.7 and critical hematocrit of 20.0. Orders were given to do a type and screen and transfuse one unit of blood. Recheck H&H in 4-5 hours. Patient in stable condition at this time.

## 2017-12-28 NOTE — PROGRESS NOTES
Intravenous 2 times per day      sodium chloride 60 mL/hr at 12/27/17 0850    dextrose       glucose, dextrose, glucagon (rDNA), dextrose, albuterol, ondansetron, oxyCODONE-acetaminophen, sodium chloride flush, acetaminophen, diphenhydrAMINE      LABS:     CBC:   Recent Labs      12/25/17   0318  12/26/17   0540  12/27/17   0504   WBC  16.0*  14.1*  22.7*   HGB  7.3*  7.1*  7.0*   PLT  189  169  193     BMP:    Recent Labs      12/25/17   0318  12/26/17   0540  12/27/17   0504   NA  142  142  141   K  4.5  3.7  3.2*   CL  112*  110  107   CO2  14*  18*  19*   BUN  32*  28*  28*   CREATININE  3.5*  3.2*  3.0*   GLUCOSE  47*  120*  119*     Calcium:  Recent Labs      12/27/17   0504   CALCIUM  7.6*     Ionized Calcium:No results for input(s): IONCA in the last 72 hours. Magnesium:  Recent Labs      12/27/17   0504   MG  2.4     Phosphorus:  Recent Labs      12/27/17   0504   PHOS  3.1     BNP:No results for input(s): BNP in the last 72 hours. Glucose:  Recent Labs      12/27/17   0723  12/27/17   1121  12/27/17   1642   POCGLU  105  112*  97     HgbA1C: No results for input(s): LABA1C in the last 72 hours. INR:   Recent Labs      12/25/17   0318   INR  1.45*     Hepatic:   Recent Labs      12/26/17   0540  12/27/17   0504   LABALBU  1.9*  2.0*     Amylase and Lipase:  Recent Labs      12/25/17   0318   LACTA  1.2     Lactic Acid:   Recent Labs      12/25/17   0318   LACTA  1.2     Troponin: No results for input(s): CKTOTAL, CKMB, TROPONINI in the last 72 hours. BNP: No results for input(s): BNP in the last 72 hours. CULTURES:   UA: No results for input(s): SPECGRAV, PHUR, COLORU, CLARITYU, MUCUS, PROTEINU, BLOODU, RBCUA, WBCUA, BACTERIA, NITRU, GLUCOSEU, BILIRUBINUR, UROBILINOGEN, KETUA, LABCAST, LABCASTTY, AMORPHOS in the last 72 hours.     Invalid input(s): CRYSTALS  Micro:   Lab Results   Component Value Date    BC No growth-preliminary 12/24/2017    BC  12/24/2017     Isolates of Methicillin Resistant

## 2017-12-29 NOTE — PROGRESS NOTES
12/27/17 1454    midodrine (PROAMATINE) tablet 10 mg, 10 mg, Oral, TID , Yony Mcduffie MD, 10 mg at 12/29/17 0914    heparin (porcine) injection 5,000 Units, 5,000 Units, Subcutaneous, 3 times per day, Tanesha Jean MD, 5,000 Units at 12/29/17 0515    glucose (GLUTOSE) 40 % oral gel 15 g, 15 g, Oral, PRN, Chaim Jones MD    dextrose 50 % solution 12.5 g, 12.5 g, Intravenous, PRN, hCaim Jones MD, 12.5 g at 12/29/17 1302    glucagon (rDNA) injection 1 mg, 1 mg, Intramuscular, PRN, Chaim Jones MD    dextrose 5 % solution, 100 mL/hr, Intravenous, PRN, Chaim Jones MD    albuterol (PROVENTIL) nebulizer solution 0.83 mg, 0.83 mg, Nebulization, Q6H PRN, Chaim Jones MD    ergocalciferol (ERGOCALCIFEROL) capsule 50,000 Units, 50,000 Units, Oral, Weekly, Chaim Jones MD, 50,000 Units at 12/24/17 1932    ferrous sulfate tablet 325 mg, 325 mg, Oral, TID , Chaim Jones MD, 325 mg at 12/29/17 0914    mometasone-formoterol (DULERA) 200-5 MCG/ACT inhaler 2 puff, 2 puff, Inhalation, BID, Chaim Jones MD, 2 puff at 03/67/80 2564    folic acid (FOLVITE) tablet 1 mg, 1 mg, Oral, Daily, Chaim Jones MD, 1 mg at 12/29/17 0914    lactobacillus (CULTURELLE) capsule 1 capsule, 1 capsule, Oral, TID, Chaim Jones MD, 1 capsule at 12/29/17 0914    megestrol (MEGACE) 40 MG/ML suspension 40 mg, 40 mg, Oral, Daily, Chaim Jones MD, 40 mg at 12/29/17 0914    ondansetron (ZOFRAN) tablet 4 mg, 4 mg, Oral, Q8H PRN, Chaim Jones MD    oxyCODONE-acetaminophen (PERCOCET) 5-325 MG per tablet 1 tablet, 1 tablet, Oral, Q8H PRN, Chaim Jones MD    phenytoin (DILANTIN) ER capsule 100 mg, 100 mg, Oral, TID, Chaim Jones MD, 100 mg at 12/29/17 0914    pravastatin (PRAVACHOL) tablet 20 mg, 20 mg, Oral, QPM, Chaim Jones MD, 20 mg at 12/28/17 1739    rifaximin (XIFAXAN) tablet 550 mg, 550 mg, Oral, BID, Chaim Jones MD, 550 mg at 12/29/17 0914    sodium chloride flush 0.9 % injection 10 mL, 10 mL, Intravenous, 2 times per day, Felipa Teresa White MD   ferrous sulfate 325 (65 Fe) MG tablet Take 1 tablet by mouth 3 times daily (with meals) 10/11/17   May Cox MD   folic acid (FOLVITE) 1 MG tablet Take 1 tablet by mouth daily 10/11/17   May Cox MD   Lactobacillus (PROBIOTIC ACIDOPHILUS) TABS Take 1 tablet by mouth 3 times daily 10/11/17   May Cox MD   omeprazole (PRILOSEC) 40 MG delayed release capsule Take 1 capsule by mouth daily 10/11/17   May Cox MD   pravastatin (PRAVACHOL) 20 MG tablet Take 1 tablet by mouth every evening 10/11/17   May Cox MD     Additional information:       VITAL SIGNS   Vitals:    12/29/17 1405   BP: 118/68   Pulse: 78   Resp: 19   Temp:    SpO2: 98%       PHYSICAL:   Heart:  [x]Regular rate and rhythm  []Other:    Lungs:  [x]Clear    []Other:    Abdomen: [x]Soft    []Other:    Mental Status: [x]Alert & Oriented  []Other:      PLANNED PROCEDURE   []Biospy []Arteriogram              []Drainage   []Mediport Insertion  []Fistulogram []IV access       []Vertebroplasty / Augmentation  []IVC filter []Dialysis catheter []Biliary drainage  [x]Other: tunneled picc line insertion []CAPD Catheter []Nephrostomy Tube / Stent  SEDATION  Planned agent:[x]Midazolam []Meperidine [x]Sublimaze []Dilaudid []Morphine     []Diazepam  []Other:     ASA Classification:  []1 [x]2 []3 []4 []5  Class 1: A normal healthy patient  Class 2: Pt with mild to moderate systemic disease  Class 3: Severe systemic disease or disturbance  Class 4: Severe systemic disorders that are already life threatening. Class 5: Moribund pt with little chances of survival, for more than 24 hours. Mallampati I Airway Classification:   []1 []2 [x]3 []4    [x]Pre-procedure diagnostic studies complete and results available. Comment:    [x]Previous sedation/anesthesia experiences assessed. Comment:    [x]The patient is an appropriate candidate to undergo the planned procedure sedation and anesthesia.  (Refer to nursing

## 2017-12-29 NOTE — PROGRESS NOTES
Hospitalist Progress Note    Patient:  Fatmata Méndez      Unit/Bed:4K-23/023-A    YOB: 1940    MRN: [id number]       Acct: [de-identified]     PCP: Conner Meraz MD    Date of Admission: 12/24/2017  --------------------------    Chief Complaint:     Mental status changes     Hospital Course:      79-year-old female patient who resides in a nursing progress into the emergency room with mental status changes    Assessment:       1. Severe sepsis with hypotension sec to VRE Likely secondary to urinary tract infection, blood culture grew gE faecium and staph epi  12/24. Negative repeated culture so far. 2. UTI. Proteus grew in the culture   3.  hypotension, multifactorial.  History of chronic hypotension. Improved with  midodrine   4. Acute oliguric on chronic renal failure (recently admitted for the same)  5. Metabolic acidosis, multifactorial, improving   6. Hyper K , resolved, now hypoK  7. Nonspecific troponin elevation, likely secondary to hypotension  8. Normocytic anemia, stable after transfusion. 9. Diarrhea, C. Difficile negative   Resume gi in the past attributed to bacterial overgrowth, on rifaximin  10.  leukocytosis likely secondary to steroid use  11. Mild asymptomatic hypoglycemia , cortisol normal          comorbidities:    · Cognitive impairment  · GERD, PPI  · Dyslipidemia Pravachol  · Seizure disorder, on Dilantin  · COPD , no exacerbation   · Gout, on colchicine  ·     Plan:  1. Continue Daptomycin and ceftine    2. Tunneled central line  Per IR , hopefully today    3. Cr stable but remain oliguric . initiation of renal replacement therapy per nephrology. Code Status: Full Code         DVT prophylaxis:  Heparin      Disposition: Awaiting improvement in renal function,   Central line placement for iv antibiotic therapy. I discussed my though processes at length with patient.  Poor insight   Discussed with RN       ----------------  Subjective:     Patient seen and examined  Overnight events noted  RN and ancillary staff note reviewed       does not have new complain   No interested in conversation       Diet: DIET CARDIAC;      Medications:  Reviewed    Infusion Medications    sodium bicarbonate infusion      dextrose       Scheduled Medications    famotidine  20 mg Oral Daily    potassium chloride  40 mEq Oral Daily with breakfast    cefUROXime  250 mg Oral Daily    daptomycin (CUBICIN RF) in SWFI IV syringe  6 mg/kg Intravenous Q48H    midodrine  10 mg Oral TID WC    heparin (porcine)  5,000 Units Subcutaneous 3 times per day    ergocalciferol  50,000 Units Oral Weekly    ferrous sulfate  325 mg Oral TID WC    mometasone-formoterol  2 puff Inhalation BID    folic acid  1 mg Oral Daily    lactobacillus  1 capsule Oral TID    megestrol  40 mg Oral Daily    phenytoin  100 mg Oral TID    pravastatin  20 mg Oral QPM    rifaximin  550 mg Oral BID    sodium chloride flush  10 mL Intravenous 2 times per day     PRN Meds: glucose, dextrose, glucagon (rDNA), dextrose, albuterol, ondansetron, oxyCODONE-acetaminophen, sodium chloride flush, acetaminophen, diphenhydrAMINE      Intake/Output Summary (Last 24 hours) at 12/29/17 1327  Last data filed at 12/29/17 0344   Gross per 24 hour   Intake          2040.95 ml   Output              400 ml   Net          1640. 95 ml       Diet:  DIET CARDIAC; Exam:  BP (!) 130/92   Pulse 82   Temp 98 °F (36.7 °C) (Oral)   Resp 18   Ht 5' 7\" (1.702 m)   Wt 152 lb 6.4 oz (69.1 kg)   SpO2 97%   BMI 23.87 kg/m²        Gen: Not in distress. Alert. Head: Normocephalic. Atraumatic. Eyes: Conjunctivae/corneas clear. ENT: Oral mucosa moist  Neck: No JVD. No obvious thyromegaly. CVS: Nml S1S2, no MRG, RRR  Pulmomary: Clear bilaterally. No crackles. No wheezes. Gastrointestinal: Soft, slight suprapubic tenderness  , non distend,  Positive bowel sounds. Musculoskeletal: No edema. Warm  Neuro: No focal deficit.  Moves extremity

## 2017-12-29 NOTE — PLAN OF CARE
Problem: Impaired respiratory status  Goal: Clear lung sounds  Outcome: Ongoing  Continue therapy as ordered to improve breath sounds/aeration.

## 2017-12-29 NOTE — PLAN OF CARE
Problem: Falls - Risk of:  Goal: Will remain free from falls  Will remain free from falls   Outcome: Ongoing  Able to help turn in bed. Refuses at times but will turn herself. Patient remains on fall precautions. Falling star in place. Fall band on. Side rails up x 2. Call light within reach. Patient uses call light appropriately. Bed alarm on. Hourly rounding completed.     Problem: Cardiac:  Goal: Ability to maintain an adequate cardiac output will improve  Ability to maintain an adequate cardiac output will improve   Outcome: Ongoing  Continue to monitor. Hypotension improved - continue with midodrine and IVF. Denies any chest pain or shortness of breath      Problem: Nutritional:  Goal: Maintenance of adequate nutrition will improve  Maintenance of adequate nutrition will improve   Outcome: Ongoing  Poor oral intake - refused meals   Hypoglycemic with PRN Dextrose administered IVP x 3  Continue to encourage PO intake   Bowel Sounds x 4.   1 loose watery stool      Problem: Physical Regulation:  Goal: Signs and symptoms of infection will decrease  Signs and symptoms of infection will decrease   Blood cultures Negative - Requesting to remove femoral line and had IR place midline today. Palliative care met with pt and family yesterday  Patient requests to remain a full code  Pt remains in contact isolation VRE - urosepsis     Problem: Discharge Planning:  Goal: Discharged to appropriate level of care  Discharged to appropriate level of care   Outcome: Ongoing  Plans to return to [facility] when stable     Problem: Infection - Central Venous Catheter-Associated Bloodstream Infection:  Goal: Will show no infection signs and symptoms  Will show no infection signs and symptoms   Outcome: Ongoing  Femoral line remains in place at this time IV team called & is aware that femoral line is to be removed.    Patient remains afebrile   WBC elevated however improving - possible steroid induced elevation  New midline catheter placed by Pablo Smith in IR today.       Problem: Support with Decision-Making  Goal: Verbalization of thoughts/feelings regarding decision  Outcome: Ongoing  Spiritual care consulted today  Palliative Care seeing     Comments: Care Plan reviewed in detail with patient. Patient is verbalized understanding and is compliant with current treatment and care plan.

## 2017-12-29 NOTE — CARE COORDINATION
Update: Elevated WBC 18.9, Creatinine 2.7, H 10; monitorSodium Bicarb Gtt continued.  Nephrology/ID/Palliative Care following  Procedure: 12/29 CVC Removal, 12/29 Tunneled Right Internal Jugular PICC Catheter Insertion  Discharge Plan: plans 2 weeks IV Daptomycin at [facility] (precert) when medically cleared; collaborated with Devika Bird  12/29/17  3:08 PM

## 2017-12-29 NOTE — PROGRESS NOTES
26 Pt arrived in IR for procedure. Pt denies c/o pain. 56 Dr. Kymberly Weinberg in to evaluate pt.   1346 Procedure started. 1410 Procedure complete. Ice pack to right chest.   1415 Report called to [employer], RN.   1428 Pt transported to  via bed. Skin natural for race, warm, dry. Respirations even and unlabored at rest. Denies c/o pain.

## 2017-12-29 NOTE — PROGRESS NOTES
rifaximin  550 mg Oral BID    sodium chloride flush  10 mL Intravenous 2 times per day      sodium bicarbonate infusion      dextrose       glucose, dextrose, glucagon (rDNA), dextrose, albuterol, ondansetron, oxyCODONE-acetaminophen, sodium chloride flush, acetaminophen, diphenhydrAMINE      LABS:     CBC:   Recent Labs      12/27/17   0504  12/28/17   0406  12/28/17   1505  12/29/17   1050   WBC  22.7*  20.1*   --   18.9*   HGB  7.0*  6.7*  9.4*  10.0*   PLT  193  188   --   161     BMP:    Recent Labs      12/27/17   0504  12/28/17   0406  12/29/17   0610   NA  141  145  141   K  3.2*  3.5  4.0   CL  107  112*  112*   CO2  19*  17*  14*   BUN  28*  29*  27*   CREATININE  3.0*  2.9*  2.7*   GLUCOSE  119*  66*  58*     Calcium:  Recent Labs      12/29/17   0610   CALCIUM  7.8*     Ionized Calcium:No results for input(s): IONCA in the last 72 hours. Magnesium:  Recent Labs      12/27/17   0504   MG  2.4     Phosphorus:  Recent Labs      12/29/17   0610   PHOS  2.4     BNP:No results for input(s): BNP in the last 72 hours. Glucose:  Recent Labs      12/29/17   0731  12/29/17   0823  12/29/17   1126   POCGLU  52*  89  62*     HgbA1C: No results for input(s): LABA1C in the last 72 hours. INR:   No results for input(s): INR in the last 72 hours. Hepatic:   Recent Labs      12/27/17   0504  12/28/17   0406  12/29/17   0610   LABALBU  2.0*  2.3*  2.3*     Amylase and Lipase:  No results for input(s): LACTA, AMYLASE in the last 72 hours. Lactic Acid:   No results for input(s): LACTA in the last 72 hours. Troponin: No results for input(s): CKTOTAL, CKMB, TROPONINI in the last 72 hours. BNP: No results for input(s): BNP in the last 72 hours. CULTURES:   UA: No results for input(s): SPECGRAV, PHUR, COLORU, CLARITYU, MUCUS, PROTEINU, BLOODU, RBCUA, WBCUA, BACTERIA, NITRU, GLUCOSEU, BILIRUBINUR, UROBILINOGEN, KETUA, LABCAST, LABCASTTY, AMORPHOS in the last 72 hours.     Invalid input(s): CRYSTALS  Micro:   Lab

## 2017-12-29 NOTE — PLAN OF CARE
Problem: Activity:  Goal: Ability to tolerate increased activity will improve  Ability to tolerate increased activity will improve   Outcome: Ongoing  Pt states she does not walk and hasn't in over a year. . Able to help turn in bed. Refuses at times but will turn herself.     Problem: Cardiac:  Goal: Ability to maintain an adequate cardiac output will improve  Ability to maintain an adequate cardiac output will improve   Outcome: Ongoing  Continue to monitor. Pt's BP low  Goal: Ability to achieve and maintain adequate cardiopulmonary perfusion will improve  Ability to achieve and maintain adequate cardiopulmonary perfusion will improve   Outcome: Ongoing        Problem: Nutritional:  Goal: Maintenance of adequate nutrition will improve  Maintenance of adequate nutrition will improve   Outcome: Ongoing  Pt has poor PO intake.        Problem: Physical Regulation:  Goal: Signs and symptoms of infection will decrease  Signs and symptoms of infection will decrease   Outcome: Ongoing  Blood cultures drawn last night, awaiting results. First set preliminary negative  Goal: Complications related to the disease process, condition or treatment will be avoided or minimized  Complications related to the disease process, condition or treatment will be avoided or minimized   Outcome: Ongoing  Palliative care met with pt and family today.  Pt would like to remain a full code  Goal: Prevent transmision of infection  Prevent transmision of infection   Outcome: Ongoing  Pt remains in contact isolation     Problem: Discharge Planning:  Goal: Discharged to appropriate level of care  Discharged to appropriate level of care   Outcome: Ongoing  Plans to return to [facility] when stable     Problem: Falls - Risk of:  Goal: Will remain free from falls  Will remain free from falls   Outcome: Ongoing  Bed alarm in place     Problem: Infection - Central Venous Catheter-Associated Bloodstream Infection:  Goal: Will show no infection signs and symptoms  Will show no infection signs and symptoms   Outcome: Ongoing  Femoral line remains in place. Dr. Deb Pearson states the midline can be placed tomorrow     Problem: Urinary Elimination:  Goal: Signs and symptoms of infection will decrease  Signs and symptoms of infection will decrease   Outcome: Ongoing  Blood cultures drawn last night, awaiting results. First set preliminary negative  Goal: Complications related to the disease process, condition or treatment will be avoided or minimized  Complications related to the disease process, condition or treatment will be avoided or minimized   Outcome: Ongoing  Palliative care met with pt and family today. Pt would like to remain a full code     Problem: Support with Decision-Making  Goal: Verbalization of thoughts/feelings regarding decision  Outcome: Ongoing  Spiritual care consulted today    Comments: Care plan reviewed with patient. Patient verbalize understanding of the plan of care and contribute to goal setting.

## 2017-12-30 NOTE — PROGRESS NOTES
Renal Progress Note    Assessment and Plan: 1. Acute kidney injury better   2. Metabolic acidosis better   3. Diarrhea   4. Hypotension better   5. Skin rash likely drug reaction ? 6. Sepsis   PLAN:  Reviewed labs and result discussed with the patient   Reviewed medications   Continue IV fluid   Skin rash per ID  Discussed with patient and staff  AM labs   Will follow     Patient Active Problem List:     Hypertension     Lymphedema     Adenomatous polyp     Internal hemorrhoids     Tubulovillous adenoma of colon     S/P right hemicolectomy     Hypokalemia     CKD (chronic kidney disease) stage 3, GFR 30-59 ml/min     Debilitated patient     Metabolic acidosis     Iron deficiency anemia due to chronic blood loss     Vitamin D deficiency     Chronic kidney disease (CKD), stage IV (severe) (HCC)     Malnutrition (HCC)     Weight loss     Anemia of chronic disease     Anemia in stage 3 chronic kidney disease     Metabolic acidosis     Diarrhea     Hyperchloremic acidosis     Fecal incontinence     Adenomatous polyps     Bile salt-induced diarrhea  bile salt sequestration dfollowinh hemicolectomy     Hyperkalemia     Volume depletion, gastrointestinal loss     Physical deconditioning     Elevated alkaline phosphatase level     Acute on chronic renal insufficiency     Severe sepsis (HCC)     Urinary tract infection in female     Bacteremia      Subjective:   Admit Date: 12/24/2017    Interval History:   Seeing for acute kidney injury and metabolic acidosis  Awake and alert   Feels better   Updated by the staff  Appetite is better   78year old lady admitted for diarrhea       Medications:   Scheduled Meds:   famotidine  20 mg Oral Daily    potassium chloride  40 mEq Oral Daily with breakfast    cefUROXime  250 mg Oral Daily    daptomycin (CUBICIN RF) in SWFI IV syringe  6 mg/kg Intravenous Q48H    midodrine  10 mg Oral TID WC    heparin (porcine)  5,000 Units Subcutaneous 3 times per day    ergocalciferol  50,000

## 2017-12-30 NOTE — PROGRESS NOTES
Hospitalist Progress Note    Patient:  Marion Robles      Unit/Bed:4K-23/023-A    YOB: 1940    MRN: [id number]       Acct: [de-identified]     PCP: Terrance Kumar MD    Date of Admission: 12/24/2017  --------------------------    Chief Complaint:     Mental status changes     Hospital Course:      61-year-old female patient who resides in a nursing progress into the emergency room with mental status changes    Assessment:       1. Severe sepsis with hypotension sec to VRE Likely secondary to urinary tract infection, blood culture grew gE faecium and staph epi  12/24. Negative repeated culture so far. 2. UTI. Proteus grew in the culture   3.  hypotension, multifactorial.  History of chronic hypotension. Improved with  midodrine   4. Acute oliguric on chronic renal failure (recently admitted for the same)  5. Metabolic acidosis, multifactorial, improving   6. Hyper K , resolved, now hypoK  7. Nonspecific troponin elevation, likely secondary to hypotension  8. Normocytic anemia, stable after transfusion. 9. Diarrhea, C. Difficile negative   Resume gi in the past attributed to bacterial overgrowth, on rifaximin  10.  leukocytosis likely secondary to steroid use  11. Mild asymptomatic hypoglycemia , cortisol normal , improving  12. Skin rash         comorbidities:    · Cognitive impairment  · GERD, PPI  · Dyslipidemia Pravachol  · Seizure disorder, on Dilantin  · COPD , no exacerbation   · Gout, on colchicine  ·     Plan:  1. Unclear reason for the rash, could be sec to abx, ID notified    2. Cr improving , urine output improving . 3. Monitor glu  4. Start imodium         Code Status: Full Code         DVT prophylaxis:  Heparin      Disposition: Awaiting improvement in renal function,   Central line placement for iv antibiotic therapy. I discussed my though processes at length with patient.  Poor insight   Discussed with RN       ----------------  Subjective:     Patient seen and examined  Overnight events noted  RN and ancillary staff note reviewed       does not have new complain   No interested in conversation       Diet: DIET CARDIAC;      Medications:  Reviewed    Infusion Medications    sodium bicarbonate infusion 75 mL/hr at 12/30/17 1132    dextrose       Scheduled Medications    midodrine  5 mg Oral TID WC    loperamide  2 mg Oral TID    famotidine  20 mg Oral Daily    potassium chloride  40 mEq Oral Daily with breakfast    cefUROXime  250 mg Oral Daily    daptomycin (CUBICIN RF) in SWFI IV syringe  6 mg/kg Intravenous Q48H    heparin (porcine)  5,000 Units Subcutaneous 3 times per day    ergocalciferol  50,000 Units Oral Weekly    ferrous sulfate  325 mg Oral TID WC    mometasone-formoterol  2 puff Inhalation BID    folic acid  1 mg Oral Daily    lactobacillus  1 capsule Oral TID    megestrol  40 mg Oral Daily    phenytoin  100 mg Oral TID    pravastatin  20 mg Oral QPM    rifaximin  550 mg Oral BID    sodium chloride flush  10 mL Intravenous 2 times per day     PRN Meds: glucose, dextrose, glucagon (rDNA), dextrose, albuterol, ondansetron, oxyCODONE-acetaminophen, sodium chloride flush, acetaminophen, diphenhydrAMINE      Intake/Output Summary (Last 24 hours) at 12/30/17 1350  Last data filed at 12/30/17 1345   Gross per 24 hour   Intake          2864.25 ml   Output              903 ml   Net          1961.25 ml       Diet:  DIET CARDIAC; Exam:  BP (!) 149/79   Pulse 97   Temp 98.1 °F (36.7 °C) (Oral)   Resp 18   Ht 5' 7\" (1.702 m)   Wt 153 lb (69.4 kg)   SpO2 98%   BMI 23.96 kg/m²      unchanged   Gen: Not in distress. Alert. Head: Normocephalic. Atraumatic. Eyes: Conjunctivae/corneas clear. ENT: Oral mucosa moist  Neck: No JVD. No obvious thyromegaly. CVS: Nml S1S2, no MRG, RRR  Pulmomary: Clear bilaterally. No crackles. No wheezes. Gastrointestinal: Soft, slight suprapubic tenderness  , non distend,  Positive bowel sounds.   Musculoskeletal: No **This report has been created using voice recognition software. It may contain minor errors which are inherent in voice recognition technology. **      Final report electronically signed by Dr. Shakeel Osborn on 12/24/2017 4:29 AM                  Electronically signed by Shaaron Leyden, MD on 12/30/2017 at 1:50 PM

## 2017-12-31 NOTE — PROGRESS NOTES
12/24/2017     Hepatic: Recent Labs      12/29/17   0610  12/30/17   0515  12/31/17   0450   LABALBU  2.3*  2.0*  1.9*         Meds:  Infusion:    sodium bicarbonate infusion 75 mL/hr at 12/31/17 0543    dextrose       Meds:    midodrine  5 mg Oral TID WC    loperamide  2 mg Oral TID    famotidine  20 mg Oral Daily    potassium chloride  40 mEq Oral Daily with breakfast    daptomycin (CUBICIN RF) in SWFI IV syringe  6 mg/kg Intravenous Q48H    heparin (porcine)  5,000 Units Subcutaneous 3 times per day    ergocalciferol  50,000 Units Oral Weekly    ferrous sulfate  325 mg Oral TID WC    mometasone-formoterol  2 puff Inhalation BID    folic acid  1 mg Oral Daily    lactobacillus  1 capsule Oral TID    megestrol  40 mg Oral Daily    phenytoin  100 mg Oral TID    pravastatin  20 mg Oral QPM    rifaximin  550 mg Oral BID    sodium chloride flush  10 mL Intravenous 2 times per day     Meds prn: glucose, dextrose, glucagon (rDNA), dextrose, albuterol, ondansetron, oxyCODONE-acetaminophen, sodium chloride flush, acetaminophen, diphenhydrAMINE       Impression and Plan:  1. HORTENCIA on CKD III: secondary to volume contraction  2. Met acidosis: improving on bicarbonate drip  3. Diarrhea  4. Hypophosphatemia  5. Anemia  6.  Leukoctyosis  - okay to stop Bicarbonate infusion  - change fluids to isotonic saline low rate  - stop as oral intake improves  - replace phos    D/W patient and RN    Everett Hoover MD  [facility]

## 2017-12-31 NOTE — PLAN OF CARE
Problem: Impaired respiratory status  Goal: Clear lung sounds  Outcome: Ongoing  Patient has clear and diminished breath sounds. Continue treatment as ordered.

## 2018-01-01 ENCOUNTER — ANESTHESIA (OUTPATIENT)
Dept: ENDOSCOPY | Age: 78
DRG: 871 | End: 2018-01-01
Payer: COMMERCIAL

## 2018-01-01 ENCOUNTER — ANESTHESIA EVENT (OUTPATIENT)
Dept: ENDOSCOPY | Age: 78
DRG: 871 | End: 2018-01-01
Payer: COMMERCIAL

## 2018-01-01 VITALS
BODY MASS INDEX: 23.76 KG/M2 | DIASTOLIC BLOOD PRESSURE: 66 MMHG | HEIGHT: 67 IN | WEIGHT: 151.4 LBS | TEMPERATURE: 96 F | HEART RATE: 91 BPM | SYSTOLIC BLOOD PRESSURE: 142 MMHG | RESPIRATION RATE: 20 BRPM | OXYGEN SATURATION: 98 %

## 2018-01-01 VITALS — DIASTOLIC BLOOD PRESSURE: 51 MMHG | OXYGEN SATURATION: 100 % | SYSTOLIC BLOOD PRESSURE: 78 MMHG

## 2018-01-01 LAB
ALBUMIN SERPL-MCNC: 1.9 G/DL (ref 3.5–5.1)
ALBUMIN SERPL-MCNC: 2 G/DL (ref 3.5–5.1)
ALBUMIN SERPL-MCNC: 2.3 G/DL (ref 3.5–5.1)
ALBUMIN SERPL-MCNC: 2.3 G/DL (ref 3.5–5.1)
ANION GAP SERPL CALCULATED.3IONS-SCNC: 10 MEQ/L (ref 8–16)
ANION GAP SERPL CALCULATED.3IONS-SCNC: 11 MEQ/L (ref 8–16)
ANION GAP SERPL CALCULATED.3IONS-SCNC: 14 MEQ/L (ref 8–16)
ANION GAP SERPL CALCULATED.3IONS-SCNC: 15 MEQ/L (ref 8–16)
ANISOCYTOSIS: ABNORMAL
BANDED NEUTROPHILS ABSOLUTE COUNT: 0.4 THOU/MM3
BANDED NEUTROPHILS ABSOLUTE COUNT: 0.4 THOU/MM3
BANDS PRESENT: 2 %
BANDS PRESENT: 2 %
BASOPHILIA: SLIGHT
BASOPHILS # BLD: 0 %
BASOPHILS # BLD: 0 %
BASOPHILS # BLD: 0.4 %
BASOPHILS # BLD: 0.5 %
BASOPHILS # BLD: 0.6 %
BASOPHILS ABSOLUTE: 0 THOU/MM3 (ref 0–0.1)
BASOPHILS ABSOLUTE: 0 THOU/MM3 (ref 0–0.1)
BASOPHILS ABSOLUTE: 0.1 THOU/MM3 (ref 0–0.1)
BLOOD CULTURE, ROUTINE: NORMAL
BUN BLDV-MCNC: 19 MG/DL (ref 7–22)
BUN BLDV-MCNC: 20 MG/DL (ref 7–22)
BUN BLDV-MCNC: 20 MG/DL (ref 7–22)
CALCIUM SERPL-MCNC: 7.5 MG/DL (ref 8.5–10.5)
CALCIUM SERPL-MCNC: 7.6 MG/DL (ref 8.5–10.5)
CALCIUM SERPL-MCNC: 7.9 MG/DL (ref 8.5–10.5)
CALCIUM SERPL-MCNC: 7.9 MG/DL (ref 8.5–10.5)
CALCIUM SERPL-MCNC: 8 MG/DL (ref 8.5–10.5)
CALCIUM SERPL-MCNC: 8 MG/DL (ref 8.5–10.5)
CHLORIDE BLD-SCNC: 110 MEQ/L (ref 98–111)
CHLORIDE BLD-SCNC: 111 MEQ/L (ref 98–111)
CHLORIDE BLD-SCNC: 112 MEQ/L (ref 98–111)
CHLORIDE BLD-SCNC: 113 MEQ/L (ref 98–111)
CO2: 20 MEQ/L (ref 23–33)
CO2: 20 MEQ/L (ref 23–33)
CO2: 21 MEQ/L (ref 23–33)
CO2: 21 MEQ/L (ref 23–33)
CO2: 22 MEQ/L (ref 23–33)
CO2: 24 MEQ/L (ref 23–33)
CREAT SERPL-MCNC: 1.5 MG/DL (ref 0.4–1.2)
CREAT SERPL-MCNC: 1.6 MG/DL (ref 0.4–1.2)
CREAT SERPL-MCNC: 1.7 MG/DL (ref 0.4–1.2)
CRENATED RBC'S: ABNORMAL
DIFFERENTIAL TYPE: ABNORMAL
DIFFERENTIAL, MANUAL: NORMAL
DIFFERENTIAL, MANUAL: NORMAL
EOSINOPHIL # BLD: 12 %
EOSINOPHIL # BLD: 13.4 %
EOSINOPHIL # BLD: 16.5 %
EOSINOPHIL # BLD: 35 %
EOSINOPHIL # BLD: 4 %
EOSINOPHILS ABSOLUTE: 1 THOU/MM3 (ref 0–0.4)
EOSINOPHILS ABSOLUTE: 2.2 THOU/MM3 (ref 0–0.4)
EOSINOPHILS ABSOLUTE: 3 THOU/MM3 (ref 0–0.4)
EOSINOPHILS ABSOLUTE: 4 THOU/MM3 (ref 0–0.4)
EOSINOPHILS ABSOLUTE: 7.5 THOU/MM3 (ref 0–0.4)
GFR SERPL CREATININE-BSD FRML MDRD: 35 ML/MIN/1.73M2
GFR SERPL CREATININE-BSD FRML MDRD: 38 ML/MIN/1.73M2
GFR SERPL CREATININE-BSD FRML MDRD: 41 ML/MIN/1.73M2
GLUCOSE BLD-MCNC: 101 MG/DL (ref 70–108)
GLUCOSE BLD-MCNC: 112 MG/DL (ref 70–108)
GLUCOSE BLD-MCNC: 113 MG/DL (ref 70–108)
GLUCOSE BLD-MCNC: 116 MG/DL (ref 70–108)
GLUCOSE BLD-MCNC: 118 MG/DL (ref 70–108)
GLUCOSE BLD-MCNC: 127 MG/DL (ref 70–108)
GLUCOSE BLD-MCNC: 141 MG/DL (ref 70–108)
GLUCOSE BLD-MCNC: 149 MG/DL (ref 70–108)
GLUCOSE BLD-MCNC: 151 MG/DL (ref 70–108)
GLUCOSE BLD-MCNC: 51 MG/DL (ref 70–108)
GLUCOSE BLD-MCNC: 54 MG/DL (ref 70–108)
GLUCOSE BLD-MCNC: 56 MG/DL (ref 70–108)
GLUCOSE BLD-MCNC: 59 MG/DL (ref 70–108)
GLUCOSE BLD-MCNC: 62 MG/DL (ref 70–108)
GLUCOSE BLD-MCNC: 64 MG/DL (ref 70–108)
GLUCOSE BLD-MCNC: 69 MG/DL (ref 70–108)
GLUCOSE BLD-MCNC: 69 MG/DL (ref 70–108)
GLUCOSE BLD-MCNC: 75 MG/DL (ref 70–108)
GLUCOSE BLD-MCNC: 76 MG/DL (ref 70–108)
GLUCOSE BLD-MCNC: 77 MG/DL (ref 70–108)
GLUCOSE BLD-MCNC: 79 MG/DL (ref 70–108)
GLUCOSE BLD-MCNC: 81 MG/DL (ref 70–108)
GLUCOSE BLD-MCNC: 86 MG/DL (ref 70–108)
GLUCOSE BLD-MCNC: 87 MG/DL (ref 70–108)
GLUCOSE BLD-MCNC: 93 MG/DL (ref 70–108)
GLUCOSE BLD-MCNC: 95 MG/DL (ref 70–108)
GLUCOSE BLD-MCNC: 99 MG/DL (ref 70–108)
HCT VFR BLD CALC: 23.5 % (ref 37–47)
HCT VFR BLD CALC: 26.9 % (ref 37–47)
HCT VFR BLD CALC: 27.4 % (ref 37–47)
HCT VFR BLD CALC: 28 % (ref 37–47)
HCT VFR BLD CALC: 28.1 % (ref 37–47)
HEMOGLOBIN: 7.8 GM/DL (ref 12–16)
HEMOGLOBIN: 8.9 GM/DL (ref 12–16)
HEMOGLOBIN: 8.9 GM/DL (ref 12–16)
HEMOGLOBIN: 9 GM/DL (ref 12–16)
HEMOGLOBIN: 9.2 GM/DL (ref 12–16)
HYPOCHROMIA: ABNORMAL
LV EF: 58 %
LVEF MODALITY: NORMAL
LYMPHOCYTES # BLD: 12 %
LYMPHOCYTES # BLD: 19.8 %
LYMPHOCYTES # BLD: 20.1 %
LYMPHOCYTES # BLD: 26 %
LYMPHOCYTES # BLD: 8 %
LYMPHOCYTES ABSOLUTE: 1.5 THOU/MM3 (ref 1–4.8)
LYMPHOCYTES ABSOLUTE: 2.6 THOU/MM3 (ref 1–4.8)
LYMPHOCYTES ABSOLUTE: 4.4 THOU/MM3 (ref 1–4.8)
LYMPHOCYTES ABSOLUTE: 4.8 THOU/MM3 (ref 1–4.8)
LYMPHOCYTES ABSOLUTE: 6.4 THOU/MM3 (ref 1–4.8)
MAGNESIUM: 1.4 MG/DL (ref 1.6–2.4)
MAGNESIUM: 1.9 MG/DL (ref 1.6–2.4)
MCH RBC QN AUTO: 26.6 PG (ref 27–31)
MCH RBC QN AUTO: 27 PG (ref 27–31)
MCH RBC QN AUTO: 27 PG (ref 27–31)
MCH RBC QN AUTO: 27.2 PG (ref 27–31)
MCH RBC QN AUTO: 27.2 PG (ref 27–31)
MCHC RBC AUTO-ENTMCNC: 32.2 GM/DL (ref 33–37)
MCHC RBC AUTO-ENTMCNC: 32.6 GM/DL (ref 33–37)
MCHC RBC AUTO-ENTMCNC: 32.7 GM/DL (ref 33–37)
MCHC RBC AUTO-ENTMCNC: 33.1 GM/DL (ref 33–37)
MCHC RBC AUTO-ENTMCNC: 33.1 GM/DL (ref 33–37)
MCV RBC AUTO: 82.3 FL (ref 81–99)
MCV RBC AUTO: 82.3 FL (ref 81–99)
MCV RBC AUTO: 82.5 FL (ref 81–99)
MCV RBC AUTO: 82.8 FL (ref 81–99)
MCV RBC AUTO: 83 FL (ref 81–99)
MONOCYTES # BLD: 3 %
MONOCYTES # BLD: 4.4 %
MONOCYTES # BLD: 5 %
MONOCYTES # BLD: 5.1 %
MONOCYTES # BLD: 5.9 %
MONOCYTES ABSOLUTE: 0.5 THOU/MM3 (ref 0.4–1.3)
MONOCYTES ABSOLUTE: 1.1 THOU/MM3 (ref 0.4–1.3)
MONOCYTES ABSOLUTE: 1.1 THOU/MM3 (ref 0.4–1.3)
MONOCYTES ABSOLUTE: 1.3 THOU/MM3 (ref 0.4–1.3)
MONOCYTES ABSOLUTE: 1.3 THOU/MM3 (ref 0.4–1.3)
NUCLEATED RED BLOOD CELLS: 0 /100 WBC
OVALOCYTES: ABNORMAL
PATHOLOGIST REVIEW: ABNORMAL
PDW BLD-RTO: 23.4 % (ref 11.5–14.5)
PDW BLD-RTO: 23.6 % (ref 11.5–14.5)
PDW BLD-RTO: 23.7 % (ref 11.5–14.5)
PDW BLD-RTO: 24.1 % (ref 11.5–14.5)
PDW BLD-RTO: 24.2 % (ref 11.5–14.5)
PHOSPHORUS: 2.2 MG/DL (ref 2.4–4.7)
PHOSPHORUS: 2.7 MG/DL (ref 2.4–4.7)
PHOSPHORUS: 2.8 MG/DL (ref 2.4–4.7)
PHOSPHORUS: 3.1 MG/DL (ref 2.4–4.7)
PHOSPHORUS: 3.1 MG/DL (ref 2.4–4.7)
PHOSPHORUS: 3.5 MG/DL (ref 2.4–4.7)
PLATELET # BLD: 105 THOU/MM3 (ref 130–400)
PLATELET # BLD: 107 THOU/MM3 (ref 130–400)
PLATELET # BLD: 110 THOU/MM3 (ref 130–400)
PLATELET # BLD: 128 THOU/MM3 (ref 130–400)
PLATELET # BLD: 98 THOU/MM3 (ref 130–400)
PLATELET ESTIMATE: ABNORMAL
PLATELET ESTIMATE: ADEQUATE
PMV BLD AUTO: 8.9 MCM (ref 7.4–10.4)
PMV BLD AUTO: 9 MCM (ref 7.4–10.4)
PMV BLD AUTO: 9 MCM (ref 7.4–10.4)
PMV BLD AUTO: 9.1 MCM (ref 7.4–10.4)
PMV BLD AUTO: 9.6 MCM (ref 7.4–10.4)
POIKILOCYTES: ABNORMAL
POIKILOCYTES: SLIGHT
POTASSIUM SERPL-SCNC: 4.3 MEQ/L (ref 3.5–5.2)
POTASSIUM SERPL-SCNC: 4.8 MEQ/L (ref 3.5–5.2)
PROCALCITONIN: 0.67 NG/ML (ref 0.01–0.09)
RBC # BLD: 2.86 MILL/MM3 (ref 4.2–5.4)
RBC # BLD: 3.27 MILL/MM3 (ref 4.2–5.4)
RBC # BLD: 3.3 MILL/MM3 (ref 4.2–5.4)
RBC # BLD: 3.39 MILL/MM3 (ref 4.2–5.4)
RBC # BLD: 3.39 MILL/MM3 (ref 4.2–5.4)
SCAN OF BLOOD SMEAR: NORMAL
SCAN OF BLOOD SMEAR: NORMAL
SEG NEUTROPHILS: 46 %
SEG NEUTROPHILS: 58.7 %
SEG NEUTROPHILS: 60.1 %
SEG NEUTROPHILS: 64.5 %
SEG NEUTROPHILS: 75 %
SEGMENTED NEUTROPHILS ABSOLUTE COUNT: 13.3 THOU/MM3 (ref 1.8–7.7)
SEGMENTED NEUTROPHILS ABSOLUTE COUNT: 13.7 THOU/MM3 (ref 1.8–7.7)
SEGMENTED NEUTROPHILS ABSOLUTE COUNT: 14.1 THOU/MM3 (ref 1.8–7.7)
SEGMENTED NEUTROPHILS ABSOLUTE COUNT: 15.9 THOU/MM3 (ref 1.8–7.7)
SEGMENTED NEUTROPHILS ABSOLUTE COUNT: 9.8 THOU/MM3 (ref 1.8–7.7)
SODIUM BLD-SCNC: 143 MEQ/L (ref 135–145)
SODIUM BLD-SCNC: 144 MEQ/L (ref 135–145)
SODIUM BLD-SCNC: 147 MEQ/L (ref 135–145)
SODIUM BLD-SCNC: 148 MEQ/L (ref 135–145)
T4 FREE: 0.38 NG/DL (ref 0.93–1.76)
TARGET CELLS: ABNORMAL
TOTAL CK: 54 U/L (ref 30–135)
WBC # BLD: 18.2 THOU/MM3 (ref 4.8–10.8)
WBC # BLD: 21.3 THOU/MM3 (ref 4.8–10.8)
WBC # BLD: 22.1 THOU/MM3 (ref 4.8–10.8)
WBC # BLD: 24.1 THOU/MM3 (ref 4.8–10.8)
WBC # BLD: 24.7 THOU/MM3 (ref 4.8–10.8)

## 2018-01-01 PROCEDURE — 6370000000 HC RX 637 (ALT 250 FOR IP): Performed by: FAMILY MEDICINE

## 2018-01-01 PROCEDURE — 82948 REAGENT STRIP/BLOOD GLUCOSE: CPT

## 2018-01-01 PROCEDURE — 93325 DOPPLER ECHO COLOR FLOW MAPG: CPT

## 2018-01-01 PROCEDURE — 84145 PROCALCITONIN (PCT): CPT

## 2018-01-01 PROCEDURE — 2500000003 HC RX 250 WO HCPCS: Performed by: ANESTHESIOLOGY

## 2018-01-01 PROCEDURE — 6360000002 HC RX W HCPCS: Performed by: INTERNAL MEDICINE

## 2018-01-01 PROCEDURE — 3700000001 HC ADD 15 MINUTES (ANESTHESIA): Performed by: INTERNAL MEDICINE

## 2018-01-01 PROCEDURE — 80069 RENAL FUNCTION PANEL: CPT

## 2018-01-01 PROCEDURE — 6370000000 HC RX 637 (ALT 250 FOR IP): Performed by: INTERNAL MEDICINE

## 2018-01-01 PROCEDURE — 97530 THERAPEUTIC ACTIVITIES: CPT

## 2018-01-01 PROCEDURE — 1200000000 HC SEMI PRIVATE

## 2018-01-01 PROCEDURE — 99232 SBSQ HOSP IP/OBS MODERATE 35: CPT | Performed by: INTERNAL MEDICINE

## 2018-01-01 PROCEDURE — 7100000001 HC PACU RECOVERY - ADDTL 15 MIN: Performed by: INTERNAL MEDICINE

## 2018-01-01 PROCEDURE — 2580000003 HC RX 258: Performed by: FAMILY MEDICINE

## 2018-01-01 PROCEDURE — 3700000000 HC ANESTHESIA ATTENDED CARE: Performed by: INTERNAL MEDICINE

## 2018-01-01 PROCEDURE — 2580000003 HC RX 258: Performed by: NURSE PRACTITIONER

## 2018-01-01 PROCEDURE — 2580000003 HC RX 258: Performed by: INTERNAL MEDICINE

## 2018-01-01 PROCEDURE — 99238 HOSP IP/OBS DSCHRG MGMT 30/<: CPT | Performed by: INTERNAL MEDICINE

## 2018-01-01 PROCEDURE — 6370000000 HC RX 637 (ALT 250 FOR IP): Performed by: EMERGENCY MEDICINE

## 2018-01-01 PROCEDURE — 87040 BLOOD CULTURE FOR BACTERIA: CPT

## 2018-01-01 PROCEDURE — 83735 ASSAY OF MAGNESIUM: CPT

## 2018-01-01 PROCEDURE — 7100000000 HC PACU RECOVERY - FIRST 15 MIN: Performed by: INTERNAL MEDICINE

## 2018-01-01 PROCEDURE — 6360000002 HC RX W HCPCS: Performed by: ANESTHESIOLOGY

## 2018-01-01 PROCEDURE — 94640 AIRWAY INHALATION TREATMENT: CPT

## 2018-01-01 PROCEDURE — 6370000000 HC RX 637 (ALT 250 FOR IP): Performed by: HOSPITALIST

## 2018-01-01 PROCEDURE — 99233 SBSQ HOSP IP/OBS HIGH 50: CPT | Performed by: INTERNAL MEDICINE

## 2018-01-01 PROCEDURE — G8978 MOBILITY CURRENT STATUS: HCPCS

## 2018-01-01 PROCEDURE — 6360000002 HC RX W HCPCS: Performed by: HOSPITALIST

## 2018-01-01 PROCEDURE — 2580000003 HC RX 258: Performed by: ANESTHESIOLOGY

## 2018-01-01 PROCEDURE — 6370000000 HC RX 637 (ALT 250 FOR IP)

## 2018-01-01 PROCEDURE — 99231 SBSQ HOSP IP/OBS SF/LOW 25: CPT | Performed by: PHYSICIAN ASSISTANT

## 2018-01-01 PROCEDURE — 85025 COMPLETE CBC W/AUTO DIFF WBC: CPT

## 2018-01-01 PROCEDURE — G8979 MOBILITY GOAL STATUS: HCPCS

## 2018-01-01 PROCEDURE — 93312 ECHO TRANSESOPHAGEAL: CPT

## 2018-01-01 PROCEDURE — 97161 PT EVAL LOW COMPLEX 20 MIN: CPT

## 2018-01-01 PROCEDURE — B24BZZ4 ULTRASONOGRAPHY OF HEART WITH AORTA, TRANSESOPHAGEAL: ICD-10-PCS | Performed by: INTERNAL MEDICINE

## 2018-01-01 PROCEDURE — 84439 ASSAY OF FREE THYROXINE: CPT

## 2018-01-01 PROCEDURE — 97110 THERAPEUTIC EXERCISES: CPT

## 2018-01-01 PROCEDURE — 99223 1ST HOSP IP/OBS HIGH 75: CPT | Performed by: INTERNAL MEDICINE

## 2018-01-01 PROCEDURE — 99999 PR OFFICE/OUTPT VISIT,PROCEDURE ONLY: CPT | Performed by: NURSE PRACTITIONER

## 2018-01-01 PROCEDURE — 99232 SBSQ HOSP IP/OBS MODERATE 35: CPT | Performed by: HOSPITALIST

## 2018-01-01 PROCEDURE — 6360000002 HC RX W HCPCS: Performed by: FAMILY MEDICINE

## 2018-01-01 PROCEDURE — 97166 OT EVAL MOD COMPLEX 45 MIN: CPT

## 2018-01-01 PROCEDURE — 82550 ASSAY OF CK (CPK): CPT

## 2018-01-01 PROCEDURE — 93320 DOPPLER ECHO COMPLETE: CPT

## 2018-01-01 RX ORDER — FUROSEMIDE 10 MG/ML
40 INJECTION INTRAMUSCULAR; INTRAVENOUS ONCE
Status: COMPLETED | OUTPATIENT
Start: 2018-01-01 | End: 2018-01-01

## 2018-01-01 RX ORDER — PREDNISONE 1 MG/1
3 TABLET ORAL DAILY
Status: DISCONTINUED | OUTPATIENT
Start: 2018-01-01 | End: 2018-01-01

## 2018-01-01 RX ORDER — SODIUM CHLORIDE 450 MG/100ML
INJECTION, SOLUTION INTRAVENOUS CONTINUOUS
Status: DISCONTINUED | OUTPATIENT
Start: 2018-01-01 | End: 2018-01-01

## 2018-01-01 RX ORDER — LEVOTHYROXINE SODIUM 0.05 MG/1
50 TABLET ORAL DAILY
Status: DISCONTINUED | OUTPATIENT
Start: 2018-01-01 | End: 2018-01-01 | Stop reason: HOSPADM

## 2018-01-01 RX ORDER — ACETAMINOPHEN 325 MG/1
650 TABLET ORAL EVERY 4 HOURS PRN
Qty: 120 TABLET | Refills: 3 | Status: SHIPPED | OUTPATIENT
Start: 2018-01-01 | End: 2018-01-01 | Stop reason: HOSPADM

## 2018-01-01 RX ORDER — HEPARIN SODIUM 5000 [USP'U]/ML
5000 INJECTION, SOLUTION INTRAVENOUS; SUBCUTANEOUS EVERY 8 HOURS SCHEDULED
Status: DISCONTINUED | OUTPATIENT
Start: 2018-01-01 | End: 2018-01-01 | Stop reason: HOSPADM

## 2018-01-01 RX ORDER — MAGNESIUM SULFATE IN WATER 40 MG/ML
2 INJECTION, SOLUTION INTRAVENOUS ONCE
Status: COMPLETED | OUTPATIENT
Start: 2018-01-01 | End: 2018-01-01

## 2018-01-01 RX ORDER — MORPHINE SULFATE 10 MG/5ML
5 SOLUTION ORAL EVERY 4 HOURS PRN
Qty: 50 ML | Refills: 0 | Status: SHIPPED | OUTPATIENT
Start: 2018-01-01 | End: 2018-01-16

## 2018-01-01 RX ORDER — FAMOTIDINE 20 MG/1
20 TABLET, FILM COATED ORAL DAILY
Qty: 60 TABLET | Refills: 3 | Status: SHIPPED | OUTPATIENT
Start: 2018-01-01

## 2018-01-01 RX ORDER — LIDOCAINE HYDROCHLORIDE 20 MG/ML
INJECTION, SOLUTION INFILTRATION; PERINEURAL PRN
Status: DISCONTINUED | OUTPATIENT
Start: 2018-01-01 | End: 2018-01-01 | Stop reason: SDUPTHER

## 2018-01-01 RX ORDER — SODIUM CHLORIDE 450 MG/100ML
INJECTION, SOLUTION INTRAVENOUS CONTINUOUS
Status: CANCELLED | OUTPATIENT
Start: 2018-01-01

## 2018-01-01 RX ORDER — SODIUM CHLORIDE 9 MG/ML
INJECTION, SOLUTION INTRAVENOUS CONTINUOUS PRN
Status: DISCONTINUED | OUTPATIENT
Start: 2018-01-01 | End: 2018-01-01 | Stop reason: SDUPTHER

## 2018-01-01 RX ORDER — FUROSEMIDE 10 MG/ML
20 INJECTION INTRAMUSCULAR; INTRAVENOUS ONCE
Status: COMPLETED | OUTPATIENT
Start: 2018-01-01 | End: 2018-01-01

## 2018-01-01 RX ORDER — OXYCODONE HYDROCHLORIDE AND ACETAMINOPHEN 5; 325 MG/1; MG/1
1 TABLET ORAL EVERY 8 HOURS PRN
Qty: 10 TABLET | Refills: 0 | Status: SHIPPED | OUTPATIENT
Start: 2018-01-01 | End: 2018-01-01 | Stop reason: HOSPADM

## 2018-01-01 RX ORDER — LEVOTHYROXINE SODIUM 0.05 MG/1
50 TABLET ORAL DAILY
Qty: 30 TABLET | Refills: 3 | Status: SHIPPED | OUTPATIENT
Start: 2018-01-01

## 2018-01-01 RX ORDER — MULTIVITAMIN WITH FOLIC ACID 400 MCG
1 TABLET ORAL DAILY
Status: DISCONTINUED | OUTPATIENT
Start: 2018-01-01 | End: 2018-01-01 | Stop reason: HOSPADM

## 2018-01-01 RX ORDER — PREDNISONE 20 MG/1
40 TABLET ORAL 2 TIMES DAILY
Qty: 20 TABLET | Refills: 0 | Status: SHIPPED | OUTPATIENT
Start: 2018-01-01 | End: 2018-01-01

## 2018-01-01 RX ORDER — PROPOFOL 10 MG/ML
INJECTION, EMULSION INTRAVENOUS PRN
Status: DISCONTINUED | OUTPATIENT
Start: 2018-01-01 | End: 2018-01-01 | Stop reason: SDUPTHER

## 2018-01-01 RX ORDER — METHYLPREDNISOLONE SODIUM SUCCINATE 40 MG/ML
40 INJECTION, POWDER, LYOPHILIZED, FOR SOLUTION INTRAMUSCULAR; INTRAVENOUS EVERY 12 HOURS
Status: DISCONTINUED | OUTPATIENT
Start: 2018-01-01 | End: 2018-01-01

## 2018-01-01 RX ORDER — DEXTROSE AND SODIUM CHLORIDE 5; .2 G/100ML; G/100ML
INJECTION, SOLUTION INTRAVENOUS CONTINUOUS
Status: DISCONTINUED | OUTPATIENT
Start: 2018-01-01 | End: 2018-01-01 | Stop reason: HOSPADM

## 2018-01-01 RX ORDER — MIRTAZAPINE 15 MG/1
15 TABLET, FILM COATED ORAL NIGHTLY
Status: DISCONTINUED | OUTPATIENT
Start: 2018-01-01 | End: 2018-01-01 | Stop reason: HOSPADM

## 2018-01-01 RX ORDER — PREDNISONE 20 MG/1
40 TABLET ORAL 2 TIMES DAILY
Status: DISCONTINUED | OUTPATIENT
Start: 2018-01-01 | End: 2018-01-01 | Stop reason: HOSPADM

## 2018-01-01 RX ORDER — MIDODRINE HYDROCHLORIDE 2.5 MG/1
2.5 TABLET ORAL
Status: DISCONTINUED | OUTPATIENT
Start: 2018-01-01 | End: 2018-01-01

## 2018-01-01 RX ORDER — DEXTROSE AND SODIUM CHLORIDE 5; .2 G/100ML; G/100ML
INJECTION, SOLUTION INTRAVENOUS CONTINUOUS
Status: DISCONTINUED | OUTPATIENT
Start: 2018-01-01 | End: 2018-01-01

## 2018-01-01 RX ADMIN — FERROUS SULFATE TAB 325 MG (65 MG ELEMENTAL FE) 325 MG: 325 (65 FE) TAB at 08:59

## 2018-01-01 RX ADMIN — MIDODRINE HYDROCHLORIDE 5 MG: 10 TABLET ORAL at 17:54

## 2018-01-01 RX ADMIN — HEPARIN SODIUM 5000 UNITS: 5000 INJECTION, SOLUTION INTRAVENOUS; SUBCUTANEOUS at 16:39

## 2018-01-01 RX ADMIN — RIFAXIMIN 550 MG: 550 TABLET ORAL at 10:30

## 2018-01-01 RX ADMIN — MIDODRINE HYDROCHLORIDE 2.5 MG: 2.5 TABLET ORAL at 13:26

## 2018-01-01 RX ADMIN — PRAVASTATIN SODIUM 20 MG: 20 TABLET ORAL at 17:54

## 2018-01-01 RX ADMIN — Medication 2 PUFF: at 10:59

## 2018-01-01 RX ADMIN — LOPERAMIDE HYDROCHLORIDE 2 MG: 2 CAPSULE ORAL at 15:48

## 2018-01-01 RX ADMIN — Medication 1 CAPSULE: at 10:30

## 2018-01-01 RX ADMIN — Medication 1 CAPSULE: at 16:04

## 2018-01-01 RX ADMIN — RIFAXIMIN 550 MG: 550 TABLET ORAL at 09:39

## 2018-01-01 RX ADMIN — HEPARIN SODIUM 5000 UNITS: 5000 INJECTION, SOLUTION INTRAVENOUS; SUBCUTANEOUS at 21:39

## 2018-01-01 RX ADMIN — Medication 10 ML: at 20:40

## 2018-01-01 RX ADMIN — Medication 1 CAPSULE: at 08:59

## 2018-01-01 RX ADMIN — PROPOFOL 30 MG: 10 INJECTION, EMULSION INTRAVENOUS at 14:03

## 2018-01-01 RX ADMIN — Medication 1 CAPSULE: at 15:33

## 2018-01-01 RX ADMIN — LOPERAMIDE HYDROCHLORIDE 2 MG: 2 CAPSULE ORAL at 21:39

## 2018-01-01 RX ADMIN — MAGNESIUM SULFATE HEPTAHYDRATE 2 G: 40 INJECTION, SOLUTION INTRAVENOUS at 12:09

## 2018-01-01 RX ADMIN — MIDODRINE HYDROCHLORIDE 5 MG: 10 TABLET ORAL at 22:03

## 2018-01-01 RX ADMIN — FERROUS SULFATE TAB 325 MG (65 MG ELEMENTAL FE) 325 MG: 325 (65 FE) TAB at 13:13

## 2018-01-01 RX ADMIN — MIRTAZAPINE 15 MG: 15 TABLET, FILM COATED ORAL at 21:47

## 2018-01-01 RX ADMIN — DAPTOMYCIN 425 MG: 500 INJECTION, POWDER, LYOPHILIZED, FOR SOLUTION INTRAVENOUS at 14:19

## 2018-01-01 RX ADMIN — PHENYTOIN SODIUM 100 MG: 100 CAPSULE ORAL at 22:04

## 2018-01-01 RX ADMIN — PREDNISONE 30 MG: 10 TABLET ORAL at 08:59

## 2018-01-01 RX ADMIN — ONDANSETRON HYDROCHLORIDE 4 MG: 4 TABLET, FILM COATED ORAL at 22:03

## 2018-01-01 RX ADMIN — PHENYTOIN SODIUM 100 MG: 100 CAPSULE ORAL at 16:04

## 2018-01-01 RX ADMIN — LOPERAMIDE HYDROCHLORIDE 2 MG: 2 CAPSULE ORAL at 10:31

## 2018-01-01 RX ADMIN — Medication 1 CAPSULE: at 14:19

## 2018-01-01 RX ADMIN — RIFAXIMIN 550 MG: 550 TABLET ORAL at 20:39

## 2018-01-01 RX ADMIN — MIRTAZAPINE 15 MG: 15 TABLET, FILM COATED ORAL at 21:39

## 2018-01-01 RX ADMIN — DIPHENHYDRAMINE HCL 25 MG: 25 TABLET ORAL at 22:50

## 2018-01-01 RX ADMIN — Medication 1 CAPSULE: at 21:39

## 2018-01-01 RX ADMIN — PREDNISONE 30 MG: 10 TABLET ORAL at 15:33

## 2018-01-01 RX ADMIN — DAPTOMYCIN 425 MG: 500 INJECTION, POWDER, LYOPHILIZED, FOR SOLUTION INTRAVENOUS at 14:30

## 2018-01-01 RX ADMIN — PHENYTOIN SODIUM 100 MG: 100 CAPSULE ORAL at 08:59

## 2018-01-01 RX ADMIN — PHENYTOIN SODIUM 100 MG: 100 CAPSULE ORAL at 21:47

## 2018-01-01 RX ADMIN — Medication 1 CAPSULE: at 10:39

## 2018-01-01 RX ADMIN — RIFAXIMIN 550 MG: 550 TABLET ORAL at 08:32

## 2018-01-01 RX ADMIN — OXYCODONE HYDROCHLORIDE AND ACETAMINOPHEN 1 TABLET: 5; 325 TABLET ORAL at 22:14

## 2018-01-01 RX ADMIN — FOLIC ACID 1 MG: 1 TABLET ORAL at 08:59

## 2018-01-01 RX ADMIN — SODIUM CHLORIDE: 900 INJECTION INTRAVENOUS at 11:32

## 2018-01-01 RX ADMIN — Medication 1 CAPSULE: at 21:47

## 2018-01-01 RX ADMIN — RIFAXIMIN 550 MG: 550 TABLET ORAL at 10:20

## 2018-01-01 RX ADMIN — DEXTROSE MONOHYDRATE 12.5 G: 500 INJECTION PARENTERAL at 08:35

## 2018-01-01 RX ADMIN — MIDODRINE HYDROCHLORIDE 5 MG: 10 TABLET ORAL at 15:33

## 2018-01-01 RX ADMIN — Medication 5 ML: at 12:43

## 2018-01-01 RX ADMIN — FUROSEMIDE 20 MG: 10 INJECTION, SOLUTION INTRAMUSCULAR; INTRAVENOUS at 14:57

## 2018-01-01 RX ADMIN — Medication 10 ML: at 08:33

## 2018-01-01 RX ADMIN — DIPHENHYDRAMINE HCL 25 MG: 25 TABLET ORAL at 05:32

## 2018-01-01 RX ADMIN — HEPARIN SODIUM 5000 UNITS: 5000 INJECTION, SOLUTION INTRAVENOUS; SUBCUTANEOUS at 05:32

## 2018-01-01 RX ADMIN — PHENYTOIN SODIUM 100 MG: 100 CAPSULE ORAL at 15:34

## 2018-01-01 RX ADMIN — PHENYTOIN SODIUM 100 MG: 100 CAPSULE ORAL at 14:19

## 2018-01-01 RX ADMIN — MIDODRINE HYDROCHLORIDE 2.5 MG: 2.5 TABLET ORAL at 10:20

## 2018-01-01 RX ADMIN — DEXTROSE MONOHYDRATE 12.5 G: 500 INJECTION PARENTERAL at 14:00

## 2018-01-01 RX ADMIN — DIPHENHYDRAMINE HCL 25 MG: 25 TABLET ORAL at 23:30

## 2018-01-01 RX ADMIN — FAMOTIDINE 20 MG: 20 TABLET, FILM COATED ORAL at 10:39

## 2018-01-01 RX ADMIN — DIPHENHYDRAMINE HCL 25 MG: 25 TABLET ORAL at 12:07

## 2018-01-01 RX ADMIN — LIDOCAINE HYDROCHLORIDE 40 MG: 20 INJECTION, SOLUTION INFILTRATION; PERINEURAL at 13:59

## 2018-01-01 RX ADMIN — LOPERAMIDE HYDROCHLORIDE 2 MG: 2 CAPSULE ORAL at 10:39

## 2018-01-01 RX ADMIN — LOPERAMIDE HYDROCHLORIDE 2 MG: 2 CAPSULE ORAL at 21:47

## 2018-01-01 RX ADMIN — Medication 2 PUFF: at 09:36

## 2018-01-01 RX ADMIN — HEPARIN SODIUM 5000 UNITS: 5000 INJECTION, SOLUTION INTRAVENOUS; SUBCUTANEOUS at 22:03

## 2018-01-01 RX ADMIN — PHENYTOIN SODIUM 100 MG: 100 CAPSULE ORAL at 10:20

## 2018-01-01 RX ADMIN — FOLIC ACID 1 MG: 1 TABLET ORAL at 10:32

## 2018-01-01 RX ADMIN — MIDODRINE HYDROCHLORIDE 5 MG: 10 TABLET ORAL at 10:31

## 2018-01-01 RX ADMIN — RIFAXIMIN 550 MG: 550 TABLET ORAL at 21:43

## 2018-01-01 RX ADMIN — RIFAXIMIN 550 MG: 550 TABLET ORAL at 10:39

## 2018-01-01 RX ADMIN — HEPARIN SODIUM 5000 UNITS: 5000 INJECTION, SOLUTION INTRAVENOUS; SUBCUTANEOUS at 16:04

## 2018-01-01 RX ADMIN — FAMOTIDINE 20 MG: 20 TABLET, FILM COATED ORAL at 08:59

## 2018-01-01 RX ADMIN — HEPARIN SODIUM 5000 UNITS: 5000 INJECTION, SOLUTION INTRAVENOUS; SUBCUTANEOUS at 15:26

## 2018-01-01 RX ADMIN — DEXTROSE AND SODIUM CHLORIDE: 5; 200 INJECTION, SOLUTION INTRAVENOUS at 06:24

## 2018-01-01 RX ADMIN — HEPARIN SODIUM 5000 UNITS: 5000 INJECTION, SOLUTION INTRAVENOUS; SUBCUTANEOUS at 14:19

## 2018-01-01 RX ADMIN — MIDODRINE HYDROCHLORIDE 2.5 MG: 2.5 TABLET ORAL at 16:04

## 2018-01-01 RX ADMIN — OXYCODONE HYDROCHLORIDE AND ACETAMINOPHEN 1 TABLET: 5; 325 TABLET ORAL at 22:04

## 2018-01-01 RX ADMIN — FUROSEMIDE 40 MG: 10 INJECTION, SOLUTION INTRAMUSCULAR; INTRAVENOUS at 00:19

## 2018-01-01 RX ADMIN — Medication 1 CAPSULE: at 10:20

## 2018-01-01 RX ADMIN — PHENYTOIN SODIUM 100 MG: 100 CAPSULE ORAL at 10:30

## 2018-01-01 RX ADMIN — HEPARIN SODIUM 5000 UNITS: 5000 INJECTION, SOLUTION INTRAVENOUS; SUBCUTANEOUS at 06:18

## 2018-01-01 RX ADMIN — LOPERAMIDE HYDROCHLORIDE 2 MG: 2 CAPSULE ORAL at 20:40

## 2018-01-01 RX ADMIN — Medication 2 PUFF: at 08:20

## 2018-01-01 RX ADMIN — DIPHENHYDRAMINE HCL 25 MG: 25 TABLET ORAL at 04:47

## 2018-01-01 RX ADMIN — FERROUS SULFATE TAB 325 MG (65 MG ELEMENTAL FE) 325 MG: 325 (65 FE) TAB at 22:03

## 2018-01-01 RX ADMIN — Medication 10 ML: at 21:39

## 2018-01-01 RX ADMIN — PROPOFOL 20 MG: 10 INJECTION, EMULSION INTRAVENOUS at 14:06

## 2018-01-01 RX ADMIN — MIRTAZAPINE 15 MG: 15 TABLET, FILM COATED ORAL at 00:46

## 2018-01-01 RX ADMIN — MIDODRINE HYDROCHLORIDE 5 MG: 10 TABLET ORAL at 13:13

## 2018-01-01 RX ADMIN — HEPARIN SODIUM 5000 UNITS: 5000 INJECTION, SOLUTION INTRAVENOUS; SUBCUTANEOUS at 06:29

## 2018-01-01 RX ADMIN — Medication 1 CAPSULE: at 20:40

## 2018-01-01 RX ADMIN — HEPARIN SODIUM 5000 UNITS: 5000 INJECTION, SOLUTION INTRAVENOUS; SUBCUTANEOUS at 06:10

## 2018-01-01 RX ADMIN — PREDNISONE 30 MG: 10 TABLET ORAL at 10:20

## 2018-01-01 RX ADMIN — HEPARIN SODIUM 5000 UNITS: 5000 INJECTION, SOLUTION INTRAVENOUS; SUBCUTANEOUS at 16:15

## 2018-01-01 RX ADMIN — DEXTROSE MONOHYDRATE 12.5 G: 500 INJECTION PARENTERAL at 21:40

## 2018-01-01 RX ADMIN — PRAVASTATIN SODIUM 20 MG: 20 TABLET ORAL at 16:04

## 2018-01-01 RX ADMIN — POTASSIUM CHLORIDE 40 MEQ: 1500 TABLET, EXTENDED RELEASE ORAL at 10:31

## 2018-01-01 RX ADMIN — MIDODRINE HYDROCHLORIDE 5 MG: 10 TABLET ORAL at 08:59

## 2018-01-01 RX ADMIN — MEGESTROL ACETATE 40 MG: 40 SUSPENSION ORAL at 10:32

## 2018-01-01 RX ADMIN — Medication 1 CAPSULE: at 22:04

## 2018-01-01 RX ADMIN — PROPOFOL 10 MG: 10 INJECTION, EMULSION INTRAVENOUS at 13:59

## 2018-01-01 RX ADMIN — PHENYTOIN SODIUM 100 MG: 100 CAPSULE ORAL at 21:39

## 2018-01-01 RX ADMIN — POTASSIUM CHLORIDE 40 MEQ: 1500 TABLET, EXTENDED RELEASE ORAL at 08:59

## 2018-01-01 RX ADMIN — METHYLPREDNISOLONE SODIUM SUCCINATE 40 MG: 40 INJECTION, POWDER, FOR SOLUTION INTRAMUSCULAR; INTRAVENOUS at 16:17

## 2018-01-01 RX ADMIN — SODIUM CHLORIDE: 4.5 INJECTION, SOLUTION INTRAVENOUS at 16:16

## 2018-01-01 RX ADMIN — RIFAXIMIN 550 MG: 550 TABLET ORAL at 21:46

## 2018-01-01 RX ADMIN — MIDODRINE HYDROCHLORIDE 2.5 MG: 2.5 TABLET ORAL at 10:38

## 2018-01-01 RX ADMIN — PRAVASTATIN SODIUM 20 MG: 20 TABLET ORAL at 21:39

## 2018-01-01 RX ADMIN — HEPARIN SODIUM 5000 UNITS: 5000 INJECTION, SOLUTION INTRAVENOUS; SUBCUTANEOUS at 00:08

## 2018-01-01 RX ADMIN — FOLIC ACID 1 MG: 1 TABLET ORAL at 10:20

## 2018-01-01 RX ADMIN — MIRTAZAPINE 15 MG: 15 TABLET, FILM COATED ORAL at 20:39

## 2018-01-01 RX ADMIN — HEPARIN SODIUM 5000 UNITS: 5000 INJECTION, SOLUTION INTRAVENOUS; SUBCUTANEOUS at 21:47

## 2018-01-01 RX ADMIN — RIFAXIMIN 550 MG: 550 TABLET ORAL at 22:04

## 2018-01-01 RX ADMIN — FAMOTIDINE 20 MG: 20 TABLET, FILM COATED ORAL at 10:20

## 2018-01-01 RX ADMIN — Medication 2 PUFF: at 08:48

## 2018-01-01 RX ADMIN — PHENYTOIN SODIUM 100 MG: 100 CAPSULE ORAL at 20:39

## 2018-01-01 RX ADMIN — Medication 10 ML: at 10:41

## 2018-01-01 RX ADMIN — FERROUS SULFATE TAB 325 MG (65 MG ELEMENTAL FE) 325 MG: 325 (65 FE) TAB at 15:33

## 2018-01-01 RX ADMIN — FAMOTIDINE 20 MG: 20 TABLET, FILM COATED ORAL at 10:31

## 2018-01-01 RX ADMIN — SODIUM CHLORIDE: 9 INJECTION, SOLUTION INTRAVENOUS at 13:54

## 2018-01-01 RX ADMIN — SODIUM CHLORIDE: 900 INJECTION INTRAVENOUS at 15:50

## 2018-01-01 RX ADMIN — FERROUS SULFATE TAB 325 MG (65 MG ELEMENTAL FE) 325 MG: 325 (65 FE) TAB at 10:32

## 2018-01-01 RX ADMIN — Medication 2 PUFF: at 07:49

## 2018-01-01 RX ADMIN — DEXTROSE AND SODIUM CHLORIDE: 5; 200 INJECTION, SOLUTION INTRAVENOUS at 18:06

## 2018-01-01 RX ADMIN — LOPERAMIDE HYDROCHLORIDE 2 MG: 2 CAPSULE ORAL at 22:03

## 2018-01-01 RX ADMIN — DEXTROSE AND SODIUM CHLORIDE: 5; 200 INJECTION, SOLUTION INTRAVENOUS at 06:12

## 2018-01-01 RX ADMIN — FERROUS SULFATE TAB 325 MG (65 MG ELEMENTAL FE) 325 MG: 325 (65 FE) TAB at 17:54

## 2018-01-01 ASSESSMENT — PAIN SCALES - GENERAL
PAINLEVEL_OUTOF10: 7
PAINLEVEL_OUTOF10: 2
PAINLEVEL_OUTOF10: 0
PAINLEVEL_OUTOF10: 0
PAINLEVEL_OUTOF10: 1
PAINLEVEL_OUTOF10: 0
PAINLEVEL_OUTOF10: 4
PAINLEVEL_OUTOF10: 0
PAINLEVEL_OUTOF10: 4
PAINLEVEL_OUTOF10: 3
PAINLEVEL_OUTOF10: 4
PAINLEVEL_OUTOF10: 5

## 2018-01-01 ASSESSMENT — PAIN DESCRIPTION - PAIN TYPE
TYPE: ACUTE PAIN

## 2018-01-01 ASSESSMENT — PAIN DESCRIPTION - FREQUENCY
FREQUENCY: INTERMITTENT
FREQUENCY: CONTINUOUS
FREQUENCY: INTERMITTENT

## 2018-01-01 ASSESSMENT — PAIN DESCRIPTION - DESCRIPTORS
DESCRIPTORS: ACHING
DESCRIPTORS: ITCHING
DESCRIPTORS: DISCOMFORT

## 2018-01-01 ASSESSMENT — PAIN DESCRIPTION - LOCATION
LOCATION: MOUTH
LOCATION: MOUTH;GENERALIZED
LOCATION: GENERALIZED
LOCATION: MOUTH
LOCATION: MOUTH
LOCATION: GENERALIZED
LOCATION: GENERALIZED

## 2018-01-01 ASSESSMENT — PAIN DESCRIPTION - PROGRESSION
CLINICAL_PROGRESSION: GRADUALLY WORSENING
CLINICAL_PROGRESSION: RAPIDLY IMPROVING

## 2018-01-01 ASSESSMENT — PAIN DESCRIPTION - ONSET
ONSET: GRADUAL
ONSET: GRADUAL
ONSET: ON-GOING
ONSET: GRADUAL

## 2018-01-01 NOTE — PLAN OF CARE
Problem: Activity:  Goal: Ability to tolerate increased activity will improve  Ability to tolerate increased activity will improve   Outcome: Ongoing  Pt states that she does not get out of bed at the ECF as she is unable to walk.     Problem: Cardiac:  Goal: Ability to maintain an adequate cardiac output will improve  Ability to maintain an adequate cardiac output will improve   Outcome: Ongoing  Pt pulses palpable x4 ext.     Problem: Nutritional:  Goal: Maintenance of adequate nutrition will improve  Maintenance of adequate nutrition will improve   Outcome: Ongoing  Pt appetite poor, not eating last meal.     Problem: Discharge Planning:  Goal: Participates in care planning  Participates in care planning   Outcome: Ongoing  Pt to be discharged to [address] [place] [address] when medically stable.     Problem: Falls - Risk of:  Goal: Will remain free from falls  Will remain free from falls   Outcome: Ongoing  Pt has had no falls this time this shift. Bed alarm on, fall band on, fall sign posted. Hourly rounding on pt.     Problem: Infection - Central Venous Catheter-Associated Bloodstream Infection:  Goal: Will show no infection signs and symptoms  Will show no infection signs and symptoms   Outcome: Ongoing  Pt has been afebrile this shift, WBC 19.3. No redness noted at PICC site. Comments: Care plan reviewed with patient. Patient verbalized understanding of the plan of care and contribute to goal setting.

## 2018-01-01 NOTE — PROGRESS NOTES
Patient admitted to [phone] from [facility]. Arrived on hercules bed and transfer to standard bed using blue tubes. Dual lumen central line in place infusing 0.9NS at 50mL/hr. Wearing depends which is clean and dry. Moisture associated breakdown to buttocks, coccyx, and groin. Patient has red rash scattered over body with laceration attributed to scratching. Oriented to room and 5E policies. Denies needs at this time.

## 2018-01-01 NOTE — FLOWSHEET NOTE
12/31/17 2348   Provider Notification   Reason for Communication Review case  (Transfer request)   Provider Name Dr. Ron Buenrostro   Provider Notification Physician   Method of Communication Call   Response See orders   Notification Time Myrna Buenrostro regarding pt not having tele orders and need for open tele bed. Asked if transfer to med/surg would be appropriate. Order received to transfer pt to med/surg.

## 2018-01-01 NOTE — PROGRESS NOTES
Called to assess CVC site in subclavian. The skin directly surrounding the insertion is dry, clean and intact. However approximately 4cm above the insertion is appears foggy drainage, possibly resulting from a skin tear, that is encompassed under the tegaderm dressing that covers the CVC. I removed the dressing, cleansed the CVC insertion site, and replaced with a new biopatch and tegaderm stopping just below the site of the drainage. This is left open to air. Primary RN notified that CVC site is clean. I recommend she have a physician look at the drainage but inform her it is not related to the line.

## 2018-01-01 NOTE — PROGRESS NOTES
42 12/24/2017     Hepatic:   Recent Labs      12/30/17   0515  12/31/17   0450  01/01/18   0431   LABALBU  2.0*  1.9*  2.0*         Meds:  Infusion:    sodium chloride 50 mL/hr at 12/31/17 1325    dextrose       Meds:    magnesium sulfate  2 g Intravenous Once    predniSONE  30 mg Oral Daily    midodrine  5 mg Oral TID WC    loperamide  2 mg Oral TID    famotidine  20 mg Oral Daily    potassium chloride  40 mEq Oral Daily with breakfast    daptomycin (CUBICIN RF) in SWFI IV syringe  6 mg/kg Intravenous Q48H    heparin (porcine)  5,000 Units Subcutaneous 3 times per day    ergocalciferol  50,000 Units Oral Weekly    ferrous sulfate  325 mg Oral TID WC    mometasone-formoterol  2 puff Inhalation BID    folic acid  1 mg Oral Daily    lactobacillus  1 capsule Oral TID    phenytoin  100 mg Oral TID    pravastatin  20 mg Oral QPM    rifaximin  550 mg Oral BID    sodium chloride flush  10 mL Intravenous 2 times per day     Meds prn: glucose, dextrose, glucagon (rDNA), dextrose, albuterol, ondansetron, oxyCODONE-acetaminophen, sodium chloride flush, acetaminophen, diphenhydrAMINE       Impression and Plan:  1. HORTENCIA on CKD III: secondary to volume contraction from GI losses, improving. 2. Met acidosis: resolved. Off bicarbonate drip  3. Diarrhea  4. Hypomagnesemia  5. Anemia  6.  Leukoctyosis  - overall improving renal function  - bicarbonate stable and improved  - no need for bicarbonate drip  - continue with normal saline  - replace IV magnesium  - discussed with patient  Full set of labs in am      Colton Jerome MD  [facility]

## 2018-01-01 NOTE — PROGRESS NOTES
rash.  Patient will require a total of 2 weeks of daptomycin    I discussed my though processes at length with patient. Poor insight   Discussed with RN       ----------------  Subjective:     Patient seen and examined  Overnight events noted  RN and ancillary staff note reviewed    Has diffuse itchy   Not eating well  Intermittent diarrhea     Diet: DIET CARDIAC;      Medications:  Reviewed    Infusion Medications    sodium chloride 50 mL/hr at 01/01/18 1550    dextrose       Scheduled Medications    predniSONE  30 mg Oral Daily    midodrine  5 mg Oral TID WC    loperamide  2 mg Oral TID    famotidine  20 mg Oral Daily    potassium chloride  40 mEq Oral Daily with breakfast    daptomycin (CUBICIN RF) in SWFI IV syringe  6 mg/kg Intravenous Q48H    heparin (porcine)  5,000 Units Subcutaneous 3 times per day    ergocalciferol  50,000 Units Oral Weekly    ferrous sulfate  325 mg Oral TID WC    mometasone-formoterol  2 puff Inhalation BID    folic acid  1 mg Oral Daily    lactobacillus  1 capsule Oral TID    phenytoin  100 mg Oral TID    pravastatin  20 mg Oral QPM    rifaximin  550 mg Oral BID    sodium chloride flush  10 mL Intravenous 2 times per day     PRN Meds: glucose, dextrose, glucagon (rDNA), dextrose, albuterol, ondansetron, oxyCODONE-acetaminophen, sodium chloride flush, acetaminophen, diphenhydrAMINE      Intake/Output Summary (Last 24 hours) at 01/01/18 1551  Last data filed at 01/01/18 1306   Gross per 24 hour   Intake           858.21 ml   Output              500 ml   Net           358.21 ml       Diet:  DIET CARDIAC; Exam:  /68   Pulse 92   Temp 96.2 °F (35.7 °C)   Resp 16   Ht 5' 7\" (1.702 m)   Wt 156 lb 8 oz (71 kg)   SpO2 94%   BMI 24.51 kg/m²        Gen: Not in distress. Alert. Head: Normocephalic. Atraumatic. Eyes: Conjunctivae/corneas clear. ENT: Oral mucosa moist  Neck: No JVD. No obvious thyromegaly. CVS: Nml S1S2, no MRG, RRR  Pulmomary: Clear bilaterally.

## 2018-01-02 NOTE — PROGRESS NOTES
 sodium chloride flush  10 mL Intravenous 2 times per day      sodium chloride 75 mL/hr at 01/02/18 1132    dextrose       glucose, dextrose, glucagon (rDNA), dextrose, albuterol, ondansetron, oxyCODONE-acetaminophen, sodium chloride flush, acetaminophen, diphenhydrAMINE      LABS:     CBC:   Recent Labs      12/31/17   0450  01/01/18   0431  01/02/18   0520   WBC  19.3*  21.3*  18.2*   HGB  9.5*  9.2*  8.9*   PLT  113*  128*  105*     BMP:    Recent Labs      12/31/17   0450 01/01/18   0431  01/02/18   0520   NA  143  144  143   K  3.9  4.8  4.8   CL  109  110  111   CO2  24  24  21*   BUN  21  19  19   CREATININE  2.0*  1.7*  1.6*   GLUCOSE  93  76  95     Calcium:  Recent Labs      01/02/18   0520   CALCIUM  7.5*     Ionized Calcium:No results for input(s): IONCA in the last 72 hours. Magnesium:  Recent Labs      01/02/18   0520   MG  1.9     Phosphorus:  Recent Labs      01/02/18   0520   PHOS  2.7     BNP:No results for input(s): BNP in the last 72 hours. Glucose:  Recent Labs      01/01/18   1555  01/01/18   2030  01/02/18   0809   POCGLU  127*  77  64*     HgbA1C: No results for input(s): LABA1C in the last 72 hours. INR:   No results for input(s): INR in the last 72 hours. Hepatic:   Recent Labs      12/31/17   0450 01/01/18   0431  01/02/18   0520   LABALBU  1.9*  2.0*  2.0*     CULTURES:   UA: No results for input(s): SPECGRAV, PHUR, COLORU, CLARITYU, MUCUS, PROTEINU, BLOODU, RBCUA, WBCUA, BACTERIA, NITRU, GLUCOSEU, BILIRUBINUR, UROBILINOGEN, KETUA, LABCAST, LABCASTTY, AMORPHOS in the last 72 hours.     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

## 2018-01-02 NOTE — PLAN OF CARE
Problem: Nutritional:  Goal: Maintenance of adequate nutrition will improve  Maintenance of adequate nutrition will improve   Outcome: Ongoing  Pt eating 25% of meals      Problem: Discharge Planning:  Goal: Discharged to appropriate level of care  Discharged to appropriate level of care   Outcome: Met This Shift  Pt plans too return to [address] [place] [address] at dischaarge    Problem: Falls - Risk of:  Goal: Will remain free from falls  Will remain free from falls   Outcome: Met This Shift  No falls this shift  Bed/chair alarm on  Call light in reach  Pt using call light appropriately  Pt up 2 assist unsteady gait    Problem: Infection - Central Venous Catheter-Associated Bloodstream Infection:  Goal: Will show no infection signs and symptoms  Will show no infection signs and symptoms   Outcome: Met This Shift  picc in right upper chest  Dressing clean dry and intact  No s/s of infection    Problem: Sensory:  Goal: General experience of comfort will improve  General experience of comfort will improve   Outcome: Ongoing  Continuing with morley d/t low urine output  150 ml out in a 6 hour span nephrology aware  Increased iv fluids per order

## 2018-01-02 NOTE — PROGRESS NOTES
normal. Ejection fraction is estimated  at 65-70 %. Grade I diastolic dysfunction. The RA appears to be compressed from extra cardiac source. Left ventricular systolic function is normal. Ejection fraction is estimated  at 65-70 %. Grade I diastolic dysfunction. The RA appears to be compressed from extra cardiac source. Normal right ventricular size and function. Assessment    1. HORTENCIA/CKD III, 2/2 volume contraction due to high GI losses, improving renal function  - Monitor strict I&O  - Will watch UOP, appears borderline  - Daily weights. Appears to be up about 20# between 12/24-12/31.   - VSS  - Not eating or drinking well per RN, will increase to 75 mL/hr.   - BMP in am.       2. Hypomagnesemia, improving after replacement, 1.9 today. 3. Acidosis, off bicarbonate, stable but dropped to 21 today. Monitor and recheck with am labs. 4. Diarrhea, 4 stools recorded yesterday. 5. Anemia, normocytic, s/p transfusion, was as low as 6.7, now 8.9. On ferrous sulfate. 6. Sepsis/Leukocytosis/UTI, with proteus, on Daptomycin per primary service. 7. Hypotension, on midodrine, -134. Stable. 8. Chronic diastolic dysfunction grade I on last echo. Fluid status appears stable. Deadra Notice, CNP  [facility].

## 2018-01-02 NOTE — PROGRESS NOTES
jugular venous distention. Trachea midline. Respiratory:  Normal respiratory effort. Clear to auscultation, bilaterally without Rales/Wheezes/Rhonchi. Cardiovascular: Regular rate and rhythm with normal S1/S2 without murmurs, rubs or gallops. Abdomen: Soft, non-tender, non-distended with normal bowel sounds. Well healed surg scars  Musculoskeletal: No clubbing, cyanosis or edema bilaterally. Full range of motion without deformity. Skin: Skin color, texture, turgor normal.  No rashes or lesions. Neurologic:  Neurovascularly intact without any focal sensory/motor deficits. Cranial nerves: II-XII intact, grossl appropriate, normal insight    Peripheral Pulses: +2 palpable, equal bilaterally       Labs:   Recent Labs      12/31/17 0450 01/01/18   0431  01/02/18   0520   WBC  19.3*  21.3*  18.2*   HGB  9.5*  9.2*  8.9*   HCT  28.8*  28.1*  27.4*   PLT  113*  128*  105*     Recent Labs      12/31/17 0450 01/01/18   0431  01/02/18   0520   NA  143  144  143   K  3.9  4.8  4.8   CL  109  110  111   CO2  24  24  21*   BUN  21  19  19   CREATININE  2.0*  1.7*  1.6*   CALCIUM  7.5*  7.6*  7.5*   PHOS  1.7*  2.2*  2.7   HORTENCIA improving  No results for input(s): AST, ALT, BILIDIR, BILITOT, ALKPHOS in the last 72 hours. No results for input(s): INR in the last 72 hours. No results for input(s): Marito Milligan in the last 72 hours. Urinalysis:    Lab Results   Component Value Date    NITRU NEGATIVE 12/24/2017    WBCUA  12/24/2017    BACTERIA NONE 12/24/2017    RBCUA 0-2 12/24/2017    BLOODU SMALL 12/24/2017    SPECGRAV 1.013 07/21/2017    GLUCOSEU NEGATIVE 12/24/2017    GLUCOSEU NEGATIVE 03/01/2012       Radiology:  IR FLUORO GUIDED CVA DEVICE PLACEMENT   Final Result   Status post successful tunneled PICC line insertion. **This report has been created using voice recognition software. It may contain minor errors which are inherent in voice recognition technology. **      Final report electronically signed by Dr. Luke Lowe on 12/29/2017 3:23 PM      US RENAL COMPLETE   Final Result   No evidence of obstructive uropathy            **This report has been created using voice recognition software. It may contain minor errors which are inherent in voice recognition technology. **      Final report electronically signed by Dr. Morales Russ on 12/25/2017 4:42 PM      XR Chest Portable   Final Result   1. Mild platelike atelectasis and overall mild volume loss in the right lower lobe with mild elevation of the right diaphragm. **This report has been created using voice recognition software. It may contain minor errors which are inherent in voice recognition technology. **      Final report electronically signed by Dr. Carola Willingham on 12/24/2017 4:29 AM          Diet: DIET CARDIAC;    DVT prophylaxis: [x] Lovenox                                 [] SCDs                                 [] SQ Heparin                                 [] Encourage ambulation           [] Already on Anticoagulation     Disposition:    [] Home       [] TCU       [] Rehab       [] Psych       [x] SNF  ? [] [place]       [] Other-    Code Status: Full Code    PT/OT Eval Status: yes    Assessment/Plan:    Anticipated Discharge in : 2-3 days    Active Hospital Problems    Diagnosis Date Noted    Bacteremia [R78.81]     Severe sepsis ([address] [A41.9, R65.20] 12/24/2017    Urinary tract infection in female [N39.0]        1.  Weakness, needs therapy        Electronically signed by Petar Murray MD on 1/2/2018 at 5:25 PM

## 2018-01-03 NOTE — FLOWSHEET NOTE
Pt is in bed and she is concerned about getting her phone. Nurse is aware of this. Pt accepted prayer. Spiritual care card with [facility], prayer or Prayer for peace was given. It has our information of service, hours and phone number on it.       01/03/18 1057   Encounter Summary   Services provided to: Patient   Referral/Consult From: Palliative Care   Continue Visiting Yes  (1/3)   Complexity of Encounter Moderate   Length of Encounter 15 minutes   Routine   Type Follow up   Spiritual/[facility]   Type Spiritual support   Assessment Approachable   Intervention Prayer;Nurtured hope; Active listening   Outcome Coping;Encouraged; Hopeful   follow up is needed. Plan of care to return to ECF.

## 2018-01-03 NOTE — PROGRESS NOTES
[facility]    Renal inpatient Progress Note  1/3/2018 7:23 AM      Pt Name:   Alana Nur  YOB: 1940  Attending:   Sotero Saint, MD    Chief Complaint:   Alana Nur is a 78 y.o. female being followed by nephrology for CAROLINE OWASSO / CKD     Interval History : patient seen and examined by me. feels comfortable. No acute distress. Denies an CP or SOB     Scheduled Medications :   predniSONE  30 mg Oral Daily    mirtazapine  15 mg Oral Nightly    midodrine  5 mg Oral TID WC    loperamide  2 mg Oral TID    famotidine  20 mg Oral Daily    potassium chloride  40 mEq Oral Daily with breakfast    daptomycin (CUBICIN RF) in SWFI IV syringe  6 mg/kg Intravenous Q48H    heparin (porcine)  5,000 Units Subcutaneous 3 times per day    ergocalciferol  50,000 Units Oral Weekly    ferrous sulfate  325 mg Oral TID WC    mometasone-formoterol  2 puff Inhalation BID    folic acid  1 mg Oral Daily    lactobacillus  1 capsule Oral TID    phenytoin  100 mg Oral TID    pravastatin  20 mg Oral QPM    rifaximin  550 mg Oral BID    sodium chloride flush  10 mL Intravenous 2 times per day      dextrose          Vitals :  /77   Pulse 80   Temp 97.6 °F (36.4 °C) (Axillary)   Resp 16   Ht 5' 7\" (1.702 m)   Wt 154 lb 6 oz (70 kg)   SpO2 95%   BMI 24.18 kg/m²     24HR INTAKE/OUTPUT:      Intake/Output Summary (Last 24 hours) at 01/03/18 0723  Last data filed at 01/03/18 0538   Gross per 24 hour   Intake              989 ml   Output             1075 ml   Net              -86 ml     Last 3 weights  Wt Readings from Last 3 Encounters:   01/03/18 154 lb 6 oz (70 kg)   12/20/17 126 lb 3.2 oz (57.2 kg)   10/11/17 141 lb (64 kg)        Physical Exam :  General Appearance:  Well developed.  No distress  Mouth/Throat:  Oral mucosa moist  Neck:  Supple, no JVD  Lungs:  Breath sounds: clear  Heart[de-identified]  S1,S2 heard  Abdomen:  Soft, non - tender  Musculoskeletal:  Edema - minimal     Last 3

## 2018-01-03 NOTE — PROGRESS NOTES
Normal respiratory effort. Clear to auscultation, bilaterally without Rales/Wheezes/Rhonchi. Cardiovascular: Regular rate and rhythm with normal S1/S2 without murmurs, rubs or gallops. Abdomen: Soft, non-tender, non-distended with normal bowel sounds. Musculoskeletal: No clubbing, cyanosis or edema bilaterally. Full range of motion without deformity. Skin: Skin color, texture, turgor normal.  No rashes or lesions. Neurologic:  Neurovascularly intact without any focal sensory/motor deficits. Cranial nerves: II-XII intact, grossly non-focal.  Psychiatric: Alert       Labs:   Recent Labs      01/01/18   0431  01/02/18   0520   WBC  21.3*  18.2*   HGB  9.2*  8.9*   HCT  28.1*  27.4*   PLT  128*  105*     Recent Labs      01/01/18   0431  01/02/18   0520  01/03/18   0450   NA  144  143  147*   K  4.8  4.8  4.8   CL  110  111  112*   CO2  24  21*  20*   BUN  19  19  20   CREATININE  1.7*  1.6*  1.6*   CALCIUM  7.6*  7.5*  8.0*   PHOS  2.2*  2.7  2.8     No results for input(s): AST, ALT, BILIDIR, BILITOT, ALKPHOS in the last 72 hours. No results for input(s): INR in the last 72 hours. No results for input(s): Tigre Height in the last 72 hours. Urinalysis:    Lab Results   Component Value Date    NITRU NEGATIVE 12/24/2017    WBCUA  12/24/2017    BACTERIA NONE 12/24/2017    RBCUA 0-2 12/24/2017    BLOODU SMALL 12/24/2017    SPECGRAV 1.013 07/21/2017    GLUCOSEU NEGATIVE 12/24/2017    GLUCOSEU NEGATIVE 03/01/2012       Radiology:  IR FLUORO GUIDED CVA DEVICE PLACEMENT   Final Result   Status post successful tunneled PICC line insertion. **This report has been created using voice recognition software. It may contain minor errors which are inherent in voice recognition technology. **      Final report electronically signed by Dr. Nichole Ramos on 12/29/2017 3:23 PM      US RENAL COMPLETE   Final Result   No evidence of obstructive uropathy            **This report has been created using

## 2018-01-03 NOTE — PROGRESS NOTES
Training, Endurance Training, Functional Mobility Training, Equipment Evaluation, Education, & procurement, Neuromuscular Re-education, Safety Education & Training, Patient/Caregiver Education & Training, Self-Care / ADL    Goals:  Patient goals : To get stronger     Short term goals  Time Frame for Short term goals: 1 week   Short term goal 1: Pt will complete sit to stand transfers with mod assist and min vcs for hand placement to improve indep with LB clothing mgt. Short term goal 2: Pt will complete stand pivot transfers with min assist of 2 and mod tactile cues for upright posture to improve indep wtih transferring to [facility]. Short term goal 3: Pt will complete RUE WBing and strengthening activities to improve RUE strength to 4-/5 to improve indep with bathing. Short term goal 4: Pt will tolerat 1 min static standing with BUE support on walker or chair with mod vcs for upright stance to improve balance needed for dressing tasks. ,   Long term goals  Time Frame for Long term goals : No LTG due to short ELOS     Evaluation Complexity: Based on the findings of patient history, examination, clinical presentation, and decision making during this evaluation, this patient is of medium  complexity.

## 2018-01-03 NOTE — PROGRESS NOTES
Chem 59 encouraged pt to drink juice pt refused.   0825- gave pt 12.5 gram of dextrose rechecked 0845- chem 101

## 2018-01-03 NOTE — CARE COORDINATION
1/3/18, 12:28 PM      Velta Box day: 10  Location: [address] [place] [address] Reason for admit: Sepsis secondary to UTI ([facility] [address] [A41.9, N39.0]   Treatment Plan of Care: Nephrology cont to follow and ID. Continues on Daptomycin. Following labs. Creat 1.6 today. PT/OT. Core Measures: VTE  PCP: Carolina Sanchez MD  Discharge Plan: SW following for ECF placement.

## 2018-01-03 NOTE — PROGRESS NOTES
[facility]  INPATIENT PHYSICAL THERAPY  EVALUATION  STRZ SURGICAL 5E    Time In: 8071  Time Out: 1418  Timed Code Treatment Minutes: 8 Minutes  Minutes: 23          Date: 1/3/2018  Patient Name: Danae Miles,  Gender:  female        MRN: [id number]  : 1940  (78 y.o.)      Referring Practitioner: Deysi Hobbs MD  Diagnosis: Sepsis secondary to UTI  Additional Pertinent Hx: Per ED note, pt is a 78 y.o. female who presents following a fall at the nursing home. Patient states that she was lying in bed watching TV when she reached over to grab her gown, and fell off of her bed. Patient states she remembers falling, and did not lose consciousness, but did hit her head. She denies having experienced any neck pain, or any other generalized pain due to the fall. Patient had a blood pressure of 200 just prior to arrival at the ED. Past Medical History:   Diagnosis Date    Adenomatous polyp 2016    HORTENCIA (acute kidney injury) ([facility] [address] 2016    CAD (coronary artery disease)     Cancer (HCC)     DM (diabetes mellitus) ([facility] [address]     On Metformin    Hypercholesteremia     Hypertension     ON Amlodipine, Cozaar, Hydrochlorthiazide    Internal hemorrhoids     Lipoma 2016    Obesity     [facility]neumonia 2015    [facility]    Use of cane as ambulatory aid     as needed    Wears glasses      Past Surgical History:   Procedure Laterality Date    ABDOMINAL ADHESION SURGERY  10/20/2016    robotic assisted laparascopic    CHOLECYSTECTOMY      COLONOSCOPY  2016    adenomatous polyp; IN THE DISTAL ASCENDING COLON AREA WITH LITTLE LOOPING OF THE SCOPE THERE A A LARGE ( ABOUT 6-7 CM ) FLAT POLYPOID LESION NOTED OVER A FOLD IT WAS VILLOUS APPEARING AND IS NOT AMENABLE TO BE REMOVED COMPLETELY BY ENDOSCOPY ALONE.  MULTIPLE BIOPSIES WERE TAKEN AND PICTURES WERE TAKEN.: HEPATIC FLEXURE A LARGE LIPOMA WAS ALSO NOTED    HEMICOLECTOMY  10/20/2016    open    HYSTERECTOMY      OK endurance    Treatment Initiated: See exercises above     Assessment: Body structures, Functions, Activity limitations: Decreased functional mobility , Decreased endurance, Decreased balance, Decreased strength  Assessment: Pt is 78 y.o. female that is quite deconditioned and needing much assist for bed mobility and transfers. [facility] out of bed when rolling over to reach for something. Pt would benefit from continued skilled PT to address deficits with strength, bed mobiltiy, transfers, and gait. Clinical Presentation: Low - Stable and Uncomplicated:      Decision Making: Moderate Complexity based on patient assessment and decision making process of determining plan of care and establishing reasonable expectations for measurable functional outcomes    REQUIRES PT FOLLOW UP: Yes  Discharge Recommendations: Continue to assess pending progress, ECF with PT    Patient Education:  Patient Education: plan of care and LE exercises    Equipment Recommendations:  Equipment Needed: No    Safety:  Type of devices:  All fall risk precautions in place, Bed alarm in place, Call light within reach, Gait belt, Patient at risk for falls, Left in bed, Nurse notified    Plan:  Times per week: 3-5x GM  Times per day: Daily  Current Treatment Recommendations: Strengthening, Balance Training, Endurance Training, Functional Mobility Training, Transfer Training, Home Exercise Program, Safety Education & Training, Patient/Caregiver Education & Training    Goals:  Patient goals : go home (return to ECF)  Short term goals  Time Frame for Short term goals: 3 days  Short term goal 1: Pt to be CGA for supine <> sit to get in/lout of bed  Short term goal 2: Pt to be CGA for sit <> stand to get up to ambulate or transfer to another seat  Short term goal 3: LPT to assess stand-pivot or ambulation as pt able  Long term goals  Time Frame for Long term goals : not set due to short ELOS    Evaluation Complexity: Based on the

## 2018-01-04 PROBLEM — E43 SEVERE MALNUTRITION (HCC): Chronic | Status: ACTIVE | Noted: 2018-01-01

## 2018-01-04 NOTE — PLAN OF CARE
Problem: Physical Regulation:  Goal: Signs and symptoms of infection will decrease  Signs and symptoms of infection will decrease   Outcome: Met This Shift  Pt has no elevated temp    Problem: Discharge Planning:  Goal: Discharged to appropriate level of care  Discharged to appropriate level of care   Outcome: Met This Shift  Pt will be discharged to nursing home when stable    Problem: Falls - Risk of:  Goal: Will remain free from falls  Will remain free from falls   Outcome: Met This Shift  Patient free from falls this shift. Patient fall risk r/t. Continues on falling star program, siderails upx2-3, bed alarm on, call light in reach, bed in low and locked position. Hourly checks preformed, potty, position, pain, pathway and possessions assessed. Continuing to monitor. Problem: Infection - Central Venous Catheter-Associated Bloodstream Infection:  Goal: Will show no infection signs and symptoms  Will show no infection signs and symptoms   Outcome: Met This Shift  Pt has no signs of infection at PICC line site    Problem: Sensory:  Goal: General experience of comfort will improve  General experience of comfort will improve   Pt denies any pain at catheter site    Problem: Urinary Elimination:  Goal: Signs and symptoms of infection will decrease  Signs and symptoms of infection will decrease   Outcome: Met This Shift  Pt has no elevated temp    Comments: Care plan reviewed with patient. Patient does verbalize understanding of the plan of care and contribute to goal setting.

## 2018-01-04 NOTE — CARE COORDINATION
1/4/18, 12:00 PM    DISCHARGE BARRIERS      Left message for Jose Enrique Guardian at [facility] to return call . Faxed PT evaluation that was completed last evening.

## 2018-01-04 NOTE — PROGRESS NOTES
[facility]  INPATIENT PHYSICAL THERAPY  DAILYNOTE  STRZ SURGICAL 5E    Time In: 0255  Time Out: 1420  Timed Code Treatment Minutes: 38 Minutes  Minutes: 38          Date: 2018  Patient Name: Stan Dolan,  Gender:  female        MRN: [id number]  : 1940  (78 y.o.)     Referring Practitioner: Xavier Victor MD  Diagnosis: Sepsis secondary to UTI  Additional Pertinent Hx: Per ED note, pt is a 78 y.o. female who presents following a fall at the nursing home. Patient states that she was lying in bed watching TV when she reached over to grab her gown, and fell off of her bed. Patient states she remembers falling, and did not lose consciousness, but did hit her head. She denies having experienced any neck pain, or any other generalized pain due to the fall. Patient had a blood pressure of 200 just prior to arrival at the ED. Past Medical History:   Diagnosis Date    Adenomatous polyp 2016    HORTENCIA (acute kidney injury) ([facility] [address] 2016    CAD (coronary artery disease)     Cancer (HCC)     DM (diabetes mellitus) ([facility] [address]     On Metformin    Hypercholesteremia     Hypertension     ON Amlodipine, Cozaar, Hydrochlorthiazide    Internal hemorrhoids     Lipoma 2016    Obesity     [facility]onia 2015    [facility]    Use of cane as ambulatory aid     as needed    Wears glasses      Past Surgical History:   Procedure Laterality Date    ABDOMINAL ADHESION SURGERY  10/20/2016    robotic assisted laparascopic    CHOLECYSTECTOMY      COLONOSCOPY  2016    adenomatous polyp; IN THE DISTAL ASCENDING COLON AREA WITH LITTLE LOOPING OF THE SCOPE THERE A A LARGE ( ABOUT 6-7 CM ) FLAT POLYPOID LESION NOTED OVER A FOLD IT WAS VILLOUS APPEARING AND IS NOT AMENABLE TO BE REMOVED COMPLETELY BY ENDOSCOPY ALONE.  MULTIPLE BIOPSIES WERE TAKEN AND PICTURES WERE TAKEN.: HEPATIC FLEXURE A LARGE LIPOMA WAS ALSO NOTED    HEMICOLECTOMY  10/20/2016    open    HYSTERECTOMY      ND ESOPHAGOGASTRODUODENOSCOPY TRANSORAL DIAGNOSTIC N/A 7/28/2017    EGD ESOPHAGOGASTRODUODENOSCOPY performed by Trudi Ferris MD at [address] face close to ear       Restrictions/Precautions:  Restrictions/Precautions: Contact Precautions, Fall Risk, Isolation            Position Activity Restriction  Other position/activity restrictions: Hx R sided weakness         Subjective:     Subjective: RN approved therapy session. Pt. Emily Alyssa in bed and requires much encouragement to participate with therapy this date. Pt. eventually agreeable to sit EOB and perform ther ex. Pt. denies pain but reports weakness in R LE. Pain:  Denies. Social/Functional:  Type of Home: Facility  Home Equipment: 4 wheeled walker     Objective:  Bed Mobility  Rolling to Left: Contact guard assistance  Rolling to Right: Contact guard assistance  Supine to Sit: Moderate assistance (At trunk and to guide LEs toward EOB. Extra time and cues to complete)  Sit to Supine: Moderate assistance (To Elevate LEs. VCs for technique)  Scooting: Dependent/Total (Toward HOB)    Transfers  Sit to Stand: Moderate Assistance (From EOB.)  Stand to sit: Contact guard assistance (To EOB)       Balance  Comments: Pt. sat EOB approximately 20' throughout session with CGA-SBA, Pt. with slight kyphotic posture and requires cues to sit up tall. Pt. with slight posterior lean at times while performing ther ex. Exercises:  Exercises  Comments: Pt performed seated B LE ther ex 2x10 each consisting of long arc quads, marches, hip abd/add, and ankle pumps all for strengthening to improve functional mobility. Activity Tolerance:  Activity Tolerance: Patient limited by fatigue;Patient limited by endurance    Assessment: Body structures, Functions, Activity limitations: Decreased functional mobility , Decreased endurance, Decreased balance, Decreased strength  Assessment: Pt. tolerated session fair.  Pt. requires much

## 2018-01-04 NOTE — PROGRESS NOTES
to auscultation, bilaterally without Rales/Wheezes/Rhonchi. Cardiovascular: Regular rate and rhythm with normal S1/S2 without murmurs, rubs or gallops. Abdomen: Soft, non-tender, non-distended with normal bowel sounds. Musculoskeletal: No clubbing, cyanosis or edema bilaterally. Full range of motion without deformity. Skin: diffuse red rash  Neurologic:  Neurovascularly intact without any focal sensory/motor deficits. Cranial nerves: II-XII intact, grossly non-focal.  Psychiatric: Alert little speech      Labs:   Recent Labs      01/02/18   0520  01/04/18   0600   WBC  18.2*  22.1*   HGB  8.9*  8.9*   HCT  27.4*  26.9*   PLT  105*  107*     Recent Labs      01/02/18   0520  01/03/18   0450  01/04/18   0600   NA  143  147*  147*   K  4.8  4.8  4.3   CL  111  112*  111   CO2  21*  20*  21*   BUN  19  20  20   CREATININE  1.6*  1.6*  1.6*   CALCIUM  7.5*  8.0*  8.0*   PHOS  2.7  2.8  3.1     No results for input(s): AST, ALT, BILIDIR, BILITOT, ALKPHOS in the last 72 hours. No results for input(s): INR in the last 72 hours. No results for input(s): Garrick Thaddeus in the last 72 hours. Urinalysis:      Lab Results   Component Value Date    NITRU NEGATIVE 12/24/2017    WBCUA  12/24/2017    BACTERIA NONE 12/24/2017    RBCUA 0-2 12/24/2017    BLOODU SMALL 12/24/2017    SPECGRAV 1.013 07/21/2017    GLUCOSEU NEGATIVE 12/24/2017    GLUCOSEU NEGATIVE 03/01/2012       Radiology:  IR FLUORO GUIDED CVA DEVICE PLACEMENT   Final Result   Status post successful tunneled PICC line insertion. **This report has been created using voice recognition software. It may contain minor errors which are inherent in voice recognition technology. **      Final report electronically signed by Dr. Luke Lowe on 12/29/2017 3:23 PM      US RENAL COMPLETE   Final Result   No evidence of obstructive uropathy            **This report has been created using voice recognition software.   It may contain minor errors which

## 2018-01-04 NOTE — PROGRESS NOTES
[facility]  OCCUPATIONAL THERAPY MISSED TREATMENT NOTE  STRZ SURGICAL 5E  5E-54/054-A      Date: 2018  Patient Name: Lora Anguiano        CSN: [id number]   : 1940  (78 y.o.)  Gender: female   Referring Practitioner: Dr. Kavon Scott   Diagnosis: Sepsis due to UTI        REASON FOR MISSED TREATMENT:  Patient with other ancillary department. Pt with PT upon arrival. Will check back as time allows.

## 2018-01-04 NOTE — PROGRESS NOTES
Progress note: Infectious diseases    Patient - Glennis Ahumada,  Age - 78 y.o.    - 1940      Room Number - 5E-54/054-A   MRN -  [id number]   Acct # - [de-identified]  Date of Admission -  2017  3:29 AM    SUBJECTIVE:   She feels weak.has been refusing to have her mouth cleaned  No fever  High wbc likely related to the steroid and recent drug reaction (has high eosinophils)  OBJECTIVE   VITALS    height is 5' 7\" (1.702 m) and weight is 155 lb 11.2 oz (70.6 kg). Her oral temperature is 96 °F (35.6 °C). Her blood pressure is 136/74 and her pulse is 93. Her respiration is 16 and oxygen saturation is 97%. Wt Readings from Last 3 Encounters:   18 155 lb 11.2 oz (70.6 kg)   17 126 lb 3.2 oz (57.2 kg)   10/11/17 141 lb (64 kg)       I/O (24 Hours)    Intake/Output Summary (Last 24 hours) at 18 1659  Last data filed at 18 1450   Gross per 24 hour   Intake              400 ml   Output              725 ml   Net             -325 ml       General Appearance  Awake, alert, very weak.   HEENT - normocephalic, atraumatic,slightly pale  conjunctiva,  anicteric sclera  Gum recession and dental caries  Neck - Supple, no mass   Lungs -  Bilateral  air entry, +wheeze  Cardiovascular - Heart sounds are normal.    Abdomen - soft, not distended, nontender,   Neurologic -oriented  Skin -macular rash    MEDICATIONS:      heparin (porcine)  5,000 Units Subcutaneous 3 times per day    mirtazapine  15 mg Oral Nightly    loperamide  2 mg Oral TID    famotidine  20 mg Oral Daily    daptomycin (CUBICIN RF) in SWFI IV syringe  6 mg/kg Intravenous Q48H    ergocalciferol  50,000 Units Oral Weekly    mometasone-formoterol  2 puff Inhalation BID    folic acid  1 mg Oral Daily    lactobacillus  1 capsule Oral TID    phenytoin  100 mg Oral TID    pravastatin  20 mg Oral QPM    rifaximin  550 mg Oral BID    sodium

## 2018-01-04 NOTE — PLAN OF CARE
Problem: Physical Regulation:  Goal: Signs and symptoms of infection will decrease  Signs and symptoms of infection will decrease   Outcome: Ongoing  Patient emptied 100 ml of yellow malodorous this shift. Problem: Falls - Risk of:  Goal: Will remain free from falls  Will remain free from falls   Outcome: Ongoing  Patient refused to get out of bed today. Tele-sitter remains in use for patient safety. Problem: Nutrition Deficit:  Goal: Ability to achieve adequate nutritional intake will improve  Ability to achieve adequate nutritional intake will improve   Outcome: Ongoing  Patient refused to eat or drink today. IV fluids restarted. Problem: Urinary Elimination:  Goal: Signs and symptoms of infection will decrease  Signs and symptoms of infection will decrease   Outcome: Ongoing  Patient emptied 100 ml of yellow malodorous this shift. Comments: Care plan reviewed with patient . Patient did not verbalize understanding of the plan of care and contribute to goal setting.

## 2018-01-04 NOTE — PROGRESS NOTES
(Estimated Nutrition Needs):  · Estimated Daily Total Kcal: ~1830 kcals  · Estimated Daily Protein (g): 73-92 gm (as renal function allows)    Estimated Intake vs Estimated Needs: Intake Less Than Needs    Nutrition Risk Level: High    Nutrition Interventions:   Continue current diet  Continued Inpatient Monitoring, Education Initiated (1/4 Encouraged good nutrition at best efforts to promote wound healing.)    Nutrition Evaluation:   · Evaluation: Goals set   · Goals: Pt. will consume 75% or more of meals to promote wound healing during LOS. · Monitoring: Meal Intake, Wound Healing, Ascites/Edema, Weight, Pertinent Labs    See Adult Nutrition Doc Flowsheet for more detail.      Electronically signed by Jama Escobar RD, LD on 1/4/18 at 10:58 AM    Contact Number: [phone]

## 2018-01-04 NOTE — PROGRESS NOTES
Labs      01/02/18   0520  01/03/18   0450  01/04/18   0600   NA  143  147*  147*   K  4.8  4.8  4.3   CL  111  112*  111   CO2  21*  20*  21*   BUN  19  20  20   CREATININE  1.6*  1.6*  1.6*   CALCIUM  7.5*  8.0*  8.0*   LABALBU  2.0*  2.3*  2.3*        Assessment / Plan   HORTENCIA - appears to be resolving. Creatinine down to 1.6, almost baseline.  - UO is ok. Prn diuretics. - she will need diuretics but her oral intake is poor and she is comfortable, so will hold regular diuretics     Electrolytes - K better 4.8. D/C kcl for now     Hypotension - better, stop midodrine.     Chronic diarrhea - fluctuating number of stools    Mild acidosis - stable, will need oral bicarb at some point. Chronic diarrhea . Mild hypernatremia - 2/2 free water depletion. Encourage oral intake of fluids. Follow for now    Sepsis from UTI on abx. ID on board. WBC worsened and procalcitonin elevated as well.    meds reviewed and D/W patient     PABLO Hassan D.  [facility].

## 2018-01-05 NOTE — ANESTHESIA PRE PROCEDURE
Avril Hunter MD   ferrous sulfate 325 (65 Fe) MG tablet Take 1 tablet by mouth 3 times daily (with meals) 10/11/17   Dewayne Lanza MD   folic acid (FOLVITE) 1 MG tablet Take 1 tablet by mouth daily 10/11/17   Dewayne Lanza MD   Lactobacillus (PROBIOTIC ACIDOPHILUS) TABS Take 1 tablet by mouth 3 times daily 10/11/17   Dewayne Lanza MD   omeprazole (PRILOSEC) 40 MG delayed release capsule Take 1 capsule by mouth daily 10/11/17   Dewayne Lanza MD   pravastatin (PRAVACHOL) 20 MG tablet Take 1 tablet by mouth every evening 10/11/17   Dewayne Lanza MD       Current medications:    Current Facility-Administered Medications   Medication Dose Route Frequency Provider Last Rate Last Dose    dextrose 5 % and 0.2 % NaCl infusion   Intravenous Continuous Elizabet Garsia MD 83 mL/hr at 01/05/18 0612      heparin (porcine) injection 5,000 Units  5,000 Units Subcutaneous 3 times per day Elizabet Garsia MD   5,000 Units at 01/05/18 0618    Magic Mouthwash (Miracle Mouthwash) 5 mL  5 mL Swish & Spit 4x Daily PRN Elizabet Garsia MD   5 mL at 01/03/18 1243    mirtazapine (REMERON) tablet 15 mg  15 mg Oral Nightly Lakeisha Patino MD   15 mg at 01/04/18 2139    loperamide (IMODIUM) capsule 2 mg  2 mg Oral TID Lakeisha Patino MD   2 mg at 01/04/18 2139    famotidine (PEPCID) tablet 20 mg  20 mg Oral Daily Greta Mcgraw MD   20 mg at 01/04/18 1039    daptomycin (CUBICIN) 425 mg in sterile water 8.5 mL IV syringe  6 mg/kg Intravenous Q48H Ada Navarrete MD   425 mg at 01/04/18 1430    glucose (GLUTOSE) 40 % oral gel 15 g  15 g Oral PRN Mauro Dhillon MD        dextrose 50 % solution 12.5 g  12.5 g Intravenous PRN Mauro Dhillon MD   12.5 g at 01/04/18 2140    glucagon (rDNA) injection 1 mg  1 mg Intramuscular PRN Mauro Dhillon MD        dextrose 5 % solution  100 mL/hr Intravenous PRN Mauro Dhillon MD        albuterol (PROVENTIL) nebulizer solution 0.83 mg  0.83 mg Nebulization Q6H PRN Mauro Dhillon MD  ergocalciferol (ERGOCALCIFEROL) capsule 50,000 Units  50,000 Units Oral Weekly Bonnie Oliver MD   50,000 Units at 12/31/17 0903    mometasone-formoterol (DULERA) 200-5 MCG/ACT inhaler 2 puff  2 puff Inhalation BID Bonnie Oliver MD   2 puff at 42/05/34 1939    folic acid (FOLVITE) tablet 1 mg  1 mg Oral Daily Bonnie Oliver MD   1 mg at 01/03/18 1020    lactobacillus (CULTURELLE) capsule 1 capsule  1 capsule Oral TID Bonnie Oliver MD   1 capsule at 01/04/18 2139    ondansetron (ZOFRAN) tablet 4 mg  4 mg Oral Q8H PRN Bonnie Oliver MD   4 mg at 01/01/18 2203    oxyCODONE-acetaminophen (PERCOCET) 5-325 MG per tablet 1 tablet  1 tablet Oral Q8H PRN Bonnie Oliver MD   1 tablet at 01/03/18 2214    phenytoin (DILANTIN) ER capsule 100 mg  100 mg Oral TID Bonnie Oliver MD   100 mg at 01/04/18 2139    pravastatin (PRAVACHOL) tablet 20 mg  20 mg Oral QPM Bonnie Oliver MD   20 mg at 01/04/18 2139    rifaximin (XIFAXAN) tablet 550 mg  550 mg Oral BID Bonnie Oliver MD   550 mg at 01/04/18 2143    sodium chloride flush 0.9 % injection 10 mL  10 mL Intravenous 2 times per day Bonnie Oliver MD   10 mL at 01/04/18 2139    sodium chloride flush 0.9 % injection 10 mL  10 mL Intravenous PRN Bonnie Oliver MD   10 mL at 12/25/17 1432    acetaminophen (TYLENOL) tablet 650 mg  650 mg Oral Q4H PRN Bonnie Oliver MD        diphenhydrAMINE (BENADRYL) tablet 25 mg  25 mg Oral Q6H PRN Grace Duenas DO   25 mg at 01/03/18 2250       Allergies:  No Known Allergies    Problem List:    Patient Active Problem List   Diagnosis Code    Hypertension I10    Lymphedema I89.0    Adenomatous polyp D36.9    Internal hemorrhoids K64.8    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

## 2018-01-05 NOTE — FLOWSHEET NOTE
01/04/18 2238   Provider Notification   Reason for Communication Review case   Provider Name Светлана Rand   Provider Notification Physician   Method of Communication Secure Message   Response See orders     Perfect Serve Message sent regarding morley output 125mL since 1500, 100mL output on shift prior. 0.45NS @ 75 infusing already. Order received for lasix 40mg IV x1 dose. Continue to monitor urine output.

## 2018-01-05 NOTE — CONSULTS
use.    HOME MEDICATIONS:  Prior to admission included the following:  Albuterol,  aspirin, colchicine, ergocalciferol, ferrous sulfate, Breo, folic acid,  loperamide, Megace, Prilosec, Zofran, Percocet, Dilantin, potassium  chloride, pravastatin, rifaximin, and sodium bicarbonate. Currently, the patient is on daptomycin and rifaximin. ALLERGIES:  No known drug allergies. REVIEW OF SYSTEMS:  Negative except the above-mentioned ones. PHYSICAL EXAMINATION:  GENERAL:  Looks sick, frail, basically in mild respiratory distress. VITAL SIGNS:  Blood pressure 136/74, pulse rate 93, respiratory rate 16,  and temperature 98. 6. HEENT:  Slightly pale conjunctivae. Anicteric sclerae. Pupils equal and  reactive to light. NECK:  No lymphadenopathy. No goiter. No JVD. CHEST:  Decreased air entry bilaterally. Otherwise, clear to auscultation. CARDIOVASCULAR:   Arteries felt; carotid, femoral, and pedal.  S1 and S2  well heard. No murmur or gallop rhythm. ABDOMEN:  Soft, nontender, and nondistended. Bowel sounds are positive. GENITOURINARY:  No CVA, flank, or suprapubic tenderness. LOCOMOTOR:  No cyanosis, clubbing, muscle or joint tenderness. CNS:  Alert, awake, oriented to time, person, and place. Cranial nerves  are intact. Sensation is intact to light touch and pain. Motor, normal  muscle strength and tone. Depressed mood and affect, but very weak and  frail woman. LABORATORY DATA:  White blood cell count 32,000, hemoglobin 8.6, and  platelets 369,816. Blood chemistry, sodium 147, potassium 4.3, BUN 20, and  creatinine 1.6. Cardiac enzymes on admission 0.07, 0.06, 0.05. DIAGNOSTIC DATA:  Echocardiogram last year this time, ejection fraction of  65% to 70%. EKG on this admission revealed sinus rhythm, low voltage,  nonspecific ST-T segment changes. No acute EKG abnormality obviously. Previous EKG, actually the current EKG is better than 12/16.   She has  marked T-wave inversion on the anterior leads, basically suggestive for  possible ischemia. I do not see any functional study done in this patient. ASSESSMENT:  1.  Sepsis due to VRE noted on the blood culture done on 12/24/2017.  2.  Urinary tract infection. 3.  Chronic kidney disease, apparently stage III. 4.  Abnormal EKG with nonspecific T-wave ST-segment changes currently, but  the previous EKG from 12/16/2017 with marked T-wave inversion on the  anterior leads suggestive for ischemia. 5.  Elevated troponin, mildly elevated, could be secondary to sepsis. PLAN AND RECOMMENDATIONS:  At this time, we will schedule her for FLORES in  the presence of anesthesia, she is agreeable with that. Risks and benefits  explained. Risk of anesthesia, risk of FLORES, possible esophageal damage has  been explained. The patient verbalized understanding and agreed with the  plan of care. So, we will schedule that. Continue the current medical  therapy and basically, the patient may need functional study down the line  once she is stabilized because of the abnormal EKG and abnormal mildly  elevated troponin. That we will discuss with the patient while she gets  stable to decide on those functional studies to be done before discharge. Thank you for allowing us to participate in the care of this patient. I  will follow with you. Delfin Mendez.  Be Leong M.D.    D: 01/04/2018 20:42:54       T: 01/04/2018 23:03:13     [email or web address]  Job#: [id number]     Doc#: [id number]    CC:

## 2018-01-05 NOTE — PROGRESS NOTES
Chronically ill appearing    HEENT: Pupils equal, round, and reactive to light. Conjunctivae/corneas clear. Neck: Supple, with full range of motion. No jugular venous distention. Trachea midline. Respiratory:  Normal respiratory effort. Clear to auscultation, bilaterally without Rales/Wheezes/Rhonchi. Cardiovascular: Regular rate and rhythm with normal S1/S2 without murmurs, rubs or gallops. Abdomen: Soft, non-tender, non-distended with normal bowel sounds. Musculoskeletal: no tenderness hip to palp. Skin:  Generalized flat red rash  Neurologic:  Neurovascularly intact without any focal sensory/motor deficits. Cranial nerves: II-XII intact, grossly non-focal.  Psychiatric: Alert, difficult to understand; not complete sentences        Labs:   Recent Labs      01/02/18   0520  01/04/18   0600   WBC  18.2*  22.1*   HGB  8.9*  8.9*   HCT  27.4*  26.9*   PLT  105*  107*     Recent Labs      01/03/18   0450  01/04/18   0600  01/05/18   0320   NA  147*  147*  148*   K  4.8  4.3  4.3   CL  112*  111  113*   CO2  20*  21*  20*   BUN  20  20  19   CREATININE  1.6*  1.6*  1.5*   CALCIUM  8.0*  8.0*  7.9*   PHOS  2.8  3.1  3.1     No results for input(s): AST, ALT, BILIDIR, BILITOT, ALKPHOS in the last 72 hours. No results for input(s): INR in the last 72 hours. No results for input(s): [place] in the last 72 hours. Urinalysis:    Lab Results   Component Value Date    NITRU NEGATIVE 12/24/2017    WBCUA  12/24/2017    BACTERIA NONE 12/24/2017    RBCUA 0-2 12/24/2017    BLOODU SMALL 12/24/2017    SPECGRAV 1.013 07/21/2017    GLUCOSEU NEGATIVE 12/24/2017    GLUCOSEU NEGATIVE 03/01/2012       Radiology:  IR FLUORO GUIDED CVA DEVICE PLACEMENT   Final Result   Status post successful tunneled PICC line insertion. **This report has been created using voice recognition software. It may contain minor errors which are inherent in voice recognition technology. **      Final report electronically signed by Dr. Thom Mahoney on 12/29/2017 3:23 PM      US RENAL COMPLETE   Final Result   No evidence of obstructive uropathy            **This report has been created using voice recognition software. It may contain minor errors which are inherent in voice recognition technology. **      Final report electronically signed by Dr. Figueroa France on 12/25/2017 4:42 PM      XR Chest Portable   Final Result   1. Mild platelike atelectasis and overall mild volume loss in the right lower lobe with mild elevation of the right diaphragm. **This report has been created using voice recognition software. It may contain minor errors which are inherent in voice recognition technology. **      Final report electronically signed by Dr. Adal Love on 12/24/2017 4:29 AM          Diet: Diet NPO, After Midnight Exceptions are: Other (See Comment)    DVT prophylaxis: [] Lovenox                                 [x] SCDs                                 [] SQ Heparin                                 [] Encourage ambulation           [] Already on Anticoagulation     Disposition:    [] Home       [] TCU       [] Rehab       [] Psych       [x] SNF       [] [place]       [] Other-    Code Status: Full Code    PT/OT Eval Status:  active and ongoing      Assessment/Plan:    Anticipated Discharge in : 24 hr    Active Hospital Problems    Diagnosis Date Noted    Severe malnutrition ([facility] [address] [E43] 01/04/2018    Other streptococcal sepsis ([facility] [address] [A40.8]     Troponin level elevated [R74.8]     Abnormal EKG [R94.31]     Elevated troponin [R74.8]     Hypoglycemia [E16.2]     Bacteremia [R78.81]     Sepsis ([facility] [address] [A41.9] 12/24/2017    Urinary tract infection in female [N39.0]        1. Enterococcal sepsis- for FLORES today  2. almaz- improved but sodium increasing- will change to . 2ns        Electronically signed by Bari Collier MD on 1/5/2018 at 5:00 AM

## 2018-01-05 NOTE — PLAN OF CARE
place. Decrease output of 125mL at [facility] Dr. Daniele Ervin. Order for lasix 40mg. Comments: Care plan reviewed with patient. Patient verbalizes understanding of the plan of care and contribute to goal setting.

## 2018-01-06 PROBLEM — E03.9 ACQUIRED HYPOTHYROIDISM: Status: ACTIVE | Noted: 2018-01-01

## 2018-01-06 PROBLEM — M54.9 CHRONIC BACK PAIN: Status: ACTIVE | Noted: 2018-01-01

## 2018-01-06 PROBLEM — M54.50 CHRONIC MIDLINE LOW BACK PAIN WITHOUT SCIATICA: Status: ACTIVE | Noted: 2018-01-01

## 2018-01-06 PROBLEM — G89.29 CHRONIC BACK PAIN: Status: ACTIVE | Noted: 2018-01-01

## 2018-01-06 NOTE — PROGRESS NOTES
Acct: [de-identified]  Admit Date:  12/24/2017  Primary Cardiologist: FLORES done by Dr Guille Herrera    Chief Complaint/Reason for Consultation: S/P FLORES    Subjective (Events in last 24 hours):   Pt confused today. Denies any swallowing issues.         Objective:   BP (!) 142/66   Pulse 91   Temp 96 °F (35.6 °C) (Axillary)   Resp 20   Ht 5' 7\" (1.702 m)   Wt 151 lb 6.4 oz (68.7 kg)   SpO2 98%   BMI 23.71 kg/m²        TELEMETRY: NSR    Physical Exam:  General Appearance: somnolent  Cardiovascular: normal rate, regular rhythm, normal S1 and S2, no murmurs, rubs, clicks, or gallops, distal pulses intact, no carotid bruits, no JVD  Pulmonary/Chest: clear to auscultation bilaterally- no wheezes, rales or rhonchi, normal air movement, no respiratory distress  Abdomen: soft, non-tender, non-distended, normal bowel sounds, no masses Extremities: no cyanosis, clubbing or edema, pulse   Skin: warm and dry, no rash or erythema  Head: normocephalic and atraumatic  Eyes: pupils equal, round, and reactive to light  Neck: supple and non-tender without mass, no thyromegaly   Musculoskeletal: normal range of motion, no joint swelling, deformity or tenderness  Neurological: somnolent awakens and moves all ext  Medications:    multivitamin  1 tablet Oral Daily    predniSONE  40 mg Oral BID    levothyroxine  50 mcg Oral Daily    heparin (porcine)  5,000 Units Subcutaneous 3 times per day    mirtazapine  15 mg Oral Nightly    loperamide  2 mg Oral TID    famotidine  20 mg Oral Daily    daptomycin (CUBICIN RF) in SWFI IV syringe  6 mg/kg Intravenous Q48H    ergocalciferol  50,000 Units Oral Weekly    mometasone-formoterol  2 puff Inhalation BID    folic acid  1 mg Oral Daily    lactobacillus  1 capsule Oral TID    phenytoin  100 mg Oral TID    pravastatin  20 mg Oral QPM    rifaximin  550 mg Oral BID    sodium chloride flush  10 mL Intravenous 2 times per day      dextrose 5 % and 0.2 % NaCl      dextrose         Magic

## 2018-01-06 NOTE — DISCHARGE SUMMARY
had a marked leukocytosis during the latter part of her stay with marked eosinophilia suggestive of an allergic reaction. She underwent FLORES during the latter part of her stay to evaluate for SBE; this was negative. She was unwilling to eat or drink during the latter part of her stay and this was discussed. with her family member who felt it appropriate to just treat her with comfort measures. Accordingly she was discharged to the nursing home. .              Exam:     Vitals:  Vitals:    01/06/18 0415 01/06/18 0615 01/06/18 0848 01/06/18 1100   BP: (!) 118/54  128/60 (!) 142/66   Pulse: 91  89 91   Resp: 18  18 20   Temp: 97.4 °F (36.3 °C)  96 °F (35.6 °C) 96 °F (35.6 °C)   TempSrc: Rectal  Rectal Axillary   SpO2: 96%  95% 98%   Weight:  151 lb 6.4 oz (68.7 kg)     Height:         Weight: Weight: 151 lb 6.4 oz (68.7 kg)     24 hour intake/output:  Intake/Output Summary (Last 24 hours) at 01/06/18 1615  Last data filed at 01/06/18 0455   Gross per 24 hour   Intake           1125.4 ml   Output              350 ml   Net            775.4 ml         General appearance:  No apparent distress, appears stated age and cooperative. HEENT:  Normal cephalic, atraumatic without obvious deformity. Pupils equal, round, and reactive to light. Extra ocular muscles intact. Conjunctivae/corneas clear. Neck: Supple, with full range of motion. No jugular venous distention. Trachea midline. Respiratory:  Normal respiratory effort. Clear to auscultation, bilaterally without Rales/Wheezes/Rhonchi. Cardiovascular:  Regular rate and rhythm with normal S1/S2 without murmurs, rubs or gallops. Abdomen: Soft, non-tender, non-distended with normal bowel sounds. Musculoskeletal:  No clubbing, cyanosis or edema bilaterally. Full range of motion without deformity. Skin: Skin color, texture, turgor normal.  No rashes or lesions. Neurologic:  Neurovascularly intact without any focal sensory/motor deficits.  Cranial nerves: II-XII and review of medications and discharge plan. Signed: Thank you Tata Brown MD for the opportunity to be involved in this patient's care.     Electronically signed by Dorothy Shepherd MD on 1/6/2018 at 4:15 PM

## 2018-01-06 NOTE — PLAN OF CARE
Problem: Cardiac:  Goal: Ability to maintain an adequate cardiac output will improve  Ability to maintain an adequate cardiac output will improve   Outcome: Ongoing  BP low 100/50s but stable. Did not receive midodrine today as patient refused PO meds. Low temp of 92.0 rectal after returning from FLORES, bear hugger applied and close monitoring of temperature. Back to normal.     Problem: Nutritional:  Goal: Maintenance of adequate nutrition will improve  Maintenance of adequate nutrition will improve   Outcome: Ongoing  Patient NPO before and after FLORES. Mouth dry. Receiving IV fluids D5/0.2%NS at 83mL/hr. Chem 112 at HS.     Problem: Discharge Planning:  Goal: Discharged to appropriate level of care  Discharged to appropriate level of care   Outcome: Ongoing  Patient admitted from [facility]. Precert in works to return there.     Problem: Falls - Risk of:  Goal: Will remain free from falls  Will remain free from falls   Outcome: Ongoing  Telesitter in use. Patient remains in bed. Rolling side to side on her own. Patient agitated often and uncooperative. Refused nighttime meds. Bed alarm on zone 2.     Problem: Infection - Central Venous Catheter-Associated Bloodstream Infection:  Goal: Will show no infection signs and symptoms  Will show no infection signs and symptoms   Outcome: Ongoing  Central Line flushes and draws with ease. Dressing in place. No redness or irritation.     Problem: Mobility - Impaired:  Goal: Mobility will improve  Mobility will improve   Outcome: Ongoing  Patient remains in bed this shift. Moves, rolls around and turns side to side without assistance. Removed extra pillows from under patient's side as she is trying to roll off of them and rolling further into side rail.   Working with PT/OT.     Problem: Urinary Elimination:  Goal: Ability to reestablish a normal urinary elimination pattern will improve - after catheter removal  Ability to reestablish a normal urinary elimination

## 2018-01-06 NOTE — PROGRESS NOTES
Pt confused to time and place. States not sick. Refuses to take pills orally. They just make her sick. Refused most of assessment. Just wants to turn over and sleep.

## 2018-01-08 NOTE — CARE COORDINATION
1/8/18, 7:43 AM  Late Entry  Patient discharged 1/6/2018 to [facility] via [facility] ambulance. Loli Roblero at [facility] notified on 1/5 that patient could be discharged over the weekend. Blue envelope and [facility] paperwork prepared and left on patient chart with instruction for RN to notify [facility] of discharge, call report, fax AVS and prescritpions and fax paperwork to [facility] for transportation. Discharge plan discussed by  and . Discharge plan reviewed with patient/ family. Patient/ family verbalize understanding of discharge plan and are in agreement with plan. Understanding was demonstrated using the teach back method.        IMM Letter  IMM Letter given to Patient/Family/Significant other/Guardian/POA/by[de-identified]   IMM Letter date given[de-identified] 01/05/18  IMM Letter time given[de-identified] [id number]

## 2019-08-19 NOTE — TELEPHONE ENCOUNTER
Hanh Kurtz from [facility] called to see if you would follow patient for home care. no fever and no chills.

## 2024-06-06 NOTE — PROGRESS NOTES
daily      loperamide (LOPERAMIDE A-D) 2 MG tablet Take 2 mg by mouth every 2 hours as needed for Diarrhea      albuterol (PROVENTIL) (2.5 MG/3ML) 0.083% nebulizer solution Take 0.83 mg by nebulization every 6 hours as needed for Wheezing       colchicine (COLCRYS) 0.6 MG tablet Take 0.6 mg by mouth daily      aspirin 81 MG tablet Take 1 tablet by mouth daily 30 tablet 3    ergocalciferol (DRISDOL) 83667 units capsule Take 1 capsule by mouth once a week 12 capsule 0    ferrous sulfate 325 (65 Fe) MG tablet Take 1 tablet by mouth 3 times daily (with meals) 90 tablet 3    folic acid (FOLVITE) 1 MG tablet Take 1 tablet by mouth daily 30 tablet 5    Lactobacillus (PROBIOTIC ACIDOPHILUS) TABS Take 1 tablet by mouth 3 times daily 90 tablet 3    omeprazole (PRILOSEC) 40 MG delayed release capsule Take 1 capsule by mouth daily 30 capsule 5    pravastatin (PRAVACHOL) 20 MG tablet Take 1 tablet by mouth every evening 30 tablet 5       Current Facility-Administered Medications   Medication Dose Route Frequency Provider Last Rate Last Dose    dextrose 5 % and 0.2 % NaCl infusion   Intravenous Continuous Paradise Morales MD 83 mL/hr at 01/05/18 0612      heparin (porcine) injection 5,000 Units  5,000 Units Subcutaneous 3 times per day Paradise Morales MD   5,000 Units at 01/05/18 0618    Magic Mouthwash (Miracle Mouthwash) 5 mL  5 mL Swish & Spit 4x Daily PRN Paradise Morales MD   5 mL at 01/03/18 1243    mirtazapine (REMERON) tablet 15 mg  15 mg Oral Nightly Moses Moralez MD   15 mg at 01/04/18 2139    loperamide (IMODIUM) capsule 2 mg  2 mg Oral TID Moses Moralez MD   2 mg at 01/04/18 2139    famotidine (PEPCID) tablet 20 mg  20 mg Oral Daily Kailash Oliveira MD   20 mg at 01/04/18 1039    daptomycin (CUBICIN) 425 mg in sterile water 8.5 mL IV syringe  6 mg/kg Intravenous Q48H Tricia Reina MD   425 mg at 01/04/18 1430    glucose (GLUTOSE) 40 % oral gel 15 g  15 g Oral PRN MD Jose Armando Hsieh []4      [x]Pre-procedure diagnostic studies complete and results available. Comment:    [x]Previous sedation/anesthesia experiences assessed. Comment:    [x]The patient is an appropriate candidate to undergo the planned procedure sedation and anesthesia. (Refer to nursing sedation/analgesia documentation record)  [x]Formulation and discussion of sedation/procedure plan, risks, and expectations with patient and/or responsible adult completed. [x]Patient examined immediately prior to the procedure.  (Refer to nursing sedation/analgesia documentation record)        Leobardo Langston MD  Electronically signed 1/5/2018 at 1:55 PM details… soft/nontender

## (undated) DEVICE — TUBING IV STOPCOCK 48 CM 3 W

## (undated) DEVICE — Device

## (undated) DEVICE — YANKAUER,BULB TIP,W/O VENT,RIGID,STERILE: Brand: MEDLINE

## (undated) DEVICE — CANISTER, RIGID, 2000CC: Brand: MEDLINE INDUSTRIES, INC.

## (undated) DEVICE — TUBING, SUCTION, 1/4" X 20', STRAIGHT: Brand: MEDLINE INDUSTRIES, INC.